# Patient Record
Sex: FEMALE | Employment: FULL TIME | URBAN - METROPOLITAN AREA
[De-identification: names, ages, dates, MRNs, and addresses within clinical notes are randomized per-mention and may not be internally consistent; named-entity substitution may affect disease eponyms.]

---

## 2019-06-12 ENCOUNTER — TELEPHONE (OUTPATIENT)
Dept: ENDOSCOPY | Facility: HOSPITAL | Age: 74
End: 2019-06-12

## 2019-06-12 DIAGNOSIS — R17 JAUNDICE: Primary | ICD-10-CM

## 2019-06-12 NOTE — TELEPHONE ENCOUNTER
Spoke with patient regarding EUS/ERCP for 6/13/19 at 1100. Medical history/medications and prep instructions reviewed. Patient verbalized an understanding.

## 2019-06-12 NOTE — TELEPHONE ENCOUNTER
Spoke with patient. EUS/ERCP scheduled for 6/13 at 11:00a. Reviewed prep instructions. Ms Hawkins verbalized understanding.

## 2019-06-12 NOTE — TELEPHONE ENCOUNTER
----- Message from Renita Carlson sent at 6/12/2019  9:51 AM CDT -----  Regarding: Ext Gastro Referral  Good morning,    Current pt is being referred to Dr Abdul from Dr Tam Mei for a pancreatic mass and jaundice. The referring clinic stated that Dr Mei has spoken to Dr Abdul about this pt already and he agreed to see pt this week. I have scanned the referral and records in to media mgr. Please contact pt to schedule and let me know if I can help any further. Also, the referring clinic's number is [phone] in case you needed to reach out.    Thank you,  Renita Carlson  [facility]   Ext [phone]

## 2019-06-13 ENCOUNTER — ANESTHESIA EVENT (OUTPATIENT)
Dept: ENDOSCOPY | Facility: HOSPITAL | Age: 74
End: 2019-06-13
Payer: COMMERCIAL

## 2019-06-13 ENCOUNTER — HOSPITAL ENCOUNTER (OUTPATIENT)
Facility: HOSPITAL | Age: 74
Discharge: HOME | End: 2019-06-13
Attending: INTERNAL MEDICINE | Admitting: INTERNAL MEDICINE
Payer: COMMERCIAL

## 2019-06-13 ENCOUNTER — ANESTHESIA (OUTPATIENT)
Dept: ENDOSCOPY | Facility: HOSPITAL | Age: 74
End: 2019-06-13
Payer: COMMERCIAL

## 2019-06-13 VITALS
OXYGEN SATURATION: 100 % | BODY MASS INDEX: 22.63 KG/M2 | DIASTOLIC BLOOD PRESSURE: 76 MMHG | WEIGHT: 123 LBS | SYSTOLIC BLOOD PRESSURE: 154 MMHG | HEART RATE: 76 BPM | TEMPERATURE: 98 F | HEIGHT: 62 IN | RESPIRATION RATE: 16 BRPM

## 2019-06-13 DIAGNOSIS — K83.1 OBSTRUCTIVE JAUNDICE: Primary | ICD-10-CM

## 2019-06-13 PROCEDURE — 27202059 HC NEEDLE, FNA (ANY): Performed by: INTERNAL MEDICINE

## 2019-06-13 PROCEDURE — 88172 CYTP DX EVAL FNA 1ST EA SITE: CPT | Mod: 26,,, | Performed by: PATHOLOGY

## 2019-06-13 PROCEDURE — C1769 GUIDE WIRE: HCPCS | Performed by: INTERNAL MEDICINE

## 2019-06-13 PROCEDURE — D9220A PRA ANESTHESIA: Mod: CRNA,,, | Performed by: NURSE ANESTHETIST, CERTIFIED REGISTERED

## 2019-06-13 PROCEDURE — D9220A PRA ANESTHESIA: ICD-10-PCS | Mod: CRNA,,, | Performed by: NURSE ANESTHETIST, CERTIFIED REGISTERED

## 2019-06-13 PROCEDURE — 63600175 PHARM REV CODE 636 W HCPCS: Performed by: NURSE ANESTHETIST, CERTIFIED REGISTERED

## 2019-06-13 PROCEDURE — 37000008 HC ANESTHESIA 1ST 15 MINUTES: Performed by: INTERNAL MEDICINE

## 2019-06-13 PROCEDURE — 25000003 PHARM REV CODE 250: Performed by: NURSE ANESTHETIST, CERTIFIED REGISTERED

## 2019-06-13 PROCEDURE — D9220A PRA ANESTHESIA: ICD-10-PCS | Mod: ANES,,, | Performed by: ANESTHESIOLOGY

## 2019-06-13 PROCEDURE — D9220A PRA ANESTHESIA: Mod: ANES,,, | Performed by: ANESTHESIOLOGY

## 2019-06-13 PROCEDURE — 88305 CYTOLOGY SPECIMEN- FNA RADIOLOGY GUIDED, BRONCH/EBUS, EUS/GI: ICD-10-PCS | Mod: 26,,, | Performed by: PATHOLOGY

## 2019-06-13 PROCEDURE — 94761 N-INVAS EAR/PLS OXIMETRY MLT: CPT

## 2019-06-13 PROCEDURE — 88305 TISSUE EXAM BY PATHOLOGIST: CPT | Mod: 26,,, | Performed by: PATHOLOGY

## 2019-06-13 PROCEDURE — 27202304 HC CANNULA, ERCP: Performed by: INTERNAL MEDICINE

## 2019-06-13 PROCEDURE — 37000009 HC ANESTHESIA EA ADD 15 MINS: Performed by: INTERNAL MEDICINE

## 2019-06-13 PROCEDURE — 88172 CYTOLOGY SPECIMEN- FNA RADIOLOGY GUIDED, BRONCH/EBUS, EUS/GI: ICD-10-PCS | Mod: 26,,, | Performed by: PATHOLOGY

## 2019-06-13 PROCEDURE — 88305 TISSUE EXAM BY PATHOLOGIST: CPT | Performed by: PATHOLOGY

## 2019-06-13 PROCEDURE — 27201674 HC SPHINCTERTOME: Performed by: INTERNAL MEDICINE

## 2019-06-13 PROCEDURE — 43242 PR UPGI ENDOSCOPY,FN NEEDLE BX,GUIDED: ICD-10-PCS | Mod: 22,,, | Performed by: INTERNAL MEDICINE

## 2019-06-13 PROCEDURE — 43242 EGD US FINE NEEDLE BX/ASPIR: CPT | Mod: 22,,, | Performed by: INTERNAL MEDICINE

## 2019-06-13 PROCEDURE — 43242 EGD US FINE NEEDLE BX/ASPIR: CPT | Performed by: INTERNAL MEDICINE

## 2019-06-13 PROCEDURE — 25000003 PHARM REV CODE 250: Performed by: INTERNAL MEDICINE

## 2019-06-13 PROCEDURE — 25500020 PHARM REV CODE 255: Performed by: INTERNAL MEDICINE

## 2019-06-13 PROCEDURE — 88173 CYTOLOGY SPECIMEN- FNA RADIOLOGY GUIDED, BRONCH/EBUS, EUS/GI: ICD-10-PCS | Mod: 26,,, | Performed by: PATHOLOGY

## 2019-06-13 PROCEDURE — 88173 CYTOPATH EVAL FNA REPORT: CPT | Mod: 26,,, | Performed by: PATHOLOGY

## 2019-06-13 RX ORDER — TIZANIDINE 4 MG/1
4 TABLET ORAL EVERY 6 HOURS PRN
Status: ON HOLD | COMMUNITY
End: 2020-03-18 | Stop reason: HOSPADM

## 2019-06-13 RX ORDER — SODIUM CHLORIDE 9 MG/ML
INJECTION, SOLUTION INTRAVENOUS CONTINUOUS
Status: DISCONTINUED | OUTPATIENT
Start: 2019-06-13 | End: 2019-06-13 | Stop reason: HOSPADM

## 2019-06-13 RX ORDER — LIDOCAINE HCL/PF 100 MG/5ML
SYRINGE (ML) INTRAVENOUS
Status: DISCONTINUED | OUTPATIENT
Start: 2019-06-13 | End: 2019-06-13

## 2019-06-13 RX ORDER — ERGOCALCIFEROL 1.25 MG/1
8000 CAPSULE ORAL
COMMUNITY

## 2019-06-13 RX ORDER — SERTRALINE HYDROCHLORIDE 25 MG/1
25 TABLET, FILM COATED ORAL DAILY
COMMUNITY

## 2019-06-13 RX ORDER — LEVOTHYROXINE SODIUM 50 UG/1
50 TABLET ORAL DAILY
COMMUNITY

## 2019-06-13 RX ORDER — CIPROFLOXACIN 500 MG/1
500 TABLET ORAL EVERY 12 HOURS
Qty: 10 TABLET | Refills: 0 | Status: SHIPPED | OUTPATIENT
Start: 2019-06-13 | End: 2019-06-18

## 2019-06-13 RX ORDER — PROPOFOL 10 MG/ML
VIAL (ML) INTRAVENOUS
Status: DISCONTINUED | OUTPATIENT
Start: 2019-06-13 | End: 2019-06-13

## 2019-06-13 RX ORDER — LORAZEPAM 2 MG/ML
0.25 INJECTION INTRAMUSCULAR ONCE AS NEEDED
Status: DISCONTINUED | OUTPATIENT
Start: 2019-06-13 | End: 2019-06-13 | Stop reason: HOSPADM

## 2019-06-13 RX ORDER — VITAMIN B COMPLEX
1 CAPSULE ORAL DAILY
COMMUNITY

## 2019-06-13 RX ORDER — MEPERIDINE HYDROCHLORIDE 25 MG/ML
12.5 INJECTION INTRAMUSCULAR; INTRAVENOUS; SUBCUTANEOUS ONCE AS NEEDED
Status: DISCONTINUED | OUTPATIENT
Start: 2019-06-13 | End: 2019-06-13 | Stop reason: HOSPADM

## 2019-06-13 RX ORDER — GABAPENTIN 300 MG/1
300 CAPSULE ORAL 3 TIMES DAILY
COMMUNITY

## 2019-06-13 RX ORDER — INDOMETHACIN 50 MG/1
SUPPOSITORY RECTAL
Status: COMPLETED | OUTPATIENT
Start: 2019-06-13 | End: 2019-06-13

## 2019-06-13 RX ORDER — TRAMADOL HYDROCHLORIDE 50 MG/1
50 TABLET ORAL EVERY 6 HOURS PRN
COMMUNITY

## 2019-06-13 RX ORDER — ONDANSETRON 2 MG/ML
INJECTION INTRAMUSCULAR; INTRAVENOUS
Status: DISCONTINUED | OUTPATIENT
Start: 2019-06-13 | End: 2019-06-13

## 2019-06-13 RX ORDER — HYDROCODONE BITARTRATE AND ACETAMINOPHEN 7.5; 325 MG/1; MG/1
1 TABLET ORAL EVERY 6 HOURS PRN
Status: ON HOLD | COMMUNITY
End: 2020-03-18 | Stop reason: HOSPADM

## 2019-06-13 RX ORDER — ASPIRIN 81 MG/1
81 TABLET ORAL DAILY
COMMUNITY

## 2019-06-13 RX ORDER — BUPROPION HYDROCHLORIDE 150 MG/1
150 TABLET ORAL DAILY
COMMUNITY

## 2019-06-13 RX ORDER — FENTANYL CITRATE 50 UG/ML
INJECTION, SOLUTION INTRAMUSCULAR; INTRAVENOUS
Status: DISCONTINUED | OUTPATIENT
Start: 2019-06-13 | End: 2019-06-13

## 2019-06-13 RX ORDER — HYDROMORPHONE HYDROCHLORIDE 1 MG/ML
0.2 INJECTION, SOLUTION INTRAMUSCULAR; INTRAVENOUS; SUBCUTANEOUS EVERY 5 MIN PRN
Status: DISCONTINUED | OUTPATIENT
Start: 2019-06-13 | End: 2019-06-13 | Stop reason: HOSPADM

## 2019-06-13 RX ORDER — PANTOPRAZOLE SODIUM 40 MG/1
40 TABLET, DELAYED RELEASE ORAL DAILY
COMMUNITY

## 2019-06-13 RX ORDER — SUCCINYLCHOLINE CHLORIDE 20 MG/ML
INJECTION INTRAMUSCULAR; INTRAVENOUS
Status: DISCONTINUED | OUTPATIENT
Start: 2019-06-13 | End: 2019-06-13

## 2019-06-13 RX ORDER — SODIUM CHLORIDE 9 MG/ML
INJECTION, SOLUTION INTRAVENOUS CONTINUOUS PRN
Status: DISCONTINUED | OUTPATIENT
Start: 2019-06-13 | End: 2019-06-13

## 2019-06-13 RX ADMIN — ONDANSETRON 4 MG: 2 INJECTION INTRAMUSCULAR; INTRAVENOUS at 01:06

## 2019-06-13 RX ADMIN — INDOMETHACIN 100 MG: 50 SUPPOSITORY RECTAL at 12:06

## 2019-06-13 RX ADMIN — LIDOCAINE HYDROCHLORIDE 100 MG: 20 INJECTION, SOLUTION INTRAVENOUS at 10:06

## 2019-06-13 RX ADMIN — PROPOFOL 30 MG: 10 INJECTION, EMULSION INTRAVENOUS at 01:06

## 2019-06-13 RX ADMIN — FENTANYL CITRATE 100 MCG: 50 INJECTION, SOLUTION INTRAMUSCULAR; INTRAVENOUS at 10:06

## 2019-06-13 RX ADMIN — SODIUM CHLORIDE: 0.9 INJECTION, SOLUTION INTRAVENOUS at 10:06

## 2019-06-13 RX ADMIN — IOHEXOL 6 ML: 300 INJECTION, SOLUTION INTRAVENOUS at 11:06

## 2019-06-13 RX ADMIN — SUCCINYLCHOLINE CHLORIDE 160 MG: 20 INJECTION, SOLUTION INTRAMUSCULAR; INTRAVENOUS at 10:06

## 2019-06-13 RX ADMIN — PROPOFOL 150 MG: 10 INJECTION, EMULSION INTRAVENOUS at 10:06

## 2019-06-13 NOTE — ANESTHESIA POSTPROCEDURE EVALUATION
Anesthesia Post Evaluation    Patient: Julee Hawkins    Procedure(s) Performed: Procedure(s) (LRB):  ULTRASOUND, UPPER GI TRACT, ENDOSCOPIC (N/A)  ERCP (ENDOSCOPIC RETROGRADE CHOLANGIOPANCREATOGRAPHY) (N/A)    Final Anesthesia Type: general  Patient location during evaluation: PACU  Patient participation: Yes- Able to Participate  Level of consciousness: awake and alert  Post-procedure vital signs: reviewed and stable  Pain management: adequate  Airway patency: patent  PONV status at discharge: No PONV  Anesthetic complications: no      Cardiovascular status: blood pressure returned to baseline  Respiratory status: spontaneous ventilation and room air  Hydration status: euvolemic  Follow-up not needed.          Vitals Value Taken Time   /68 6/13/2019  2:01 PM   Temp 36.7 °C (98.1 °F) 6/13/2019  1:42 PM   Pulse 75 6/13/2019  2:03 PM   Resp 23 6/13/2019  2:03 PM   SpO2 100 % 6/13/2019  2:03 PM   Vitals shown include unvalidated device data.      No case tracking events are documented in the log.      Pain/Ana Cristina Score: Ana Cristina Score: 9 (6/13/2019  2:00 PM)

## 2019-06-13 NOTE — ANESTHESIA PREPROCEDURE EVALUATION
06/13/2019  Julee Hawkins is a 73 y.o., female.    Anesthesia Evaluation    I have reviewed the Patient Summary Reports.    I have reviewed the Nursing Notes.      Review of Systems  Anesthesia Hx:  No problems with previous Anesthesia    Hematology/Oncology:  Hematology Normal   Oncology Normal     EENT/Dental:EENT/Dental Normal   Cardiovascular:  Cardiovascular Normal     Pulmonary:  Pulmonary Normal    Renal/:  Renal/ Normal     Hepatic/GI:  Hepatic/GI Normal    Musculoskeletal:   Arthritis     Neurological:  Neurology Normal    Endocrine:  Endocrine Normal    Dermatological:  Skin Normal    Psych:  Psychiatric Normal           Physical Exam  General:  Well nourished    Airway/Jaw/Neck:  Airway Findings: Mouth Opening: Normal Tongue: Normal  General Airway Assessment: Adult  Mallampati: II  TM Distance: Normal, at least 6 cm        Eyes/Ears/Nose:  EYES/EARS/NOSE FINDINGS: Normal   Dental:  Dental Findings: In tact   Chest/Lungs:  Chest/Lungs Clear    Heart/Vascular:  Heart Findings: Normal Heart murmur: negative Vascular Findings: Normal    Abdomen:  Abdomen Findings: Normal    Musculoskeletal:  Musculoskeletal Findings: Normal   Skin:  Skin Findings: Normal    Mental Status:  Mental Status Findings: Normal        Anesthesia Plan  Type of Anesthesia, risks & benefits discussed:  Anesthesia Type:  general  Patient's Preference:   Intra-op Monitoring Plan:   Intra-op Monitoring Plan Comments:   Post Op Pain Control Plan:   Post Op Pain Control Plan Comments:   Induction:   IV  Beta Blocker:  Patient is not currently on a Beta-Blocker (No further documentation required).       Informed Consent: Patient understands risks and agrees with Anesthesia plan.  Questions answered. Anesthesia consent signed with patient.  ASA Score: 2     Day of Surgery Review of History & Physical:    H&P update referred  to the surgeon.         Ready For Surgery From Anesthesia Perspective.

## 2019-06-13 NOTE — H&P
History & Physical - Short Stay  Gastroenterology      SUBJECTIVE:     Procedure: EUS and ERCP    Chief Complaint/Indication for Procedure: Pancreas lesion    History of Present Illness:  Patient is a 73 y.o. female presents with Obstructive jaundice and pancreas mass here for EUS and ERCP.    PTA Medications   Medication Sig    aspirin (ECOTRIN) 81 MG EC tablet Take 81 mg by mouth once daily.    b complex vitamins capsule Take 1 capsule by mouth once daily.    buPROPion (WELLBUTRIN XL) 150 MG TB24 tablet Take 150 mg by mouth once daily.    calcium carbonate 1250 MG capsule Take 1,250 mg by mouth 2 (two) times daily with meals.    ergocalciferol (VITAMIN D2) 50,000 unit Cap Take 50,000 Units by mouth every 7 days.    gabapentin (NEURONTIN) 300 MG capsule Take 300 mg by mouth 3 (three) times daily.    HYDROcodone-acetaminophen (NORCO) 7.5-325 mg per tablet Take 1 tablet by mouth every 6 (six) hours as needed for Pain.    levothyroxine (SYNTHROID) 50 MCG tablet Take 50 mcg by mouth once daily.    pantoprazole (PROTONIX) 40 MG tablet Take 40 mg by mouth once daily.    sertraline (ZOLOFT) 25 MG tablet Take 25 mg by mouth once daily.    tiZANidine (ZANAFLEX) 4 MG tablet Take 4 mg by mouth every 6 (six) hours as needed.    traMADol (ULTRAM) 50 mg tablet Take 50 mg by mouth every 6 (six) hours as needed for Pain.       Review of patient's allergies indicates:   Allergen Reactions    Codeine Hallucinations        Past Medical History:   Diagnosis Date    Arthritis     Cancer     Breast cancer    Thyroid disease      Past Surgical History:   Procedure Laterality Date    APPENDECTOMY      BACK SURGERY      BREAST SURGERY      lumpectomy    Gallbladder removed      HYSTERECTOMY       Family History   Problem Relation Age of Onset    Cancer Paternal Uncle      Social History     Tobacco Use    Smoking status: Former Smoker     Last attempt to quit: 1969     Years since quittin.0    Smokeless  tobacco: Never Used   Substance Use Topics    Alcohol use: Yes     Alcohol/week: 1.2 oz     Types: 2 Glasses of wine per week     Comment: 2 glasses of red wine/ night . Not currently     Drug use: Not on file       Review of Systems:  Constitutional: no fever or chills  Respiratory: no cough or shortness of breath  Cardiovascular: no chest pain or palpitations  Gastrointestinal: no nausea or vomiting, no abdominal pain or change in bowel habits    OBJECTIVE:     Vital Signs (Most Recent)  Temp: 97.4 °F (36.3 °C) (06/13/19 1012)  Pulse: 76 (06/13/19 1012)  Resp: 18 (06/13/19 1012)  BP: 117/71 (06/13/19 1012)  SpO2: 99 % (06/13/19 1012)    Physical Exam:  General: well developed, well nourished, icteric  Lungs:  normal respiratory effort  Heart: regular rate, S1, S2 normal  Abdomen: soft, non-tender non-distented; bowel sounds normal; no masses,  no organomegaly    Laboratory  CBC: No results for input(s): WBC, RBC, HGB, HCT, PLT, MCV, MCH, MCHC in the last 168 hours.  CMP: No results for input(s): GLU, CALCIUM, ALBUMIN, PROT, NA, K, CO2, CL, BUN, CREATININE, ALKPHOS, ALT, AST, BILITOT in the last 168 hours.  Coagulation: No results for input(s): LABPROT, INR, APTT in the last 168 hours.      Diagnostic Results:      ASSESSMENT/PLAN:     Pancreas lesion  Obstructive jaundice    Plan: EUS and ERCP    Anesthesia Plan: MAC/General    ASA Grade: ASA 3 - Patient with moderate systemic disease with functional limitations     The impression and plan was discussed in detail with the patient and family. All questions have been answered and the patient voices understanding of our plan at this point. The risk of the procedure was discussed in detail which includes but not limited to bleeding, infection, perforation in some cases requiring surgery with its spectrum of complications.

## 2019-06-13 NOTE — [employer] PATIENT INSTRUCTIONS
Discharge Summary/Instructions after an Endoscopic Procedure  Patient Name: Julee Hawkins  Patient MRN: [id number]  Patient YOB: 1945 Thursday, June 13, 2019  Jhonathan Abdul MD  RESTRICTIONS:  During your procedure today, you received medications for sedation.  These   medications may affect your judgment, balance and coordination.  Therefore,   for 24 hours, you have the following restrictions:   - DO NOT drive a car, operate machinery, make legal/financial decisions,   sign important papers or drink alcohol.    ACTIVITY:  Today: no heavy lifting, straining or running due to procedural   sedation/anesthesia.  The following day: return to full activity including work.  DIET:  Eat and drink normally unless instructed otherwise.     TREATMENT FOR COMMON SIDE EFFECTS:  - Mild abdominal pain, nausea, belching, bloating or excessive gas:  rest,   eat lightly and use a heating pad.  - Sore Throat: treat with throat lozenges and/or gargle with warm salt   water.  - Because air was used during the procedure, expelling large amounts of air   from your rectum or belching is normal.  - If a bowel prep was taken, you may not have a bowel movement for 1-3 days.    This is normal.  SYMPTOMS TO WATCH FOR AND REPORT TO YOUR PHYSICIAN:  1. Abdominal pain or bloating, other than gas cramps.  2. Chest pain.  3. Back pain.  4. Signs of infection such as: chills or fever occurring within 24 hours   after the procedure.  5. Rectal bleeding, which would show as bright red, maroon, or black stools.   (A tablespoon of blood from the rectum is not serious, especially if   hemorrhoids are present.)  6. Vomiting.  7. Weakness or dizziness.  GO DIRECTLY TO THE NEAREST EMERGENCY ROOM IF YOU HAVE ANY OF THE FOLLOWING:      Difficulty breathing              Chills and/or fever over 101 F   Persistent vomiting and/or vomiting blood   Severe abdominal pain   Severe chest pain   Black, tarry stools   Bleeding- more than one  tablespoon   Any other symptom or condition that you feel may need urgent attention  Your doctor recommends these additional instructions:  If any biopsies were taken, your doctors clinic will contact you in 1 to 2   weeks with any results.  - Avoid aspirin and nonsteroidal anti-inflammatory medicines.   - Discharge patient to home (ambulatory).   - Watch for pancreatitis, bleeding, perforation, and cholangitis.   - Cipro (ciprofloxacin) 500 mg PO BID for 5 days.   - Schedule for PTC.  - The findings and recommendations were discussed with the patient's   family.  For questions, problems or results please call your physician - Jhonathan Abdul MD at Work:  [phone].  [facility], EMERGENCY ROOM PHONE NUMBER: [phone]  IF A COMPLICATION OR EMERGENCY SITUATION ARISES AND YOU ARE UNABLE TO REACH   YOUR PHYSICIAN - GO DIRECTLY TO THE EMERGENCY ROOM.  Jhonathan Abdul MD  6/13/2019 3:16:36 PM  This report has been verified and signed electronically.  [employer]

## 2019-06-13 NOTE — [employer]ATION PATIENT INSTRUCTIONS
Discharge Summary/Instructions after an Endoscopic Procedure  Patient Name: Julee Hawkins  Patient MRN: [id number]  Patient YOB: 1945 Thursday, June 13, 2019  Jhonathan Abdul MD  RESTRICTIONS:  During your procedure today, you received medications for sedation.  These   medications may affect your judgment, balance and coordination.  Therefore,   for 24 hours, you have the following restrictions:   - DO NOT drive a car, operate machinery, make legal/financial decisions,   sign important papers or drink alcohol.    ACTIVITY:  Today: no heavy lifting, straining or running due to procedural   sedation/anesthesia.  The following day: return to full activity including work.  DIET:  Eat and drink normally unless instructed otherwise.     TREATMENT FOR COMMON SIDE EFFECTS:  - Mild abdominal pain, nausea, belching, bloating or excessive gas:  rest,   eat lightly and use a heating pad.  - Sore Throat: treat with throat lozenges and/or gargle with warm salt   water.  - Because air was used during the procedure, expelling large amounts of air   from your rectum or belching is normal.  - If a bowel prep was taken, you may not have a bowel movement for 1-3 days.    This is normal.  SYMPTOMS TO WATCH FOR AND REPORT TO YOUR PHYSICIAN:  1. Abdominal pain or bloating, other than gas cramps.  2. Chest pain.  3. Back pain.  4. Signs of infection such as: chills or fever occurring within 24 hours   after the procedure.  5. Rectal bleeding, which would show as bright red, maroon, or black stools.   (A tablespoon of blood from the rectum is not serious, especially if   hemorrhoids are present.)  6. Vomiting.  7. Weakness or dizziness.  GO DIRECTLY TO THE NEAREST EMERGENCY ROOM IF YOU HAVE ANY OF THE FOLLOWING:      Difficulty breathing              Chills and/or fever over 101 F   Persistent vomiting and/or vomiting blood   Severe abdominal pain   Severe chest pain   Black, tarry stools   Bleeding- more than one  tablespoon   Any other symptom or condition that you feel may need urgent attention  Your doctor recommends these additional instructions:  If any biopsies were taken, your doctors clinic will contact you in 1 to 2   weeks with any results.  - Discharge patient to home (ambulatory).   - Await cytology results.   - Perform an ERCP today.  For questions, problems or results please call your physician - Jhonathan Abdul MD at Work:  [phone].  [facility], EMERGENCY ROOM PHONE NUMBER: [phone]  IF A COMPLICATION OR EMERGENCY SITUATION ARISES AND YOU ARE UNABLE TO REACH   YOUR PHYSICIAN - GO DIRECTLY TO THE EMERGENCY ROOM.  Jhonathan Abdul MD  6/13/2019 3:11:06 PM  This report has been verified and signed electronically.  [employer]

## 2019-06-13 NOTE — TRANSFER OF CARE
"Anesthesia Transfer of Care Note    Patient: Julee Hawkins    Procedure(s) Performed: Procedure(s) (LRB):  ULTRASOUND, UPPER GI TRACT, ENDOSCOPIC (N/A)  ERCP (ENDOSCOPIC RETROGRADE CHOLANGIOPANCREATOGRAPHY) (N/A)    Patient location: PACU    Anesthesia Type: general    Transport from OR: Transported from OR on 6-10 L/min O2 by face mask with adequate spontaneous ventilation    Post pain: adequate analgesia    Post assessment: no apparent anesthetic complications and tolerated procedure well    Post vital signs: stable    Level of consciousness: awake    Nausea/Vomiting: no nausea/vomiting    Complications: none    Transfer of care protocol was followed      Last vitals:   Visit Vitals  /71   Pulse (P) 75   Temp 36.3 °C (97.4 °F)   Resp (P) 18   Ht 5' 2" (1.575 m)   Wt 55.8 kg (123 lb)   SpO2 (P) 100%   Breastfeeding? No   BMI 22.50 kg/m²     "

## 2019-06-13 NOTE — DISCHARGE SUMMARY
Discharge Summary/Instructions after an Endoscopic Procedure    Patient Name: Julee Hawkins  Patient MRN: [id number]  Patient YOB: 1945 Thursday, June 13, 2019  Jhonathan Abdul MD    RESTRICTIONS:  During your procedure today, you received medications for sedation.  These medications may affect your judgment, balance and coordination.  Therefore, for 24 hours, you have the following restrictions:     - DO NOT drive a car, operate machinery, make legal/financial decisions, sign important papers or drink alcohol.      ACTIVITY:  Today: no heavy lifting, straining or running due to procedural sedation/anesthesia.  The following day: return to full activity including work.    DIET:  Eat and drink normally unless instructed otherwise.     TREATMENT FOR COMMON SIDE EFFECTS:  - Mild abdominal pain, nausea, belching, bloating or excessive gas:  rest, eat lightly and use a heating pad.  - Sore Throat: treat with throat lozenges and/or gargle with warm salt water.  - Because air was used during the procedure, expelling large amounts of air from your rectum or belching is normal.  - If a bowel prep was taken, you may not have a bowel movement for 1-3 days.  This is normal.      SYMPTOMS TO WATCH FOR AND REPORT TO YOUR PHYSICIAN:  1. Abdominal pain or bloating, other than gas cramps.  2. Chest pain.  3. Back pain.  4. Signs of infection such as: chills or fever occurring within 24 hours after the procedure.  5. Rectal bleeding, which would show as bright red, maroon, or black stools. (A tablespoon of blood from the rectum is not serious, especially if hemorrhoids are present.)  6. Vomiting.  7. Weakness or dizziness.      GO DIRECTLY TO THE NEAREST EMERGENCY ROOM IF YOU HAVE ANY OF THE FOLLOWING:     Difficulty breathing              Chills and/or fever over 101 F   Persistent vomiting and/or vomiting blood   Severe abdominal pain   Severe chest pain   Black, tarry stools   Bleeding- more than one tablespoon   Any  other symptom or condition that you feel may need urgent attention    Your doctor recommends these additional instructions:  If any biopsies were taken, your doctors clinic will contact you in 1 to 2 weeks with any results.    - Avoid aspirin and nonsteroidal anti-inflammatory medicines.   - Discharge patient to home (ambulatory).   - Watch for pancreatitis, bleeding, perforation, and cholangitis.   - Cipro (ciprofloxacin) 500 mg PO BID for 5 days.   - Schedule for PTC.  - The findings and recommendations were discussed with the patient's family.    For questions, problems or results please call your physician - Jhonathan Abdul MD at Work:  [phone].    [facility], EMERGENCY ROOM PHONE NUMBER: [phone]    IF A COMPLICATION OR EMERGENCY SITUATION ARISES AND YOU ARE UNABLE TO REACH YOUR PHYSICIAN - GO DIRECTLY TO THE EMERGENCY ROOM.

## 2019-06-13 NOTE — DISCHARGE SUMMARY
Discharge Summary/Instructions after an Endoscopic Procedure    Patient Name: Julee Hawkins  Patient MRN: [id number]  Patient YOB: 1945 Thursday, June 13, 2019  Jhonathan Abdul MD    RESTRICTIONS:  During your procedure today, you received medications for sedation.  These medications may affect your judgment, balance and coordination.  Therefore, for 24 hours, you have the following restrictions:     - DO NOT drive a car, operate machinery, make legal/financial decisions, sign important papers or drink alcohol.      ACTIVITY:  Today: no heavy lifting, straining or running due to procedural sedation/anesthesia.  The following day: return to full activity including work.    DIET:  Eat and drink normally unless instructed otherwise.     TREATMENT FOR COMMON SIDE EFFECTS:  - Mild abdominal pain, nausea, belching, bloating or excessive gas:  rest, eat lightly and use a heating pad.  - Sore Throat: treat with throat lozenges and/or gargle with warm salt water.  - Because air was used during the procedure, expelling large amounts of air from your rectum or belching is normal.  - If a bowel prep was taken, you may not have a bowel movement for 1-3 days.  This is normal.      SYMPTOMS TO WATCH FOR AND REPORT TO YOUR PHYSICIAN:  1. Abdominal pain or bloating, other than gas cramps.  2. Chest pain.  3. Back pain.  4. Signs of infection such as: chills or fever occurring within 24 hours after the procedure.  5. Rectal bleeding, which would show as bright red, maroon, or black stools. (A tablespoon of blood from the rectum is not serious, especially if hemorrhoids are present.)  6. Vomiting.  7. Weakness or dizziness.      GO DIRECTLY TO THE NEAREST EMERGENCY ROOM IF YOU HAVE ANY OF THE FOLLOWING:     Difficulty breathing              Chills and/or fever over 101 F   Persistent vomiting and/or vomiting blood   Severe abdominal pain   Severe chest pain   Black, tarry stools   Bleeding- more than one tablespoon   Any  other symptom or condition that you feel may need urgent attention    Your doctor recommends these additional instructions:  If any biopsies were taken, your doctors clinic will contact you in 1 to 2 weeks with any results.    - Discharge patient to home (ambulatory).   - Await cytology results.   - Perform an ERCP today.    For questions, problems or results please call your physician - Jhonathan Abdul MD at Work:  [phone].    [facility], EMERGENCY ROOM PHONE NUMBER: [phone]    IF A COMPLICATION OR EMERGENCY SITUATION ARISES AND YOU ARE UNABLE TO REACH YOUR PHYSICIAN - GO DIRECTLY TO THE EMERGENCY ROOM.

## 2019-06-19 ENCOUNTER — TELEPHONE (OUTPATIENT)
Dept: ENDOSCOPY | Facility: HOSPITAL | Age: 74
End: 2019-06-19

## 2019-06-19 ENCOUNTER — TELEPHONE (OUTPATIENT)
Dept: GASTROENTEROLOGY | Facility: CLINIC | Age: 74
End: 2019-06-19

## 2019-06-19 DIAGNOSIS — K83.1 BILIARY STRICTURE: Primary | ICD-10-CM

## 2019-06-19 NOTE — TELEPHONE ENCOUNTER
Spoke with the patient. Patient need a PTC ASAP. I need to know by tomorrow. If no possible, then I will speak with Dr Mei to get this done in [place].

## 2019-06-20 ENCOUNTER — TELEPHONE (OUTPATIENT)
Dept: ENDOSCOPY | Facility: HOSPITAL | Age: 74
End: 2019-06-20

## 2019-06-20 NOTE — TELEPHONE ENCOUNTER
----- Message from Jhonathan Abudl MD sent at 6/20/2019  9:46 AM CDT -----  Patient informed about the FNA results. PTC to be performed in [place]. Need appointment with Surgical Oncology.

## 2019-06-21 ENCOUNTER — TELEPHONE (OUTPATIENT)
Dept: SURGERY | Facility: CLINIC | Age: 74
End: 2019-06-21

## 2019-06-21 DIAGNOSIS — C25.9 MALIGNANT NEOPLASM OF PANCREAS, UNSPECIFIED LOCATION OF MALIGNANCY: Primary | ICD-10-CM

## 2019-06-21 NOTE — TELEPHONE ENCOUNTER
Patient scheduled to have an IR drain placed in [place].  Did not want to schedule at this time.  Feeling overwhelmed at present being newly diagnosed.Wants to do everything in [place].  Provided my contact number.  She will call me after her drains are placed.

## 2019-06-21 NOTE — TELEPHONE ENCOUNTER
----- Message from Riri Cornell sent at 6/21/2019  9:44 AM CDT -----  Contact: pt  Patient Returning Call from [facility]    Who Left Message for Patient: Uzma    Communication Preference: [phone]     Additional Information:

## 2019-07-01 ENCOUNTER — TELEPHONE (OUTPATIENT)
Dept: SURGERY | Facility: CLINIC | Age: 74
End: 2019-07-01

## 2019-07-01 NOTE — TELEPHONE ENCOUNTER
----- Message from Lori Chowdhury sent at 7/1/2019 10:42 AM CDT -----  Edson Gusman calling to speak with you again.    [phone]

## 2019-07-09 ENCOUNTER — TELEPHONE (OUTPATIENT)
Dept: ENDOSCOPY | Facility: HOSPITAL | Age: 74
End: 2019-07-09

## 2019-07-09 NOTE — TELEPHONE ENCOUNTER
----- Message from Katherine Dooley sent at 7/9/2019  2:53 PM CDT -----  Contact: pt#[phone]  Needs Advice    Reason for call:She wants to speak with you about Bx        Communication Preference:call    Additional Information:

## 2019-07-12 ENCOUNTER — TELEPHONE (OUTPATIENT)
Dept: SURGERY | Facility: CLINIC | Age: 74
End: 2019-07-12

## 2019-07-17 NOTE — PROGRESS NOTES
"19  Encounter Date:  2019    Patient ID: Julee Hawkins  Age:  73 y.o. :  1945     Chief Complaint   Patient presents with    Consult     History:    Ms. Hawkins is a 73 y.o. 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  She arrives from [place], [place] accompanied by her , in a wheelchair.  She brought disc from local hospital with CT scan images.  Currently working full time  at [employer].     Referred by: Dr. Abdul    Given her hx of BRCA, s/p lumpectomy, she went for her annual checkup with Dr. Billy in 3/2019, who found low H/H. She was started on IV iron infusions.   She reports "feeling bad since 3/2019" and noticed darkening urine. She presented to local PCP with painless jaundice plus >10# weight loss who referred her to GI, Dr. Mei.   CT scan revealed biliary duct dilatation and head of pancreas cyst. Underwent local ERCP per Dr. Munoz but unable to place stent. She was then referred to [facility], but again unable to place stent. Dr. Munoz in [place] placed "internal/ external liver drain" which she still has in place, draining to external bag. She reports feeling better with drain in place, less yellow and urine lighter in color.     Data:     Radiology: Dr. Wall and I personally reviewed these images:  19: CT C/A/P:      19: ERCP       19: EUS per Franko  - Pancreas divisum was suspected.                        - There was no evidence of significant pathology in the visualized portion of the liver.                        - Two cystic lesions were seen in the pancreatic body. Tissue has not been obtained. However, the endosonographic appearance is highly suspicious for a branched intraductal papillary mucinous neoplasm.                        - There was dilation in the common bile duct which measured up to 14.6 mm.                        - A mass was identified in the pancreatic head. This " was staged T2 N0 M0 by endosonographic criteria.     6/13/19: Pathology:   Pancreas mass (needle biopsy with pathologist adequacy):  Positive for malignant cells  Adenocarcinoma    Labs:  Reviewed in media  Creatinine 0.9  INR 0.9  t bili 10.8      Alk Phos 1082    Past Medical History:   Diagnosis Date    Arthritis     Cancer     Breast cancer    Thyroid disease      Past Surgical History:   Procedure Laterality Date    APPENDECTOMY      BACK SURGERY      BREAST SURGERY      lumpectomy    ERCP (ENDOSCOPIC RETROGRADE CHOLANGIOPANCREATOGRAPHY) N/A 6/13/2019    Performed by Jhonathan Abdul MD at [facility] (2ND FLR)    Gallbladder removed      HYSTERECTOMY      ULTRASOUND, UPPER GI TRACT, ENDOSCOPIC N/A 6/13/2019    Performed by Jhonathan Abdul MD at [facility] (2ND FLR)     Current Outpatient Medications on File Prior to Visit   Medication Sig Dispense Refill    aspirin (ECOTRIN) 81 MG EC tablet Take 81 mg by mouth once daily.      b complex vitamins capsule Take 1 capsule by mouth once daily.      buPROPion (WELLBUTRIN XL) 150 MG TB24 tablet Take 150 mg by mouth once daily.      calcium carbonate 1250 MG capsule Take 1,250 mg by mouth 2 (two) times daily with meals.      ergocalciferol (VITAMIN D2) 50,000 unit Cap Take 50,000 Units by mouth every 7 days.      gabapentin (NEURONTIN) 300 MG capsule Take 300 mg by mouth 3 (three) times daily.      HYDROcodone-acetaminophen (NORCO) 7.5-325 mg per tablet Take 1 tablet by mouth every 6 (six) hours as needed for Pain.      levothyroxine (SYNTHROID) 50 MCG tablet Take 50 mcg by mouth once daily.      pantoprazole (PROTONIX) 40 MG tablet Take 40 mg by mouth once daily.      sertraline (ZOLOFT) 25 MG tablet Take 25 mg by mouth once daily.      tiZANidine (ZANAFLEX) 4 MG tablet Take 4 mg by mouth every 6 (six) hours as needed.      traMADol (ULTRAM) 50 mg tablet Take 50 mg by mouth every 6 (six) hours as needed for Pain.       No current  "facility-administered medications on file prior to visit.      Review of patient's allergies indicates:   Allergen Reactions    Codeine Hallucinations     Family History:  Her family history includes Cancer in her paternal uncle.     Social History:   reports that she quit smoking about 50 years ago. She has never used smokeless tobacco. She reports that she drinks about 1.2 oz of alcohol per week.     ROS:     Review of Systems   Constitutional: Positive for appetite change and fatigue. Negative for activity change and fever.   HENT: Negative for trouble swallowing.    Eyes: Negative for visual disturbance.   Respiratory: Negative for cough, choking and shortness of breath.    Cardiovascular: Negative for chest pain and leg swelling.   Gastrointestinal: Positive for abdominal pain. Negative for nausea and vomiting.   Genitourinary: Negative for difficulty urinating.   Musculoskeletal: Positive for arthralgias and neck pain (chronic since neck surgery). Negative for back pain and gait problem.   Skin: Positive for color change.   Neurological: Negative for headaches.   Psychiatric/Behavioral: Positive for sleep disturbance.     Pertinent positive/negatives detailed in HPI, all other systems negative.     Physical Exam:  BP 97/68   Pulse 67   Ht 5' 2.5" (1.588 m)   Wt 52.8 kg (116 lb 6.5 oz)   BMI 20.95 kg/m²     Constitutional:  Frail, thin female.  Performance status: ECOG 2  Eyes:  Sclerae icteric, gaze symmetrical  Neck:  Trachea midline, FROM  Resp:  Even and unlabored resp, CTA anterior bilaterally  CV:  Regular pulse, S1, S2, no murmurs, no rubs, no edema  Abd:  Soft, +tender, no masses, no hepatosplenomegaly, no ascites, no superficial varices; LLQ drain in place  Lymphatics:  No cervical, supraclavicular lymphadenopathy  Musculoskeletal:  + muscle wasting  Neuro:  No gross deficits  Psych:  Awake, alert, oriented.  Answers and asks questions appropriately      ICD-10-CM ICD-9-CM    1. Malignant neoplasm " "of head of pancreas C25.0 157.0      Plan:  Discussed pancreatic CA, staging, tx options including chemotherapy and surgical resection. Based on current imaging, tumor appears to be resectable. Discussed pros and cons of neoadj chemotherapy prior to surgery.   Obtain PTC drain procedure report. If it is internal / external drain, refer back to [facility] for metal stent placement.   Refer back to Dr. Billy for neoadj chemotherapy when bilirubin is acceptable.   No labs today    Pt seen in conjunction with Dr. Wall today.          Keri Hall NP  Surgical Oncology  [facility] [place], [place]  Office: [phone]  Fax: [phone]           ADDENDUM: 7/19/19  Dr. Mei office [phone] to request drain procedure note.  Received PTC placement procedure note per Dr. Munoz dated 6/24/19  "internal/ external biliary drain catheter placement without immediate complication"    "

## 2019-07-18 ENCOUNTER — INITIAL CONSULT (OUTPATIENT)
Dept: SURGERY | Facility: CLINIC | Age: 74
End: 2019-07-18
Payer: COMMERCIAL

## 2019-07-18 VITALS
WEIGHT: 116.38 LBS | BODY MASS INDEX: 20.62 KG/M2 | HEART RATE: 67 BPM | HEIGHT: 63 IN | DIASTOLIC BLOOD PRESSURE: 68 MMHG | SYSTOLIC BLOOD PRESSURE: 97 MMHG

## 2019-07-18 DIAGNOSIS — C25.0 MALIGNANT NEOPLASM OF HEAD OF PANCREAS: ICD-10-CM

## 2019-07-18 PROCEDURE — 99999 PR PBB SHADOW E&M-EST. PATIENT-LVL IV: CPT | Mod: PBBFAC,,, | Performed by: NURSE PRACTITIONER

## 2019-07-18 PROCEDURE — 99999 PR PBB SHADOW E&M-EST. PATIENT-LVL IV: ICD-10-PCS | Mod: PBBFAC,,, | Performed by: NURSE PRACTITIONER

## 2019-07-18 PROCEDURE — 99205 PR OFFICE/OUTPT VISIT, NEW, LEVL V, 60-74 MIN: ICD-10-PCS | Mod: S$GLB,,, | Performed by: NURSE PRACTITIONER

## 2019-07-18 PROCEDURE — 99205 OFFICE O/P NEW HI 60 MIN: CPT | Mod: S$GLB,,, | Performed by: NURSE PRACTITIONER

## 2019-07-18 RX ORDER — OXYCODONE AND ACETAMINOPHEN 10; 325 MG/1; MG/1
1 TABLET ORAL EVERY 6 HOURS PRN
Refills: 0 | Status: ON HOLD | COMMUNITY
Start: 2019-07-11 | End: 2020-03-18 | Stop reason: HOSPADM

## 2019-07-18 RX ORDER — CELECOXIB 200 MG/1
CAPSULE ORAL
Refills: 1 | COMMUNITY
Start: 2019-06-12

## 2019-07-18 RX ORDER — CYCLOSPORINE 0.5 MG/ML
1 EMULSION OPHTHALMIC 2 TIMES DAILY
COMMUNITY

## 2019-07-18 RX ORDER — ONDANSETRON 4 MG/1
TABLET, ORALLY DISINTEGRATING ORAL
Refills: 0 | COMMUNITY
Start: 2019-07-03

## 2019-07-18 NOTE — Clinical Note
Dr. Abdul performed EUS on 6/13/19, unable to place biliary stent. She had internal / external biliary drain placed locally in [place] on 6/24/19. Dr. Wall would like a metal stent placed now before neoadj chemotherapy for head of panc NATALY. Thank you.

## 2019-07-18 NOTE — LETTER
July 19, 2019      Jhonathan Abdul MD  [address]  [facility] [address]           [facility]  [address]  [facility] [address]  Phone: [phone]          Patient: Julee Hawkins   MR Number: [id number]   YOB: 1945   Date of Visit: 7/18/2019       Dear Dr. Jhonathan Abdul:    Thank you for referring Julee Hawkins to me for evaluation. Attached you will find relevant portions of my assessment and plan of care.    If you have questions, please do not hesitate to call me. I look forward to following Julee Hawkins along with you.    Sincerely,    OMAYRA Faye,ANP-C    Enclosure  CC:  No Recipients    If you would like to receive this communication electronically, please contact [email or web address] or [phone] to request more information on [employer] access.    For providers and/or their staff who would like to refer a patient to [facility], please contact us through our one-stop-shop provider referral line, [facility], at [phone].    If you feel you have received this communication in error or would no longer like to receive these types of communications, please e-mail [email or web address]

## 2019-07-22 ENCOUNTER — TUMOR BOARD CONFERENCE (OUTPATIENT)
Dept: SURGERY | Facility: CLINIC | Age: 74
End: 2019-07-22

## 2019-07-22 DIAGNOSIS — C25.0 MALIGNANT NEOPLASM OF HEAD OF PANCREAS: Primary | ICD-10-CM

## 2019-07-24 ENCOUNTER — PATIENT MESSAGE (OUTPATIENT)
Dept: SURGERY | Facility: CLINIC | Age: 74
End: 2019-07-24

## 2019-07-24 ENCOUNTER — TELEPHONE (OUTPATIENT)
Dept: ENDOSCOPY | Facility: HOSPITAL | Age: 74
End: 2019-07-24

## 2019-07-24 NOTE — TELEPHONE ENCOUNTER
----- Message from Uzma Watson RN sent at 7/24/2019  2:05 PM CDT -----  Regarding: FW: needs [facility], metal stent placement      ----- Message -----  From: OMAYRA Faye,ANP-C  Sent: 7/24/2019   8:53 AM  To: Uzma Watson RN  Subject: needs [facility], metal stent placement                 Please f/u with Paula for her appt for metal stent placement.   Saw Franko gutierrez    ----- Message -----  From: OMAYRA Faye,ANP-C  Sent: 7/23/2019  To: OMAYRA Faye,ANP-C  Subject: needs [facility], metal stent placement                 panc head NATALY  ERCP failed attempt at stent, PTC drain 6/24/19 in place    Refer back to Dr. Billy - needs appt for neoadj

## 2019-07-24 NOTE — TELEPHONE ENCOUNTER
Called patient to let her know that her chart will be reviewed by possibly Dr. Abdul and after that someone will be in touch with her.  Verbalized understanding  Encouraged to call with any concerns or questions.

## 2019-07-25 ENCOUNTER — PATIENT MESSAGE (OUTPATIENT)
Dept: GASTROENTEROLOGY | Facility: CLINIC | Age: 74
End: 2019-07-25

## 2019-07-26 NOTE — PROGRESS NOTES
[facility] UGI MULTIDISCIPLINARY TUMOR BOARD  PATIENT REVIEW FORM   ____________________________________________________________    CLINIC #: [id number]  DATE: 7/22/2019    DIAGNOSIS: head of pancreas NATALY    PRESENTER: [facility]    PATIENT SUMMARY:   73 y.o. female has hx of BRCA, s/p lumpectomy. She presented with painless jaundice, wt loss in March 2019.  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 In June, + pathology for adenocarcinoma. Unable to place stent via ERCP, so internal external drain placed locally in [place].     BOARD RECOMMENDATIONS:   Need in internalize stent then recommend proceed with neoadj chemotherapy    CONSULT NEEDED:     [] Surgery    [x] Hem/Onc    [] Rad/Onc    [] Dietary                 [] Social Service    [] Psychology       [] [facility]  [] Radiology     Clinical Stage: Tumor 2 Node(s) 0   Metastasis 0    Stage IB     Metastatic site(s): none         [x] Kate'l Treatment Guidelines reviewed and care planned is consistent with guidelines.         (i.e., NCCN, NCI, PD, ACO, AUA, etc.)    PRESENTATION AT CANCER CONFERENCE:         [x] Prospective    [] Retrospective     [] Follow-Up            [x] Eligible for clinical trial

## 2019-07-30 ENCOUNTER — TELEPHONE (OUTPATIENT)
Dept: SURGERY | Facility: CLINIC | Age: 74
End: 2019-07-30

## 2019-07-30 ENCOUNTER — PATIENT MESSAGE (OUTPATIENT)
Dept: SURGERY | Facility: CLINIC | Age: 74
End: 2019-07-30

## 2019-07-30 NOTE — TELEPHONE ENCOUNTER
Left message to clarify if she needs to be seen at [facility] or locally.   She may have internal/ external drain replaced with internal covered metal stent locally in [place], [place] on 8/6/19. We will need the procedure note to verify before she can begin chemotherapy.     Discussed case with Dr. Wall who agrees with above plan.

## 2019-07-31 ENCOUNTER — TELEPHONE (OUTPATIENT)
Dept: ENDOSCOPY | Facility: HOSPITAL | Age: 74
End: 2019-07-31

## 2019-07-31 NOTE — TELEPHONE ENCOUNTER
----- Message from Michelle Siddiqui MA sent at 7/31/2019  1:03 PM CDT -----  Contact: [phone]  Patient Returning Call from [facility]    Who Left Message for Patient: Marsha    Communication Preference: [phone]    Additional Information: pt does not know if this was an older message or if you tried calling her yesterday or today

## 2019-08-29 ENCOUNTER — PATIENT MESSAGE (OUTPATIENT)
Dept: SURGERY | Facility: CLINIC | Age: 74
End: 2019-08-29

## 2020-01-02 DIAGNOSIS — C25.0 MALIGNANT NEOPLASM OF HEAD OF PANCREAS: Primary | ICD-10-CM

## 2020-01-03 DIAGNOSIS — N13.5 URETERAL STRICTURE: ICD-10-CM

## 2020-01-03 DIAGNOSIS — C25.0 MALIGNANT NEOPLASM OF HEAD OF PANCREAS: Primary | ICD-10-CM

## 2020-01-09 ENCOUNTER — TELEPHONE (OUTPATIENT)
Dept: SURGERY | Facility: CLINIC | Age: 75
End: 2020-01-09

## 2020-01-23 ENCOUNTER — TELEPHONE (OUTPATIENT)
Dept: SURGERY | Facility: CLINIC | Age: 75
End: 2020-01-23

## 2020-01-23 NOTE — TELEPHONE ENCOUNTER
----- Message from Angel Wynne sent at 1/23/2020  1:27 PM CST -----  Contact: Pt      The Pt states that she would like to speak to Uzma.  The Pt states that she is trying to clarify how early she is supposed to arrive for her scan.  She has a letter that says two hours but she said that the other appt was one hour before.    Phone # [phone]

## 2020-01-27 ENCOUNTER — HOSPITAL ENCOUNTER (OUTPATIENT)
Dept: RADIOLOGY | Facility: HOSPITAL | Age: 75
Discharge: HOME | End: 2020-01-27
Attending: SURGERY
Payer: COMMERCIAL

## 2020-01-27 DIAGNOSIS — N13.5 URETERAL STRICTURE: ICD-10-CM

## 2020-01-27 DIAGNOSIS — C25.0 MALIGNANT NEOPLASM OF HEAD OF PANCREAS: ICD-10-CM

## 2020-01-27 PROCEDURE — 74177 CT ABD & PELVIS W/CONTRAST: CPT | Mod: TC

## 2020-01-27 PROCEDURE — 74177 CT ABDOMEN PELVIS WITH CONTRAST: ICD-10-PCS | Mod: 26,,, | Performed by: RADIOLOGY

## 2020-01-27 PROCEDURE — 74177 CT ABD & PELVIS W/CONTRAST: CPT | Mod: 26,,, | Performed by: RADIOLOGY

## 2020-01-27 PROCEDURE — 25500020 PHARM REV CODE 255: Performed by: SURGERY

## 2020-01-27 RX ADMIN — IOHEXOL 75 ML: 350 INJECTION, SOLUTION INTRAVENOUS at 04:01

## 2020-01-28 ENCOUNTER — OFFICE VISIT (OUTPATIENT)
Dept: SURGERY | Facility: CLINIC | Age: 75
End: 2020-01-28
Payer: COMMERCIAL

## 2020-01-28 ENCOUNTER — LAB VISIT (OUTPATIENT)
Dept: LAB | Facility: HOSPITAL | Age: 75
End: 2020-01-28
Payer: COMMERCIAL

## 2020-01-28 ENCOUNTER — OFFICE VISIT (OUTPATIENT)
Dept: UROLOGY | Facility: CLINIC | Age: 75
End: 2020-01-28
Payer: COMMERCIAL

## 2020-01-28 VITALS
BODY MASS INDEX: 19.32 KG/M2 | HEART RATE: 96 BPM | DIASTOLIC BLOOD PRESSURE: 74 MMHG | WEIGHT: 106.94 LBS | BODY MASS INDEX: 19.68 KG/M2 | DIASTOLIC BLOOD PRESSURE: 74 MMHG | HEIGHT: 62 IN | TEMPERATURE: 98 F | WEIGHT: 105 LBS | SYSTOLIC BLOOD PRESSURE: 106 MMHG | SYSTOLIC BLOOD PRESSURE: 106 MMHG | HEART RATE: 96 BPM | HEIGHT: 62 IN

## 2020-01-28 DIAGNOSIS — E55.9 VITAMIN D DEFICIENCY: ICD-10-CM

## 2020-01-28 DIAGNOSIS — N13.30 HYDRONEPHROSIS, UNSPECIFIED HYDRONEPHROSIS TYPE: Primary | ICD-10-CM

## 2020-01-28 DIAGNOSIS — R63.4 UNINTENTIONAL WEIGHT LOSS: ICD-10-CM

## 2020-01-28 DIAGNOSIS — C25.0 MALIGNANT NEOPLASM OF HEAD OF PANCREAS: ICD-10-CM

## 2020-01-28 DIAGNOSIS — E43 SEVERE PROTEIN-CALORIE MALNUTRITION: ICD-10-CM

## 2020-01-28 DIAGNOSIS — C25.0 MALIGNANT NEOPLASM OF HEAD OF PANCREAS: Primary | ICD-10-CM

## 2020-01-28 LAB
25(OH)D3+25(OH)D2 SERPL-MCNC: 101 NG/ML (ref 30–96)
ALBUMIN SERPL BCP-MCNC: 3.3 G/DL (ref 3.5–5.2)
ALP SERPL-CCNC: 1112 U/L (ref 55–135)
ALT SERPL W/O P-5'-P-CCNC: 47 U/L (ref 10–44)
ANION GAP SERPL CALC-SCNC: 13 MMOL/L (ref 8–16)
AST SERPL-CCNC: 52 U/L (ref 10–40)
BASOPHILS # BLD AUTO: 0.05 K/UL (ref 0–0.2)
BASOPHILS NFR BLD: 0.7 % (ref 0–1.9)
BILIRUB SERPL-MCNC: 0.6 MG/DL (ref 0.1–1)
BILIRUB UR QL STRIP: NEGATIVE
BUN SERPL-MCNC: 22 MG/DL (ref 8–23)
CALCIUM SERPL-MCNC: 10.2 MG/DL (ref 8.7–10.5)
CANCER AG19-9 SERPL-ACNC: 819 U/ML (ref 2–40)
CHLORIDE SERPL-SCNC: 100 MMOL/L (ref 95–110)
CLARITY UR REFRACT.AUTO: CLEAR
CO2 SERPL-SCNC: 22 MMOL/L (ref 23–29)
COLOR UR AUTO: YELLOW
CREAT SERPL-MCNC: 1.2 MG/DL (ref 0.5–1.4)
DIFFERENTIAL METHOD: ABNORMAL
EOSINOPHIL # BLD AUTO: 0.1 K/UL (ref 0–0.5)
EOSINOPHIL NFR BLD: 1.3 % (ref 0–8)
ERYTHROCYTE [DISTWIDTH] IN BLOOD BY AUTOMATED COUNT: 17.3 % (ref 11.5–14.5)
EST. GFR  (AFRICAN AMERICAN): 51.1 ML/MIN/1.73 M^2
EST. GFR  (NON AFRICAN AMERICAN): 44.3 ML/MIN/1.73 M^2
ESTIMATED AVG GLUCOSE: 94 MG/DL (ref 68–131)
GLUCOSE SERPL-MCNC: 112 MG/DL (ref 70–110)
GLUCOSE UR QL STRIP: NEGATIVE
HBA1C MFR BLD HPLC: 4.9 % (ref 4–5.6)
HCT VFR BLD AUTO: 32.1 % (ref 37–48.5)
HGB BLD-MCNC: 9.6 G/DL (ref 12–16)
HGB UR QL STRIP: NEGATIVE
IMM GRANULOCYTES # BLD AUTO: 0.05 K/UL (ref 0–0.04)
IMM GRANULOCYTES NFR BLD AUTO: 0.7 % (ref 0–0.5)
KETONES UR QL STRIP: NEGATIVE
LEUKOCYTE ESTERASE UR QL STRIP: NEGATIVE
LYMPHOCYTES # BLD AUTO: 0.8 K/UL (ref 1–4.8)
LYMPHOCYTES NFR BLD: 10.3 % (ref 18–48)
MCH RBC QN AUTO: 31.2 PG (ref 27–31)
MCHC RBC AUTO-ENTMCNC: 29.9 G/DL (ref 32–36)
MCV RBC AUTO: 104 FL (ref 82–98)
MONOCYTES # BLD AUTO: 0.5 K/UL (ref 0.3–1)
MONOCYTES NFR BLD: 7.1 % (ref 4–15)
NEUTROPHILS # BLD AUTO: 6.1 K/UL (ref 1.8–7.7)
NEUTROPHILS NFR BLD: 79.9 % (ref 38–73)
NITRITE UR QL STRIP: NEGATIVE
NRBC BLD-RTO: 0 /100 WBC
PH UR STRIP: 5 [PH] (ref 5–8)
PLATELET # BLD AUTO: 429 K/UL (ref 150–350)
PMV BLD AUTO: 9.1 FL (ref 9.2–12.9)
POTASSIUM SERPL-SCNC: 4 MMOL/L (ref 3.5–5.1)
PREALB SERPL-MCNC: 22 MG/DL (ref 20–43)
PROT SERPL-MCNC: 8.2 G/DL (ref 6–8.4)
PROT UR QL STRIP: NEGATIVE
RBC # BLD AUTO: 3.08 M/UL (ref 4–5.4)
SODIUM SERPL-SCNC: 135 MMOL/L (ref 136–145)
SP GR UR STRIP: 1.02 (ref 1–1.03)
URN SPEC COLLECT METH UR: NORMAL
WBC # BLD AUTO: 7.6 K/UL (ref 3.9–12.7)

## 2020-01-28 PROCEDURE — 85025 COMPLETE CBC W/AUTO DIFF WBC: CPT

## 2020-01-28 PROCEDURE — 3288F FALL RISK ASSESSMENT DOCD: CPT | Mod: CPTII,S$GLB,, | Performed by: UROLOGY

## 2020-01-28 PROCEDURE — 99999 PR PBB SHADOW E&M-EST. PATIENT-LVL IV: ICD-10-PCS | Mod: PBBFAC,,, | Performed by: NURSE PRACTITIONER

## 2020-01-28 PROCEDURE — 82306 VITAMIN D 25 HYDROXY: CPT

## 2020-01-28 PROCEDURE — 1100F PR PT FALLS ASSESS DOC 2+ FALLS/FALL W/INJURY/YR: ICD-10-PCS | Mod: CPTII,S$GLB,, | Performed by: UROLOGY

## 2020-01-28 PROCEDURE — 81003 URINALYSIS AUTO W/O SCOPE: CPT

## 2020-01-28 PROCEDURE — 87086 URINE CULTURE/COLONY COUNT: CPT

## 2020-01-28 PROCEDURE — 1159F PR MEDICATION LIST DOCUMENTED IN MEDICAL RECORD: ICD-10-PCS | Mod: S$GLB,,, | Performed by: UROLOGY

## 2020-01-28 PROCEDURE — 86301 IMMUNOASSAY TUMOR CA 19-9: CPT

## 2020-01-28 PROCEDURE — 83036 HEMOGLOBIN GLYCOSYLATED A1C: CPT

## 2020-01-28 PROCEDURE — 99999 PR PBB SHADOW E&M-EST. PATIENT-LVL IV: CPT | Mod: PBBFAC,,, | Performed by: NURSE PRACTITIONER

## 2020-01-28 PROCEDURE — 84134 ASSAY OF PREALBUMIN: CPT

## 2020-01-28 PROCEDURE — 3288F PR FALLS RISK ASSESSMENT DOCUMENTED: ICD-10-PCS | Mod: CPTII,S$GLB,, | Performed by: UROLOGY

## 2020-01-28 PROCEDURE — 1126F PR PAIN SEVERITY QUANTIFIED, NO PAIN PRESENT: ICD-10-PCS | Mod: S$GLB,,, | Performed by: UROLOGY

## 2020-01-28 PROCEDURE — 99214 OFFICE O/P EST MOD 30 MIN: CPT | Mod: S$GLB,,, | Performed by: NURSE PRACTITIONER

## 2020-01-28 PROCEDURE — 99999 PR PBB SHADOW E&M-EST. PATIENT-LVL III: CPT | Mod: PBBFAC,,, | Performed by: UROLOGY

## 2020-01-28 PROCEDURE — 99203 PR OFFICE/OUTPT VISIT, NEW, LEVL III, 30-44 MIN: ICD-10-PCS | Mod: S$GLB,,, | Performed by: UROLOGY

## 2020-01-28 PROCEDURE — 1159F MED LIST DOCD IN RCRD: CPT | Mod: S$GLB,,, | Performed by: UROLOGY

## 2020-01-28 PROCEDURE — 99203 OFFICE O/P NEW LOW 30 MIN: CPT | Mod: S$GLB,,, | Performed by: UROLOGY

## 2020-01-28 PROCEDURE — 1126F AMNT PAIN NOTED NONE PRSNT: CPT | Mod: S$GLB,,, | Performed by: UROLOGY

## 2020-01-28 PROCEDURE — 99999 PR PBB SHADOW E&M-EST. PATIENT-LVL III: ICD-10-PCS | Mod: PBBFAC,,, | Performed by: UROLOGY

## 2020-01-28 PROCEDURE — 80053 COMPREHEN METABOLIC PANEL: CPT

## 2020-01-28 PROCEDURE — 36415 COLL VENOUS BLD VENIPUNCTURE: CPT

## 2020-01-28 PROCEDURE — 1100F PTFALLS ASSESS-DOCD GE2>/YR: CPT | Mod: CPTII,S$GLB,, | Performed by: UROLOGY

## 2020-01-28 PROCEDURE — 99214 PR OFFICE/OUTPT VISIT, EST, LEVL IV, 30-39 MIN: ICD-10-PCS | Mod: S$GLB,,, | Performed by: NURSE PRACTITIONER

## 2020-01-28 RX ORDER — ONDANSETRON 8 MG/1
8 TABLET, ORALLY DISINTEGRATING ORAL ONCE
COMMUNITY

## 2020-01-28 RX ORDER — CHOLECALCIFEROL (VITAMIN D3) 10(400)/ML
DROPS ORAL
COMMUNITY

## 2020-01-28 RX ORDER — DEXTROMETHORPHAN HYDROBROMIDE, GUAIFENESIN 5; 100 MG/5ML; MG/5ML
1000 LIQUID ORAL 3 TIMES DAILY
COMMUNITY

## 2020-01-28 RX ORDER — PANTOPRAZOLE SODIUM 40 MG/1
TABLET, DELAYED RELEASE ORAL
Status: ON HOLD | COMMUNITY
End: 2020-03-18 | Stop reason: HOSPADM

## 2020-01-28 RX ORDER — PROCHLORPERAZINE MALEATE 10 MG
10 TABLET ORAL EVERY 6 HOURS PRN
COMMUNITY

## 2020-01-28 RX ORDER — OLANZAPINE 5 MG/1
5 TABLET ORAL NIGHTLY
Status: ON HOLD | COMMUNITY
End: 2020-03-18 | Stop reason: HOSPADM

## 2020-01-28 NOTE — Clinical Note
Obtain OR note from 10/9/2019 for segmental resection of ileum and open repair of R femoral hernia done at [facility] in [place].

## 2020-01-28 NOTE — PATIENT INSTRUCTIONS
Add Creon - take 1 pill WITH MEAL, WHILE EATING FOOD    Pancrelipase capsules  What is this medicine?  PANCRELIPASE (pan cre LI pase) helps to improve digestion of food by replacing digestive enzymes. This medicine is used to treat health conditions that cause your body to produce less of these enzymes.  How should I use this medicine?  Take this medicine by mouth with a glass of water. Follow the directions on the prescription label. Take with food. Do not crush or chew the contents of the capsule. If you or your child have trouble swallowing, you may open the capsule and sprinkle the contents on soft foods that do not require chewing such as applesauce, pureed bananas, or pears. Swallow the mixture right away followed with water or juice. Do not store the mixture. If you are giving this medicine to an infant, you may sprinkle the contents directly into your child's mouth. Give the medicine right before each feeding of formula or breast milk. Do not mix capsule contents directly into formula or breast milk. Make sure the medicine is swallowed completely and that no medicine is left in the mouth. Take your doses at regular intervals. Do not take your medicine more often than directed.  A special MedGuide will be given to you by the pharmacist with each prescription and refill of delayed-release capsules (Creon, Zenpep, or Pancreaze). Be sure to read this information carefully each time.  Talk to your pediatrician regarding the use of this medicine in children. While this medicine may be prescribed for children for selected conditions precautions do apply.  What side effects may I notice from receiving this medicine?  Side effects that you should report to your doctor or health care professional as soon as possible:  · allergic reactions like skin rash, itching or hives, swelling of the face, lips, or tongue  · fever or chills, sore throat  · severe stomach pain or bloating  · shortness of breath  · skin  rash  · trouble passing stool  · vomiting  Side effects that usually do not require medical attention (report to your doctor or health care professional if they continue or are bothersome):  · constipation or diarrhea  · cough  · dizziness  · headache  · nausea  · stomach gas  · stomach pain  · weight loss  What may interact with this medicine?  · acarbose  · antacids containing calcium or magnesium  · iron  · miglitol  What if I miss a dose?  If you miss a dose, take it as soon as you can. If it is almost time for your next dose, take only that dose. Do not take double or extra doses.  Where should I keep my medicine?  Keep out of the reach of children.  Store at room temperature between 15 and 25 degrees C (59 and 77 degrees F). Do not refrigerate. Protect from moisture. Throw away any unused medicine after the expiration date.  What should I tell my health care provider before I take this medicine?  They need to know if you have any of these conditions:  · a history of intestinal blockage or a condition called 'fibrosing colonopathy'  · abnormally high uric acid in the blood  · diabetes  · gout  · kidney disease  · trouble swallowing capsules  · an unusual or allergic reaction to pancrelipase, pancreatin, pork, pork protein, other medicines, foods, dyes, or preservatives  · pregnant or trying to get pregnant  · breast-feeding  What should I watch for while using this medicine?  Visit your doctor for regular check ups. Talk to your doctor before you change brands of this medicine. Each brand has different amounts of enzymes.  You may need to be on a special diet while taking this medicine. Also, ask your doctor how much water you need to drink.  This medicine may increase your chance of having a rare bowel disorder. The risk of having this condition may be reduced by following the dosing directions that your healthcare professional gives you. Call your healthcare professional right away if you have any unusual or  severe stomach pain.  This medicine may increase blood uric acid levels, for example, worsening of gout, or painful, swollen joints. Call your healthcare professional right away if you have any of these symptoms.  Be careful if you open the capsule. This medicine can irritate the lungs if you breathe it in. Also, do not hold the medicine in your mouth or chew it. This may cause mouth sores.  This medicine may affect blood sugar levels. If you have diabetes, check with your doctor or health care professional before you change your diet or the dose of your diabetic medicine.  Women should inform their doctor if they wish to become pregnant or think they might be pregnant.  NOTE:This sheet is a summary. It may not cover all possible information. If you have questions about this medicine, talk to your doctor, pharmacist, or health care provider. Copyright© 2017 [employer]

## 2020-01-28 NOTE — PROGRESS NOTES
Encounter Date:  2020    Patient ID: Julee Hawkins  Age:  74 y.o. :  1945    Chief Complaint:  followup of head of panc NATALY     Interval History:  Ms. Hawkins returns to clinic from [place] accompanied by her . She brought 1 disc with images from recent PET scan.     She presented with painless jaundice and wt loss in 3/2019, dx head of pancreas NATALY 2019. Last seen as consult with Dr. Wall in 2019 with stage 1B resectable panc NATALY. UGI tumor board recommended neoadj chemotherapy.     Reviewed outside medical records.   She received 2 cycles of FOLFOXIRI, last one given on 2019. She was hospitalized after first tx with urosepsis, required R nephrostomy tube. Hospitalized again after 2nd tx with SBO requiring segmental resection of ileum with open repair of R femoral hernia repair on 10/9/2019.   Therefore her chemo regimen was converted to [place]/ Abraxane. She received 2 cycles of this and has had biochemical and radiographic response. In November CA 19-9 =1858, her level last month down to 461.     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 Wall and I personally reviewed the following images:   2020: CT A/P:  1. Ill-defined mass (pathology confirmed adenocarcinoma 2019) at the head of the pancreas with associated intra and extrahepatic biliary ductal dilatation and pancreatic ductal dilatation with a stent in the common bile duct.  This mass does not abut or encase the mesenteric arteries, specifically the SMA or GDA and does not involve the SMV or portal vein.  No evidence of distant metastasis.  2. Right-sided hydronephrosis with percutaneous nephrostomy tube in place.  3. Staple line in the right lower quadrant small bowel in the rectum, correlate with surgical history.  Moderate stool burden.  4. Two opacities in the  right lower lobe on the order of 1 cm which may be inflammatory versus infective, correlation with clinical symptoms in surveillance to resolution advised.  5. Cholecystectomy.    1/13/2020 PET:        Labs:  Reviewed in media    Past Medical History:   Diagnosis Date    Arthritis     Cancer     Breast cancer    Thyroid disease      Past Surgical History:   Procedure Laterality Date    APPENDECTOMY      BACK SURGERY      BREAST SURGERY      lumpectomy    CHOLECYSTECTOMY      ENDOSCOPIC ULTRASOUND OF UPPER GASTROINTESTINAL TRACT N/A 6/13/2019    Procedure: ULTRASOUND, UPPER GI TRACT, ENDOSCOPIC;  Surgeon: Jhonathan Abdul MD;  [facility]  Service: Endoscopy;  Laterality: N/A;    ERCP N/A 6/13/2019    Procedure: ERCP (ENDOSCOPIC RETROGRADE CHOLANGIOPANCREATOGRAPHY);  Surgeon: Jhonathan Abdul MD;  [facility]  Service: Endoscopy;  Laterality: N/A;    HYSTERECTOMY      rectovaginal fistula repair      before 2010, from BRCA chemotherapy    SBR Right 10/09/2019    with open repair of R femoral hernia     SPINE SURGERY       Current Outpatient Medications on File Prior to Visit   Medication Sig Dispense Refill    aspirin (ECOTRIN) 81 MG EC tablet Take 81 mg by mouth once daily.      b complex vitamins capsule Take 1 capsule by mouth once daily.      buPROPion (WELLBUTRIN XL) 150 MG TB24 tablet Take 150 mg by mouth once daily.      calcium carbonate 1250 MG capsule Take 1,250 mg by mouth 2 (two) times daily with meals.      celecoxib (CELEBREX) 200 MG capsule TK 1 C PO QAM  1    cycloSPORINE (RESTASIS) 0.05 % ophthalmic emulsion 1 drop 2 (two) times daily.      denosumab (PROLIA) 60 mg/mL Syrg Inject 60 mg into the skin.      ergocalciferol (VITAMIN D2) 50,000 unit Cap Take 50,000 Units by mouth every 7 days.      gabapentin (NEURONTIN) 300 MG capsule Take 300 mg by mouth 3 (three) times daily.      HYDROcodone-acetaminophen (NORCO) 7.5-325 mg per tablet  Take 1 tablet by mouth every 6 (six) hours as needed for Pain.      levothyroxine (SYNTHROID) 50 MCG tablet Take 50 mcg by mouth once daily.      multivitamin/iron/folic acid (CENTRUM COMPLETE ORAL) Take by mouth.      ondansetron (ZOFRAN-ODT) 4 MG TbDL DISSOLVE 1 TABLET UNDER TONGUE EVERY 6 HOURS AS NEEDED FOR NAUSEA AND VOMITING  0    oxyCODONE-acetaminophen (PERCOCET)  mg per tablet Take 1 tablet by mouth every 6 (six) hours as needed.  0    pantoprazole (PROTONIX) 40 MG tablet Take 40 mg by mouth once daily.      sertraline (ZOLOFT) 25 MG tablet Take 25 mg by mouth once daily.      tiZANidine (ZANAFLEX) 4 MG tablet Take 4 mg by mouth every 6 (six) hours as needed.      traMADol (ULTRAM) 50 mg tablet Take 50 mg by mouth every 6 (six) hours as needed for Pain.      TURMERIC ORAL Take by mouth.       Current Facility-Administered Medications on File Prior to Visit   Medication Dose Route Frequency Provider Last Rate Last Dose    [COMPLETED] iohexol (OMNIPAQUE 350) injection 75 mL  75 mL Intravenous ONCE PRN Quang Wall MD   75 mL at 01/27/20 1630     Review of patient's allergies indicates:   Allergen Reactions    Azithromycin Other (See Comments)     Stomach pain     Codeine Hallucinations       Family History:  Her family history includes Cancer in her brother and paternal uncle; Diabetes (age of onset: 87) in her father.     Social History:   reports that she quit smoking about 50 years ago. She has never used smokeless tobacco. She reports that she drinks about 2.0 standard drinks of alcohol per week. She reports that she does not use drugs.     ROS:     Review of Systems   Constitutional: Positive for activity change, fatigue and unexpected weight change (down 11#). Negative for appetite change and fever.   HENT: Negative for trouble swallowing.    Eyes: Negative for visual disturbance.   Respiratory: Negative for cough and shortness of breath.    Cardiovascular: Negative for chest  "pain and leg swelling.   Gastrointestinal: Negative for abdominal pain, diarrhea, nausea and vomiting.   Genitourinary: Negative for difficulty urinating.   Musculoskeletal: Positive for back pain (at R nephrostomy tube site). Negative for gait problem.   Skin: Negative for color change.   Neurological: Positive for weakness. Negative for light-headedness and numbness.   Psychiatric/Behavioral: Positive for sleep disturbance.     Pertinent positive/negatives detailed in HPI, all other systems negative.     Physical Exam:  /74 (BP Location: Left arm, Patient Position: Sitting, BP Method: Medium (Automatic))   Pulse 96   Temp 97.7 °F (36.5 °C) (Oral)   Ht 5' 2" (1.575 m)   Wt 48.5 kg (106 lb 14.8 oz)   BMI 19.56 kg/m²     Constitutional:  Non-toxic, no acute distress.  Performance status:  ECO  Eyes:  Sclerae anicteric, gaze symmetrical  Neck:  Trachea midline,  FROM  Resp:  Easy work of breathing, no wheezes  CV:  Regular pulse, no JVD  Abd:  Soft, non-tender, no masses, no hepatosplenomegaly, no ascites  Lymphatics:  No cervical, supraclavicular lymphadenopathy  Musculoskeletal:  Ambulatory, normal gait, + muscle wasting.  Extremities are symmetrical without lymphedema.  Neuro:  No gross deficits  Psych:  Awake, alert, oriented.  Answers and asks questions appropriately      ICD-10-CM ICD-9-CM    1. Malignant neoplasm of head of pancreas C25.0 157.0 Comprehensive metabolic panel      Cancer antigen 19-9      CBC auto differential      Prealbumin      Hemoglobin A1c      lipase-protease-amylase 24,000-76,000-120,000 units (CREON) 24,000-76,000 -120,000 unit capsule      Ambulatory referral to Physical Therapy   2. Severe protein-calorie malnutrition E43 262 Prealbumin      Ambulatory referral to Physical Therapy   3. Unintentional weight loss R63.4 783.21 Prealbumin   4. Vitamin D deficiency E55.9 268.9 Vitamin D   Plan   75 y/o female with head of panc CA, s/p neoadj chemotherapy 2 cycles FOLFOXIRI, 2 " cycles of [place]/ Abraxane, last cycle 1/2/2020. She did not tolerate FOLFOXIRI but has done significantly better with [place]/Abrax. She has had good biochemical and radiographic response. Her tumor appears to be resectable and without evidence of metastatic disease. Will tentatively set OR date for Whipple for Wed March 4, 2020.     She is deconditioned with weight loss/ loss of muscle mass. She would benefit from Prehab. Labs today. Reviewed nutrition and importance in protein - may add another protein shake to BID.  Referral to outpatient physical therapy locally - needs progressive/ graded exercise program to rebuild strength and stamina.   Add PERT, 90 day eRx for Creon sent to pharmacy. Written Rx information provided today.     Timed Up & Go (TUG) in seconds  Trial 1: 10.12  Trial 2: 8.21     Strength in kilograms  R: 16.6  L: 13.4    Obtain OR note from 10/9/2019 for segmental resection of ileum and open repair of R femoral hernia done at [facility] in [place], [place].     Follow up in 4 weeks (on 2/25/2020).      Questions were asked and answered to patient and 's satisfaction.      Pt seen in conjunction with Dr. Wall today.         Keri Hall NP  Surgical Oncology  [facility] [place], [place]  Office: [phone]  Fax: [phone]       CC: Dr. Billy      ADDENDUM: received OR note from 10/9/19, scan into media    ADDENDUM:   CA 19-9 = 819    Lab Results   Component Value Date    WBC 7.60 01/28/2020    HGB 9.6 (L) 01/28/2020    HCT 32.1 (L) 01/28/2020     (H) 01/28/2020     (H) 01/28/2020         Chemistry        Component Value Date/Time     (L) 01/28/2020 1136    K 4.0 01/28/2020 1136     01/28/2020 1136    CO2 22 (L) 01/28/2020 1136    BUN 22 01/28/2020 1136    CREATININE 1.2 01/28/2020 1136     (H) 01/28/2020 1136        Component Value Date/Time    CALCIUM 10.2 01/28/2020 1136    ALKPHOS 1,112 (H) 01/28/2020 1136    AST 52  (H) 01/28/2020 1136    ALT 47 (H) 01/28/2020 1136    BILITOT 0.6 01/28/2020 1136    ESTGFRAFRICA 51.1 (A) 01/28/2020 1136    EGFRNONAA 44.3 (A) 01/28/2020 1136        Lab Results   Component Value Date    PREALBUMIN 22 01/28/2020

## 2020-01-28 NOTE — PROGRESS NOTES
Subjective:       Patient ID: Julee Hawkins is a 74 y.o. female.    Chief Complaint:  Ureteral stricture      History of Present Illness  HPI  Patient is a 74 y.o. female who is new to our clinic and referred by their surgeon, Dr. Wall for evaluation of right ureteral stricture.    This patient has a history of a pancreatic mass.  She has undergone some cycles of chemotherapy and is planning a consolidative Whipple surgery in the relatively near future.  She developed right-sided hydronephrosis and was evaluated by urologist outside of [facility] in [place].  She underwent an attempted right retrograde ureteral stent placement.  This was unsuccessful.  She subsequently has had a nephrostomy tube in since that time.  She states that her nephrostomy tube was last changed on December 2nd 2019.    She would much prefer to have her nephrostomy tube removed.  She is open to internalization if that is possible.        Review of Systems  Review of Systems  All other systems reviewed and negative except pertinent positives noted in HPI.       Objective:     Physical Exam   Constitutional: She is oriented to person, place, and time. She appears well-developed and well-nourished. No distress.   HENT:   Head: Normocephalic and atraumatic.   Eyes: No scleral icterus.   Neck: No tracheal deviation present.   Pulmonary/Chest: Effort normal. No respiratory distress.   Neurological: She is alert and oriented to person, place, and time.   Psychiatric: She has a normal mood and affect. Her behavior is normal. Judgment and thought content normal.       Lab Review  Lab Results   Component Value Date    COLORU Yellow 01/28/2020    SPECGRAV 1.025 01/28/2020    PHUR 5.0 01/28/2020    NITRITE Negative 01/28/2020    KETONESU Negative 01/28/2020         Assessment:        1. Hydronephrosis, unspecified hydronephrosis type            Plan:     Hydronephrosis, unspecified hydronephrosis type  -     Urinalysis  -     Urine  culture      -urinalysis and culture  -I have explained the indication, risks, benefits, and alternatives of the procedure in detail.  The patient voices understanding and all questions have been answered.  The patient agrees to proceed as planned with cystoscopy, right retrograde pyelogram, right antegrade nephrostogram, possible right ureteral stent, possible right ureteroscopy.

## 2020-01-29 LAB — BACTERIA UR CULT: NO GROWTH

## 2020-02-04 ENCOUNTER — PATIENT MESSAGE (OUTPATIENT)
Dept: SURGERY | Facility: CLINIC | Age: 75
End: 2020-02-04

## 2020-02-05 ENCOUNTER — TELEPHONE (OUTPATIENT)
Dept: SURGERY | Facility: CLINIC | Age: 75
End: 2020-02-05

## 2020-02-05 ENCOUNTER — PATIENT MESSAGE (OUTPATIENT)
Dept: SURGERY | Facility: CLINIC | Age: 75
End: 2020-02-05

## 2020-02-06 ENCOUNTER — TELEPHONE (OUTPATIENT)
Dept: UROLOGY | Facility: CLINIC | Age: 75
End: 2020-02-06

## 2020-02-06 DIAGNOSIS — N13.5 URETERAL STRICTURE: Primary | ICD-10-CM

## 2020-02-07 ENCOUNTER — TELEPHONE (OUTPATIENT)
Dept: SURGERY | Facility: CLINIC | Age: 75
End: 2020-02-07

## 2020-02-07 NOTE — TELEPHONE ENCOUNTER
----- Message from Marsha Duenas MA sent at 2/7/2020 11:03 AM CST -----  Praveena,   Please see dr Josh Merritt's question below.  Paula  ----- Message -----  From: Coleman Merritt MD  Sent: 2/7/2020  10:25 AM CST  To: Marsha Duenas MA    Where was this stent placed, IR or GI ? I see that the patient had a PTC stenting so maybe IR did it.   Abdul failed before because of obstruction. If the stent was placed by outside GI then I'll try, if not then maybe IR can help.    ----- Message -----  From: Marsha Duenas MA  Sent: 2/5/2020  10:35 PM CST  To: Coleman Merritt MD    Can I add next week  ----- Message -----  From: Praveena Fuentes RN  Sent: 2/5/2020   1:41 PM CST  To: Marsha Duenas MA    This patient has been diagnosed with Cholangitis and Dr. Wall is wondering if this patient can be seen this week?

## 2020-02-07 NOTE — PROGRESS NOTES
[facility] UGI MULTIDISCIPLINARY TUMOR BOARD  PATIENT REVIEW FORM   ____________________________________________________________    CLINIC #: [id number]  DATE: 2/10/2020    DIAGNOSIS: pancreas NATALY    PRESENTER: [facility]    PATIENT SUMMARY: 73 y.o. female from [place] has hx of BRCA, s/p lumpectomy. She presented with painless jaundice, wt loss in March 2019.  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 In June 2019, + pathology for adenocarcinoma. She was presented to this [facility] tumor board conf in 7/2019 with recommendations to internal stent and proceed with neoadj chemotherapy.   Since then she completed 2 cycles of FOLFOXIRI on 9/25/2019 with great difficulty, requiring hospitalization with urosepsis and then SBO necessitating SBR and open repair of femoral hernia.   She was converted to gem/ abrax, received 2 cycles. CA 19-9 was >1800, now down to 461.   Reviewed PET and CT scans from last month. Ill defined mass in head of pancreas without fat plane.  Urology is evaluating nephrostomy and ureteral stricture. R sided U-U    BOARD RECOMMENDATIONS:   Proceed with Whipple with possible R sided U-U if urology unable to stent    CONSULT NEEDED:     [x] Surgery    [] Hem/Onc    [] Rad/Onc    [] Dietary                 [] Social Service    [] Psychology       [] AES  [] Radiology     Clinical Stage: Tumor  2  Node(s)  0   Metastasis 0     GROUP STAGE:  [] O    [] 1A    [x] IB    [] IIA    [] IIB     [] IIIA     [] IIIB     [] IIIC    []IV                               [] Local recurrence     [] Regional recurrence     [] Distant recurrence Metastatic site(s): none         [x] Kate'l Treatment Guidelines reviewed and care planned is consistent with guidelines.         (i.e., NCCN, NCI, PD, ACO, AUA, etc.)    PRESENTATION AT CANCER CONFERENCE:         [x] Prospective    [] Retrospective     [] Follow-Up

## 2020-02-10 ENCOUNTER — TELEPHONE (OUTPATIENT)
Dept: ENDOSCOPY | Facility: HOSPITAL | Age: 75
End: 2020-02-10

## 2020-02-10 ENCOUNTER — PATIENT MESSAGE (OUTPATIENT)
Dept: SURGERY | Facility: CLINIC | Age: 75
End: 2020-02-10

## 2020-02-10 ENCOUNTER — TUMOR BOARD CONFERENCE (OUTPATIENT)
Dept: SURGERY | Facility: CLINIC | Age: 75
End: 2020-02-10

## 2020-02-10 DIAGNOSIS — K83.1 BILIARY STRICTURE: Primary | ICD-10-CM

## 2020-02-11 ENCOUNTER — TELEPHONE (OUTPATIENT)
Dept: SURGERY | Facility: CLINIC | Age: 75
End: 2020-02-11

## 2020-02-11 ENCOUNTER — PATIENT MESSAGE (OUTPATIENT)
Dept: SURGERY | Facility: CLINIC | Age: 75
End: 2020-02-11

## 2020-02-11 ENCOUNTER — PATIENT MESSAGE (OUTPATIENT)
Dept: SURGERY | Facility: HOSPITAL | Age: 75
End: 2020-02-11

## 2020-02-12 ENCOUNTER — TELEPHONE (OUTPATIENT)
Dept: ENDOSCOPY | Facility: HOSPITAL | Age: 75
End: 2020-02-12

## 2020-02-12 PROBLEM — N13.5 URETERAL STRICTURE, RIGHT: Status: ACTIVE | Noted: 2020-02-12

## 2020-02-12 NOTE — TELEPHONE ENCOUNTER
Spoke with patient. ERCP scheduled for 2/14 at 11:30a. Reviewed prep instructions. Ms Hawkins verbalized understanding.

## 2020-02-12 NOTE — TELEPHONE ENCOUNTER
----- Message from Coleman Merritt MD sent at 2/11/2020  7:55 PM CST -----  Regular ERCP     ----- Message -----  From: Praveena Fuentes RN  Sent: 2/11/2020  12:29 PM CST  To: Marsha Duenas MA, Coleman Merritt MD    This patient just called into our office looking for some instructions for the proposed Friday procedure.  ----- Message -----  From: Coleman Merritt MD  Sent: 2/10/2020   2:15 PM CST  To: Marsha Duenas MA, Quang Wall MD, #    Haha, Thank you !  ----- Message -----  From: Quang Wall MD  Sent: 2/7/2020   6:16 PM CST  To: Marsha Duenas MA, Coleman Merritt MD, #    I have michelle in you.      ----- Message -----  From: Coleman Merritt MD  Sent: 2/7/2020   2:49 PM CST  To: Marsha Duenas MA, Quang Wall MD, #    Jared,   There is duodenal deformity and stricture from Jhonathan's note. So I'm not sure if we can reach there now but can always try.   Also, Monday is booked already. Do you think she can wait until next Thursday or Friday ? Is she developing cholangitis      ----- Message -----  From: Quang Wall MD  Sent: 2/7/2020  12:23 PM CST  To: Marsha Duenas MA, Coleman Merritt MD, #    Abdul arranged for an outside PTC/stent because of travel (I think) and that has been patent for a while.  I think it's patent now but has some sludge on scan, her alk phos is on the rise and she is having pain.  I think we should start from below, going from above will delay things and require another tube for weeks.  I don't trust outside rads for this one.    Coleman/Paula--can we get her in Monday for ERCP and if that doesn't fly we will admit and get IR to work on her?    Praveena--if she is feeling bad have her admitted and we will sort out in the hospital    ----- Message -----  From: Praveena Fuentes RN  Sent: 2/7/2020  12:09 PM CST  To: Quang Wall MD    Should I place a stat IR consult here or reach out Dr. Ritter for a stat  reevaluation?  ----- Message -----  From: Marsha Duenas MA  Sent: 2/7/2020  11:49 AM CST  To: Coleman Merritt MD, Praveena Fuentes, PAUL    This will have to go through IR  ----- Message -----  From: Praveena Fuentes RN  Sent: 2/7/2020  11:31 AM CST  To: Marsha Duenas MA, Coleman Merritt MD    Radiologist Dr. Kuldeep Munoz in [place], [place].  ----- Message -----  From: Marsha Duenas MA  Sent: 2/7/2020  11:03 AM CST  To: Coleman Merritt MD, Praveena Fuentes, PAUL Grissom,   Please see dr Josh Merritt's question below.  Paula  ----- Message -----  From: Coleman Merritt MD  Sent: 2/7/2020  10:25 AM CST  To: Marsha Duenas MA    Where was this stent placed, IR or GI ? I see that the patient had a PTC stenting so maybe IR did it.   Abdul failed before because of obstruction. If the stent was placed by outside GI then I'll try, if not then maybe IR can help.    ----- Message -----  From: Marsha Duenas MA  Sent: 2/5/2020  10:35 PM CST  To: Coleman Merritt MD    Can I add next week  ----- Message -----  From: Praveena Fuentes RN  Sent: 2/5/2020   1:41 PM CST  To: Marsha Duenas MA    This patient has been diagnosed with Cholangitis and Dr. Wall is wondering if this patient can be seen this week?

## 2020-02-13 ENCOUNTER — ANESTHESIA (OUTPATIENT)
Dept: SURGERY | Facility: HOSPITAL | Age: 75
End: 2020-02-13
Payer: COMMERCIAL

## 2020-02-13 ENCOUNTER — PATIENT MESSAGE (OUTPATIENT)
Dept: ADMINISTRATIVE | Facility: OTHER | Age: 75
End: 2020-02-13

## 2020-02-13 ENCOUNTER — ANESTHESIA EVENT (OUTPATIENT)
Dept: SURGERY | Facility: HOSPITAL | Age: 75
End: 2020-02-13
Payer: COMMERCIAL

## 2020-02-13 ENCOUNTER — ANESTHESIA EVENT (OUTPATIENT)
Dept: ENDOSCOPY | Facility: HOSPITAL | Age: 75
End: 2020-02-13
Payer: COMMERCIAL

## 2020-02-13 ENCOUNTER — HOSPITAL ENCOUNTER (OUTPATIENT)
Facility: HOSPITAL | Age: 75
Discharge: HOME | End: 2020-02-13
Attending: UROLOGY | Admitting: UROLOGY
Payer: COMMERCIAL

## 2020-02-13 ENCOUNTER — TELEPHONE (OUTPATIENT)
Dept: ENDOSCOPY | Facility: HOSPITAL | Age: 75
End: 2020-02-13

## 2020-02-13 VITALS
HEART RATE: 74 BPM | DIASTOLIC BLOOD PRESSURE: 58 MMHG | TEMPERATURE: 97 F | BODY MASS INDEX: 18.77 KG/M2 | WEIGHT: 102 LBS | OXYGEN SATURATION: 100 % | HEIGHT: 62 IN | SYSTOLIC BLOOD PRESSURE: 110 MMHG | RESPIRATION RATE: 16 BRPM

## 2020-02-13 DIAGNOSIS — N13.5 OBSTRUCTION OF URETER, UNSPECIFIED LATERALITY: ICD-10-CM

## 2020-02-13 DIAGNOSIS — N13.5 URETERAL STRICTURE, RIGHT: Primary | ICD-10-CM

## 2020-02-13 PROCEDURE — 71000044 HC DOSC ROUTINE RECOVERY FIRST HOUR: Performed by: UROLOGY

## 2020-02-13 PROCEDURE — 37000009 HC ANESTHESIA EA ADD 15 MINS: Performed by: UROLOGY

## 2020-02-13 PROCEDURE — 50431 PR INJECTION PX NEPHROSTOGRAM &/ URETEROGRAM, EXISTING ACCESS, INCL GUID, S&I: ICD-10-PCS | Mod: 51,RT,, | Performed by: UROLOGY

## 2020-02-13 PROCEDURE — 25000003 PHARM REV CODE 250: Performed by: NURSE ANESTHETIST, CERTIFIED REGISTERED

## 2020-02-13 PROCEDURE — 63600175 PHARM REV CODE 636 W HCPCS: Performed by: NURSE ANESTHETIST, CERTIFIED REGISTERED

## 2020-02-13 PROCEDURE — 52351 CYSTOURETERO & OR PYELOSCOPE: CPT | Mod: RT,,, | Performed by: UROLOGY

## 2020-02-13 PROCEDURE — 36000706: Performed by: UROLOGY

## 2020-02-13 PROCEDURE — D9220A PRA ANESTHESIA: Mod: ANES,,, | Performed by: ANESTHESIOLOGY

## 2020-02-13 PROCEDURE — 25500020 PHARM REV CODE 255: Performed by: UROLOGY

## 2020-02-13 PROCEDURE — 63600175 PHARM REV CODE 636 W HCPCS: Performed by: ANESTHESIOLOGY

## 2020-02-13 PROCEDURE — 63600175 PHARM REV CODE 636 W HCPCS: Performed by: UROLOGY

## 2020-02-13 PROCEDURE — D9220A PRA ANESTHESIA: ICD-10-PCS | Mod: ANES,,, | Performed by: ANESTHESIOLOGY

## 2020-02-13 PROCEDURE — 36000707: Performed by: UROLOGY

## 2020-02-13 PROCEDURE — D9220A PRA ANESTHESIA: Mod: CRNA,,, | Performed by: NURSE ANESTHETIST, CERTIFIED REGISTERED

## 2020-02-13 PROCEDURE — C1769 GUIDE WIRE: HCPCS | Performed by: UROLOGY

## 2020-02-13 PROCEDURE — 37000008 HC ANESTHESIA 1ST 15 MINUTES: Performed by: UROLOGY

## 2020-02-13 PROCEDURE — C1758 CATHETER, URETERAL: HCPCS | Performed by: UROLOGY

## 2020-02-13 PROCEDURE — 50431 NJX PX NFROSGRM &/URTRGRM: CPT | Mod: 51,RT,, | Performed by: UROLOGY

## 2020-02-13 PROCEDURE — 63600175 PHARM REV CODE 636 W HCPCS: Performed by: STUDENT IN AN ORGANIZED HEALTH CARE EDUCATION/TRAINING PROGRAM

## 2020-02-13 PROCEDURE — D9220A PRA ANESTHESIA: ICD-10-PCS | Mod: CRNA,,, | Performed by: NURSE ANESTHETIST, CERTIFIED REGISTERED

## 2020-02-13 PROCEDURE — 71000015 HC POSTOP RECOV 1ST HR: Performed by: UROLOGY

## 2020-02-13 PROCEDURE — 52351 PR CYSTO/URETERO/PYELOSCOPY, DX: ICD-10-PCS | Mod: RT,,, | Performed by: UROLOGY

## 2020-02-13 PROCEDURE — 71000016 HC POSTOP RECOV ADDL HR: Performed by: UROLOGY

## 2020-02-13 RX ORDER — FENTANYL CITRATE 50 UG/ML
INJECTION, SOLUTION INTRAMUSCULAR; INTRAVENOUS
Status: DISCONTINUED | OUTPATIENT
Start: 2020-02-13 | End: 2020-02-13

## 2020-02-13 RX ORDER — SODIUM CHLORIDE 9 MG/ML
INJECTION, SOLUTION INTRAVENOUS CONTINUOUS
Status: DISCONTINUED | OUTPATIENT
Start: 2020-02-13 | End: 2020-02-13 | Stop reason: HOSPADM

## 2020-02-13 RX ORDER — ONDANSETRON 2 MG/ML
INJECTION INTRAMUSCULAR; INTRAVENOUS
Status: DISCONTINUED
Start: 2020-02-13 | End: 2020-02-13 | Stop reason: HOSPADM

## 2020-02-13 RX ORDER — PROPOFOL 10 MG/ML
VIAL (ML) INTRAVENOUS
Status: DISCONTINUED | OUTPATIENT
Start: 2020-02-13 | End: 2020-02-13

## 2020-02-13 RX ORDER — SUCCINYLCHOLINE CHLORIDE 20 MG/ML
INJECTION INTRAMUSCULAR; INTRAVENOUS
Status: DISCONTINUED | OUTPATIENT
Start: 2020-02-13 | End: 2020-02-13

## 2020-02-13 RX ORDER — ONDANSETRON 2 MG/ML
INJECTION INTRAMUSCULAR; INTRAVENOUS
Status: DISCONTINUED | OUTPATIENT
Start: 2020-02-13 | End: 2020-02-13

## 2020-02-13 RX ORDER — MIDAZOLAM HYDROCHLORIDE 1 MG/ML
INJECTION, SOLUTION INTRAMUSCULAR; INTRAVENOUS
Status: DISCONTINUED | OUTPATIENT
Start: 2020-02-13 | End: 2020-02-13

## 2020-02-13 RX ORDER — ONDANSETRON 2 MG/ML
4 INJECTION INTRAMUSCULAR; INTRAVENOUS EVERY 6 HOURS PRN
Status: DISCONTINUED | OUTPATIENT
Start: 2020-02-13 | End: 2020-02-13 | Stop reason: HOSPADM

## 2020-02-13 RX ORDER — EPHEDRINE SULFATE 50 MG/ML
INJECTION, SOLUTION INTRAVENOUS
Status: DISCONTINUED | OUTPATIENT
Start: 2020-02-13 | End: 2020-02-13

## 2020-02-13 RX ORDER — LIDOCAINE HYDROCHLORIDE 10 MG/ML
1 INJECTION, SOLUTION EPIDURAL; INFILTRATION; INTRACAUDAL; PERINEURAL ONCE
Status: DISCONTINUED | OUTPATIENT
Start: 2020-02-13 | End: 2020-02-13 | Stop reason: HOSPADM

## 2020-02-13 RX ORDER — CEFAZOLIN SODIUM 1 G/3ML
2 INJECTION, POWDER, FOR SOLUTION INTRAMUSCULAR; INTRAVENOUS
Status: COMPLETED | OUTPATIENT
Start: 2020-02-13 | End: 2020-02-13

## 2020-02-13 RX ORDER — LIDOCAINE HYDROCHLORIDE 20 MG/ML
INJECTION INTRAVENOUS
Status: DISCONTINUED | OUTPATIENT
Start: 2020-02-13 | End: 2020-02-13

## 2020-02-13 RX ORDER — SODIUM CHLORIDE 9 MG/ML
INJECTION, SOLUTION INTRAVENOUS CONTINUOUS PRN
Status: DISCONTINUED | OUTPATIENT
Start: 2020-02-13 | End: 2020-02-13

## 2020-02-13 RX ADMIN — SODIUM CHLORIDE: 0.9 INJECTION, SOLUTION INTRAVENOUS at 10:02

## 2020-02-13 RX ADMIN — SUCCINYLCHOLINE CHLORIDE 40 MG: 20 INJECTION, SOLUTION INTRAMUSCULAR; INTRAVENOUS at 11:02

## 2020-02-13 RX ADMIN — EPHEDRINE SULFATE 10 MG: 50 INJECTION, SOLUTION INTRAMUSCULAR; INTRAVENOUS; SUBCUTANEOUS at 11:02

## 2020-02-13 RX ADMIN — LIDOCAINE HYDROCHLORIDE 20 MG: 20 INJECTION, SOLUTION INTRAVENOUS at 11:02

## 2020-02-13 RX ADMIN — PROPOFOL 150 MG: 10 INJECTION, EMULSION INTRAVENOUS at 11:02

## 2020-02-13 RX ADMIN — MIDAZOLAM HYDROCHLORIDE 1 MG: 1 INJECTION, SOLUTION INTRAMUSCULAR; INTRAVENOUS at 11:02

## 2020-02-13 RX ADMIN — ONDANSETRON 4 MG: 2 INJECTION INTRAMUSCULAR; INTRAVENOUS at 02:02

## 2020-02-13 RX ADMIN — CEFAZOLIN 1 G: 330 INJECTION, POWDER, FOR SOLUTION INTRAMUSCULAR; INTRAVENOUS at 11:02

## 2020-02-13 RX ADMIN — FENTANYL CITRATE 50 MCG: 50 INJECTION, SOLUTION INTRAMUSCULAR; INTRAVENOUS at 11:02

## 2020-02-13 RX ADMIN — FENTANYL CITRATE 25 MCG: 50 INJECTION, SOLUTION INTRAMUSCULAR; INTRAVENOUS at 11:02

## 2020-02-13 RX ADMIN — PROPOFOL 30 MG: 10 INJECTION, EMULSION INTRAVENOUS at 11:02

## 2020-02-13 RX ADMIN — ONDANSETRON 4 MG: 2 INJECTION INTRAMUSCULAR; INTRAVENOUS at 11:02

## 2020-02-13 NOTE — PLAN OF CARE
Pt and spouse received dc instructions. Pt's nausea controlled. No c/o pain. Spouse took pt via wheelchair to [facility]. Pt to have another procedure tomorrow.

## 2020-02-13 NOTE — OP NOTE
[facility]  Operative Note    Date: 02/13/2020    Pre-Op Diagnosis:   1. Right ureteral stricture  2. Pancreatic mass    Patient Active Problem List    Diagnosis Date Noted    Ureteral stricture, right 02/12/2020    Severe protein-calorie malnutrition 01/28/2020    Unintentional weight loss 01/28/2020    Vitamin D deficiency 01/28/2020    Malignant neoplasm of head of pancreas 06/07/2019     Post-Op Diagnosis: same    Procedure(s) Performed:   1.  Cystoscopy  2.  Right rigid ureteroscopy (diagnostic)  3.  Right antegrade nephrostogram  4.  Fluoroscopy < 1 hour    Specimen(s): none    Staff Surgeon: Moncho Rowell MD    Assistant Surgeon: Fady Montez MD; Deon Mares MD    Anesthesia: General endotracheal anesthesia    Indications: Julee Hawkins is a 74 y.o. female with a right ureteral stricture currently managed by right nephrostomy tube presenting for diagnostic work-up for potential future reconstructive efforts.    Findings:   - Moderately-sized cystocele which was reduced by inserting two 4x4 gauze sponges per vagina. Both were removed at the end of the case.  - Right antegrade nephrostogram demonstrated blunted calyces and hydronephrosis. There was a narrowing at the right UPJ and no contrast was able to be advanced past the proximal ureter.  - Bilateral ureteral orifices stenotic and difficult to identify.  - Unable to perform right retrograde pyelogram. Right intravesical ureter lumen appeared to be obliterated on rigid ureteroscopy.     Estimated Blood Loss: min    Drains: Right nephrostomy tube    Procedure in Detail:  After risks, benefits and possible complications of the procedure were explained, the patient elected to undergo the procedure and informed consent was obtained. All questions were answered in the mimi-operative area. The patient was transferred to the cystoscopy suite and placed in the supine position.  SCDs were applied and working. Anesthesia was  administered.  Once adequately sedated, the patient was placed in the dorsal lithotomy position and prepped and draped in the usual sterile fashion.  Time out was performed, mimi-procedural antibiotics were confirmed.     A rigid cystoscope in a 22 Fr sheath was introduced into the bladder per urethra. This passed easily. The entire urethra was visualized which showed no masses or strictures. There was a moderately-sized cystocele which we reduced by placing two 4x4 gauze sponges in the vagina. The right and left ureteral orifices were identified in the normal anatomic position however they were stenotic which made them difficult to identify. A right antegrade nephrostogram was performed which demonstrated blunted calyces and hydronephrosis. There was a narrowing at the right UPJ and no contrast was able to be advanced past the proximal ureter. A 5 Fr open-ended cone tipped ureteral catheter was inserted per our cystoscope and we attempted to perform a right retrograde pyelogram however contrast could not be advanced past the intravesical ureter. Our 5 Fr open-ended cone tipped catheter was removed and we attempted to unsuccessfully advance a motion wire through the right UO. A 5 Fr open-ended ureteral catheter was advanced over the motion wire to act as a buttress and the wire would still not advance past the intravesical ureter. Our motion wire was removed and an angled glidewire was advanced through the 5 Fr and again we were unsuccessful in advancing our wire. The glidewire and 5 Fr were removed. The bladder was drained and the cystoscope was removed.    We then inserted an 8 Fr rigid ureteroscope via the urethra and into the bladder. The right UO was identified and we were only able to advance the ureteroscope a small distance through the right UO until we identified an obliterated ureteral lumen. We unsuccessfully tried to advance a motion wire through the ureteroscope and up the ureter. At this point, we  decided to abort the case. We reinserted the cystoscope and drained the bladder. The two 4x4 gauze sponges were removed from the vagina. The patient was removed from lithotomy.    The patient tolerated the procedure well and was transferred to recovery in stable condition.    Disposition:  The patient will follow up with Dr. Rowell as needed.  We will set her up for a mag-3 renal scan to assess differential renal function for counseling purposes in preparation for her whipple procedure.  She will either need nephrostomy tube indefinitely, ileal ureter at a much later date after surgical recovery, or nephrectomy at the time of her whipple.  Findings discussed with Dr. Wall.     Fady Montez MD

## 2020-02-13 NOTE — ANESTHESIA POSTPROCEDURE EVALUATION
Anesthesia Post Evaluation    Patient: Julee Hawkins    Procedure(s) Performed: Procedure(s) (LRB):  CYSTOSCOPY, WITH RETROGRADE PYELOGRAM (Right)  Nephrostogram - antegrade (Right)  URETEROSCOPY (Right)    Final Anesthesia Type: general    Patient location during evaluation: PACU  Patient participation: Yes- Able to Participate  Level of consciousness: awake and alert  Post-procedure vital signs: reviewed and stable  Pain management: adequate  Airway patency: patent  CHRISTOPHER mitigation strategies: Extubation and recovery carried out in lateral, semiupright, or other nonsupine position  PONV status at discharge: No PONV  Anesthetic complications: no      Cardiovascular status: hemodynamically stable  Respiratory status: unassisted, spontaneous ventilation and room air  Hydration status: euvolemic  Follow-up not needed.          Vitals Value Taken Time   /64 2/13/2020  2:16 PM   Temp 36.2 °C (97.2 °F) 2/13/2020 12:00 PM   Pulse 75 2/13/2020  2:26 PM   Resp 18 2/13/2020  2:00 PM   SpO2 100 % 2/13/2020  2:26 PM   Vitals shown include unvalidated device data.      No case tracking events are documented in the log.      Pain/Ana Cristina Score: No data recorded

## 2020-02-13 NOTE — TRANSFER OF CARE
"Anesthesia Transfer of Care Note    Patient: Julee Hawkins    Procedure(s) Performed: Procedure(s) (LRB):  CYSTOSCOPY, WITH RETROGRADE PYELOGRAM (Right)  Nephrostogram - antegrade (Right)  URETEROSCOPY (Right)    Patient location: PACU    Anesthesia Type: general    Transport from OR: Transported from OR on 6-10 L/min O2 by face mask with adequate spontaneous ventilation    Post pain: adequate analgesia    Post assessment: no apparent anesthetic complications and tolerated procedure well    Post vital signs: stable    Level of consciousness: awake, alert and oriented    Nausea/Vomiting: no nausea/vomiting    Complications: none    Transfer of care protocol was followed      Last vitals:   Visit Vitals  /79 (BP Location: Left arm, Patient Position: Lying)   Pulse 85   Temp 36.7 °C (98 °F) (Oral)   Resp 18   Ht 5' 2" (1.575 m)   Wt 46.3 kg (102 lb)   SpO2 100%   Breastfeeding? No   BMI 18.66 kg/m²     "

## 2020-02-13 NOTE — INTERVAL H&P NOTE
The patient has been examined and the H&P has been reviewed:    I concur with the findings and no changes have occurred since H&P was written.     Urine dipstick - negative for all components.    Anesthesia/Surgery risks, benefits and alternative options discussed and understood by patient/family.          Active Hospital Problems    Diagnosis  POA    *Ureteral stricture, right [N13.5]  Yes      Resolved Hospital Problems   No resolved problems to display.

## 2020-02-13 NOTE — DISCHARGE SUMMARY
[facility]  Discharge Note  Short Stay    Admit Date: 2/13/2020    Discharge Date and Time: 02/13/2020 11:57 AM      Attending Physician: Moncho Rowell MD     Discharge Provider: Fady Montez MD    Diagnoses:  Active Hospital Problems    Diagnosis  POA    *Ureteral stricture, right [N13.5]  Yes      Resolved Hospital Problems   No resolved problems to display.       Discharged Condition: good    Hospital Course: Patient was admitted for cystoscopy, right antegrade nephrostogram, right retrograde pyelogram, and right diagnostic ureteroscopy and tolerated the procedure well with no complications. The patient was discharged home in good condition on the same day.       Final Diagnoses: Same as principal problem.    Disposition: Home or Self Care    Follow up/Patient Instructions:    Medications:  Reconciled Home Medications:   Current Discharge Medication List      CONTINUE these medications which have NOT CHANGED    Details   acetaminophen (TYLENOL) 650 MG TbSR Take by mouth.      aspirin (ECOTRIN) 81 MG EC tablet Take 81 mg by mouth once daily.      b complex vitamins capsule Take 1 capsule by mouth once daily.      buPROPion (WELLBUTRIN XL) 150 MG TB24 tablet Take 150 mg by mouth once daily.      calcium carbonate 1250 MG capsule Take 1,250 mg by mouth 2 (two) times daily with meals.      celecoxib (CELEBREX) 200 MG capsule TK 1 C PO QAM  Refills: 1      cholecalciferol, vitamin D3, (VITAMIN D3) 10 mcg/mL (400 unit/mL) Drop Take by mouth.      cycloSPORINE (RESTASIS) 0.05 % ophthalmic emulsion 1 drop 2 (two) times daily.      !! denosumab (PROLIA) 60 mg/mL Syrg Inject 60 mg into the skin.      !! denosumab (PROLIA) 60 mg/mL Syrg Inject into the skin.      ergocalciferol (VITAMIN D2) 50,000 unit Cap Take 50,000 Units by mouth every 7 days.      gabapentin (NEURONTIN) 300 MG capsule Take 300 mg by mouth 3 (three) times daily.      HYDROcodone-acetaminophen (NORCO) 7.5-325 mg per tablet Take 1  tablet by mouth every 6 (six) hours as needed for Pain.      levothyroxine (SYNTHROID) 50 MCG tablet Take 50 mcg by mouth once daily.      lipase-protease-amylase 24,000-76,000-120,000 units (CREON) 24,000-76,000 -120,000 unit capsule Take 1 capsule by mouth 3 (three) times daily with meals.  Qty: 270 capsule, Refills: 3    Associated Diagnoses: Malignant neoplasm of head of pancreas      multivitamin/iron/folic acid (CENTRUM COMPLETE ORAL) Take by mouth.      OLANZapine (ZYPREXA) 5 MG tablet Take 5 mg by mouth every evening.      !! ondansetron (ZOFRAN-ODT) 4 MG TbDL DISSOLVE 1 TABLET UNDER TONGUE EVERY 6 HOURS AS NEEDED FOR NAUSEA AND VOMITING  Refills: 0      !! ondansetron (ZOFRAN-ODT) 8 MG TbDL Take 8 mg by mouth once.      oxyCODONE-acetaminophen (PERCOCET)  mg per tablet Take 1 tablet by mouth every 6 (six) hours as needed.  Refills: 0      !! pantoprazole (PROTONIX) 40 MG tablet Take 40 mg by mouth once daily.      !! pantoprazole (PROTONIX) 40 MG tablet Take by mouth.      prochlorperazine (COMPAZINE) 10 MG tablet Take 10 mg by mouth every 6 (six) hours as needed.      sertraline (ZOLOFT) 25 MG tablet Take 25 mg by mouth once daily.      tiZANidine (ZANAFLEX) 4 MG tablet Take 4 mg by mouth every 6 (six) hours as needed.      traMADol (ULTRAM) 50 mg tablet Take 50 mg by mouth every 6 (six) hours as needed for Pain.      TURMERIC ORAL Take by mouth.       !! - Potential duplicate medications found. Please discuss with provider.        Discharge Procedure Orders   Diet Adult Regular     Notify your health care provider if you experience any of the following:  temperature >100.4     Notify your health care provider if you experience any of the following:  persistent nausea and vomiting or diarrhea     Notify your health care provider if you experience any of the following:  severe uncontrolled pain     Notify your health care provider if you experience any of the following:  difficulty breathing or  increased cough     Notify your health care provider if you experience any of the following:  severe persistent headache     Notify your health care provider if you experience any of the following:  worsening rash     Notify your health care provider if you experience any of the following:  persistent dizziness, light-headedness, or visual disturbances     Notify your health care provider if you experience any of the following:  increased confusion or weakness     Activity as tolerated     Follow-up Information     Moncho Rowell MD.    Specialty:  Urology  Why:  As needed  Contact information:  [address]  [facility] [id number] [phone]

## 2020-02-13 NOTE — ANESTHESIA PREPROCEDURE EVALUATION
02/13/2020  Julee Hawkins is a 74 y.o., female with pancreatic cancer and ureteral stricture. Scheduled for retrograde pyelogram, cysto, and stent placement.     Anesthesia Evaluation    I have reviewed the Patient Summary Reports.      I have reviewed the Medications.     Review of Systems  Anesthesia Hx:  No problems with previous Anesthesia  History of prior surgery of interest to airway management or planning: Previous anesthesia: General Denies Family Hx of Anesthesia complications.   Denies Personal Hx of Anesthesia complications.   Social:  Non-Smoker, No Alcohol Use    Hematology/Oncology:        Current/Recent Cancer.   Cardiovascular:  Cardiovascular Normal     Hepatic/GI:   Pancreatic mass   Neurological:  Neurology Normal    Endocrine:  Endocrine Normal        Physical Exam  General:  Obesity    Airway/Jaw/Neck:  Airway Findings: Mouth Opening: Normal Tongue: Normal  General Airway Assessment: Adult  Mallampati: I  TM Distance: Normal, at least 6 cm         Dental:  DENTAL FINDINGS: Normal   Chest/Lungs:  Chest/Lungs Findings: Clear to auscultation, Normal Respiratory Rate     Heart/Vascular:  Heart Findings: Rate: Normal  Rhythm: Regular Rhythm        Mental Status:  Mental Status Findings:  Cooperative, Normally Active child         Anesthesia Plan  Type of Anesthesia, risks & benefits discussed:  Anesthesia Type:  general  Patient's Preference:   Intra-op Monitoring Plan: standard ASA monitors  Intra-op Monitoring Plan Comments:   Post Op Pain Control Plan:   Post Op Pain Control Plan Comments:   Induction:   IV  Beta Blocker:  Patient is not currently on a Beta-Blocker (No further documentation required).       Informed Consent: Patient understands risks and agrees with Anesthesia plan.  Questions answered. Anesthesia consent signed with patient.  ASA Score: 2     Day of Surgery Review  of History & Physical:    H&P update referred to the surgeon.         Ready For Surgery From Anesthesia Perspective.

## 2020-02-13 NOTE — DISCHARGE INSTRUCTIONS
Post Cystoscopy Instructions  Do not strain to have a bowel movement  No strenuous exercise x 7 days  No driving while you are on narcotic pain medications or if your rebollar  catheter is in place    You can expect:  To pass stone fragments if you had a stone procedure  Have pain when you void from your stent if you have a stent in place  See blood in your urine if you have a stent in place    If you have a catheter, please return to the ER if your catheter stops draining or you are having abdominal pain.    Call the doctor if:   Temperature is greater than 101F   Persistent vomiting and inability to keep food down   Inability to void if you do not have a catheter

## 2020-02-14 ENCOUNTER — ANESTHESIA (OUTPATIENT)
Dept: ENDOSCOPY | Facility: HOSPITAL | Age: 75
End: 2020-02-14
Payer: COMMERCIAL

## 2020-02-14 PROBLEM — K83.1 BILIARY OBSTRUCTION: Status: ACTIVE | Noted: 2020-02-14

## 2020-02-14 PROCEDURE — D9220A PRA ANESTHESIA: Mod: ANES,,, | Performed by: ANESTHESIOLOGY

## 2020-02-14 PROCEDURE — D9220A PRA ANESTHESIA: ICD-10-PCS | Mod: ANES,,, | Performed by: ANESTHESIOLOGY

## 2020-02-14 PROCEDURE — 63600175 PHARM REV CODE 636 W HCPCS: Performed by: NURSE ANESTHETIST, CERTIFIED REGISTERED

## 2020-02-14 PROCEDURE — 25000003 PHARM REV CODE 250: Performed by: NURSE ANESTHETIST, CERTIFIED REGISTERED

## 2020-02-14 PROCEDURE — D9220A PRA ANESTHESIA: Mod: CRNA,,, | Performed by: NURSE ANESTHETIST, CERTIFIED REGISTERED

## 2020-02-14 PROCEDURE — 63600175 PHARM REV CODE 636 W HCPCS: Performed by: INTERNAL MEDICINE

## 2020-02-14 PROCEDURE — D9220A PRA ANESTHESIA: ICD-10-PCS | Mod: CRNA,,, | Performed by: NURSE ANESTHETIST, CERTIFIED REGISTERED

## 2020-02-14 RX ORDER — ROCURONIUM BROMIDE 10 MG/ML
INJECTION, SOLUTION INTRAVENOUS
Status: DISCONTINUED | OUTPATIENT
Start: 2020-02-14 | End: 2020-02-14

## 2020-02-14 RX ORDER — PROPOFOL 10 MG/ML
VIAL (ML) INTRAVENOUS CONTINUOUS PRN
Status: DISCONTINUED | OUTPATIENT
Start: 2020-02-14 | End: 2020-02-14

## 2020-02-14 RX ORDER — SUCCINYLCHOLINE CHLORIDE 20 MG/ML
INJECTION INTRAMUSCULAR; INTRAVENOUS
Status: DISCONTINUED | OUTPATIENT
Start: 2020-02-14 | End: 2020-02-14

## 2020-02-14 RX ORDER — PROPOFOL 10 MG/ML
VIAL (ML) INTRAVENOUS
Status: DISCONTINUED | OUTPATIENT
Start: 2020-02-14 | End: 2020-02-14

## 2020-02-14 RX ORDER — KETAMINE HCL IN 0.9 % NACL 50 MG/5 ML
SYRINGE (ML) INTRAVENOUS
Status: DISCONTINUED | OUTPATIENT
Start: 2020-02-14 | End: 2020-02-14

## 2020-02-14 RX ORDER — LIDOCAINE HYDROCHLORIDE 20 MG/ML
INJECTION INTRAVENOUS
Status: DISCONTINUED | OUTPATIENT
Start: 2020-02-14 | End: 2020-02-14

## 2020-02-14 RX ORDER — FENTANYL CITRATE 50 UG/ML
INJECTION, SOLUTION INTRAMUSCULAR; INTRAVENOUS
Status: DISCONTINUED | OUTPATIENT
Start: 2020-02-14 | End: 2020-02-14

## 2020-02-14 RX ORDER — GLYCOPYRROLATE 0.2 MG/ML
INJECTION INTRAMUSCULAR; INTRAVENOUS
Status: DISCONTINUED | OUTPATIENT
Start: 2020-02-14 | End: 2020-02-14

## 2020-02-14 RX ADMIN — ROCURONIUM BROMIDE 5 MG: 10 INJECTION, SOLUTION INTRAVENOUS at 12:02

## 2020-02-14 RX ADMIN — FENTANYL CITRATE 50 MCG: 50 INJECTION INTRAMUSCULAR; INTRAVENOUS at 12:02

## 2020-02-14 RX ADMIN — LIDOCAINE HYDROCHLORIDE 50 MG: 20 INJECTION, SOLUTION INTRAVENOUS at 12:02

## 2020-02-14 RX ADMIN — PROPOFOL 20 MG: 10 INJECTION, EMULSION INTRAVENOUS at 12:02

## 2020-02-14 RX ADMIN — FENTANYL CITRATE 50 MCG: 50 INJECTION INTRAMUSCULAR; INTRAVENOUS at 01:02

## 2020-02-14 RX ADMIN — SUCCINYLCHOLINE CHLORIDE 140 MG: 20 INJECTION, SOLUTION INTRAMUSCULAR; INTRAVENOUS at 12:02

## 2020-02-14 RX ADMIN — Medication 15 MG: at 12:02

## 2020-02-14 RX ADMIN — GLYCOPYRROLATE 0.2 MG: 0.2 INJECTION, SOLUTION INTRAMUSCULAR; INTRAVENOUS at 12:02

## 2020-02-14 RX ADMIN — PROPOFOL 100 MCG/KG/MIN: 10 INJECTION, EMULSION INTRAVENOUS at 12:02

## 2020-02-14 RX ADMIN — SODIUM CHLORIDE: 0.9 INJECTION, SOLUTION INTRAVENOUS at 11:02

## 2020-02-14 RX ADMIN — Medication 10 MG: at 01:02

## 2020-02-14 NOTE — ANESTHESIA PREPROCEDURE EVALUATION
02/14/2020  Julee Hawkins is a 74 y.o., female with pancreatic cancer and ureteral stricture.    Pre-op Assessment    I have reviewed the Patient Summary Reports.      I have reviewed the Medications.     Review of Systems  Anesthesia Hx:  No problems with previous Anesthesia  History of prior surgery of interest to airway management or planning: Previous anesthesia: General Denies Family Hx of Anesthesia complications.   Denies Personal Hx of Anesthesia complications.   Social:  Non-Smoker, No Alcohol Use    Hematology/Oncology:        Current/Recent Cancer.   Cardiovascular:  Cardiovascular Normal     Hepatic/GI:   Pancreatic mass   Neurological:  Neurology Normal    Endocrine:  Endocrine Normal        Physical Exam  General:  Obesity    Airway/Jaw/Neck:  Airway Findings: Mouth Opening: Normal Tongue: Normal  General Airway Assessment: Adult  Mallampati: I  TM Distance: Normal, at least 6 cm         Dental:  DENTAL FINDINGS: Normal   Chest/Lungs:  Chest/Lungs Findings: Clear to auscultation, Normal Respiratory Rate     Heart/Vascular:  Heart Findings: Rate: Normal  Rhythm: Regular Rhythm        Mental Status:  Mental Status Findings:  Cooperative, Normally Active child         Anesthesia Plan  Type of Anesthesia, risks & benefits discussed:  Anesthesia Type:  general  Patient's Preference:   Intra-op Monitoring Plan: standard ASA monitors  Intra-op Monitoring Plan Comments:   Post Op Pain Control Plan:   Post Op Pain Control Plan Comments:   Induction:   IV  Beta Blocker:  Patient is not currently on a Beta-Blocker (No further documentation required).       Informed Consent: Patient understands risks and agrees with Anesthesia plan.  Questions answered. Anesthesia consent signed with patient.  ASA Score: 2     Day of Surgery Review of History & Physical:    H&P update referred to the provider.          Ready For Surgery From Anesthesia Perspective.

## 2020-02-14 NOTE — TRANSFER OF CARE
"Anesthesia Transfer of Care Note    Patient: Julee Hawkins    Procedure(s) Performed: Procedure(s) (LRB):  ERCP (ENDOSCOPIC RETROGRADE CHOLANGIOPANCREATOGRAPHY) (N/A)    Patient location: PACU    Anesthesia Type: general    Transport from OR: Transported from OR on room air with adequate spontaneous ventilation    Post pain: adequate analgesia    Post assessment: no apparent anesthetic complications and tolerated procedure well    Post vital signs: stable    Level of consciousness: awake    Nausea/Vomiting: no nausea/vomiting    Complications: none    Transfer of care protocol was followed      Last vitals:   Visit Vitals  /75 (BP Location: Left arm, Patient Position: Lying)   Pulse 83   Temp 36.5 °C (97.7 °F) (Temporal)   Resp 14   Ht 5' 2" (1.575 m)   Wt 46.3 kg (102 lb)   SpO2 100%   Breastfeeding? No   BMI 18.66 kg/m²     "

## 2020-02-14 NOTE — ANESTHESIA POSTPROCEDURE EVALUATION
Anesthesia Post Evaluation    Patient: Julee Hawkins    Procedure(s) Performed: Procedure(s) (LRB):  ERCP (ENDOSCOPIC RETROGRADE CHOLANGIOPANCREATOGRAPHY) (N/A)    Final Anesthesia Type: general    Patient location during evaluation: PACU  Patient participation: Yes- Able to Participate  Level of consciousness: awake and alert  Post-procedure vital signs: reviewed and stable  Pain management: adequate  Airway patency: patent    PONV status at discharge: No PONV  Anesthetic complications: no      Cardiovascular status: blood pressure returned to baseline  Respiratory status: unassisted  Hydration status: euvolemic  Follow-up not needed.          Vitals Value Taken Time   /76 2/14/2020  2:47 PM   Temp 36.2 °C (97.2 °F) 2/14/2020  2:30 PM   Pulse 80 2/14/2020  3:00 PM   Resp 12 2/14/2020  2:51 PM   SpO2 100 % 2/14/2020  3:00 PM   Vitals shown include unvalidated device data.      Event Time     Out of Recovery 14:55:00          Pain/Ana Cristina Score: Pain Rating Prior to Med Admin: 6 (2/14/2020  2:30 PM)  Ana Cristina Score: 10 (2/14/2020  2:50 PM)

## 2020-02-17 DIAGNOSIS — N13.5 URETERAL STRICTURE: Primary | ICD-10-CM

## 2020-02-27 ENCOUNTER — HOSPITAL ENCOUNTER (OUTPATIENT)
Dept: RADIOLOGY | Facility: HOSPITAL | Age: 75
Discharge: HOME | End: 2020-02-27
Attending: UROLOGY
Payer: COMMERCIAL

## 2020-02-27 ENCOUNTER — OFFICE VISIT (OUTPATIENT)
Dept: SURGERY | Facility: CLINIC | Age: 75
End: 2020-02-27
Payer: COMMERCIAL

## 2020-02-27 ENCOUNTER — RESEARCH ENCOUNTER (OUTPATIENT)
Dept: RESEARCH | Facility: HOSPITAL | Age: 75
End: 2020-02-27

## 2020-02-27 VITALS
HEIGHT: 62 IN | TEMPERATURE: 97 F | SYSTOLIC BLOOD PRESSURE: 111 MMHG | WEIGHT: 101.63 LBS | HEART RATE: 74 BPM | DIASTOLIC BLOOD PRESSURE: 76 MMHG | BODY MASS INDEX: 18.7 KG/M2

## 2020-02-27 DIAGNOSIS — E43 SEVERE PROTEIN-CALORIE MALNUTRITION: ICD-10-CM

## 2020-02-27 DIAGNOSIS — N13.5 URETERAL STRICTURE: ICD-10-CM

## 2020-02-27 DIAGNOSIS — C25.0 MALIGNANT NEOPLASM OF HEAD OF PANCREAS: Primary | ICD-10-CM

## 2020-02-27 DIAGNOSIS — R63.4 UNINTENTIONAL WEIGHT LOSS: ICD-10-CM

## 2020-02-27 DIAGNOSIS — N13.5 URETERAL STRICTURE, RIGHT: ICD-10-CM

## 2020-02-27 PROCEDURE — 99214 PR OFFICE/OUTPT VISIT, EST, LEVL IV, 30-39 MIN: ICD-10-PCS | Mod: S$GLB,,, | Performed by: NURSE PRACTITIONER

## 2020-02-27 PROCEDURE — 78708 K FLOW/FUNCT IMAGE W/DRUG: CPT | Mod: 26,,, | Performed by: RADIOLOGY

## 2020-02-27 PROCEDURE — 63600175 PHARM REV CODE 636 W HCPCS: Performed by: UROLOGY

## 2020-02-27 PROCEDURE — 99999 PR PBB SHADOW E&M-EST. PATIENT-LVL V: CPT | Mod: PBBFAC,,, | Performed by: NURSE PRACTITIONER

## 2020-02-27 PROCEDURE — 99999 PR PBB SHADOW E&M-EST. PATIENT-LVL V: ICD-10-PCS | Mod: PBBFAC,,, | Performed by: NURSE PRACTITIONER

## 2020-02-27 PROCEDURE — 99214 OFFICE O/P EST MOD 30 MIN: CPT | Mod: S$GLB,,, | Performed by: NURSE PRACTITIONER

## 2020-02-27 PROCEDURE — A9562 TC99M MERTIATIDE: HCPCS

## 2020-02-27 PROCEDURE — 78708 NM RENOGRAM WITH LASIX: ICD-10-PCS | Mod: 26,,, | Performed by: RADIOLOGY

## 2020-02-27 RX ORDER — FUROSEMIDE 10 MG/ML
40 INJECTION INTRAMUSCULAR; INTRAVENOUS ONCE
Status: COMPLETED | OUTPATIENT
Start: 2020-02-27 | End: 2020-02-27

## 2020-02-27 RX ADMIN — FUROSEMIDE 40 MG: 10 INJECTION, SOLUTION INTRAMUSCULAR; INTRAVENOUS at 01:02

## 2020-02-27 NOTE — PROGRESS NOTES
Encounter Date:  2020    Patient ID: Julee Hawkins  Age:  74 y.o. 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 Billy    2020:  s/p neoadj chemotherapy 2 cycles FOLFOXIRI ending 2019, she suffered urosepsis, required R nephrostomy tube. Hospitalized again after 2nd tx with SBO requiring segmental resection of ileum with open repair of R femoral hernia repair on 10/9/2019.   2 cycles of [place]/ Abraxane, last cycle 2020. She did not tolerate FOLFOXIRI but has done significantly better with [place]/Abrax. Had good biochemical and radiographic response. Her tumor appears to be resectable and without evidence of metastatic disease. Started on PERT.    Interval History:  Ms. Hawkins returns to clinic from [place] accompanied by her . She was seen last month, noted to be physically deconditioned and started PREHAB. She has started ambulatory PT closer to home, makes her appointments about half the time. She has missed PT appts r/t other doctor's visits. Overall she feels stronger.  She reports she did not tolerate PERT, made her feel worse so she stopped taking Creon. She says she has a good appetite, denies N/V/D, tolerating at least 2 meals daily.     She was seen by nephrology, Dr. Rowell; she underwent cystoscopy, right antegrade nephrostogram, right retrograde pyelogram, and right diagnostic ureteroscopy  2020. R nephrostomy remains in place,  flushes QD. She report some urine output but believes she voids more urine than tube puts out. She is scheduled for Mag scan in nuclear medicine study later today.     She underwent ERCP per Dr. Josh Merritt 2020 with duodenal stent placement. She states she is feeling significantly better since then, less abd  discomfort.     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 Ill-defined mass (pathology confirmed adenocarcinoma June 2019) at the head of the pancreas with associated intra and extrahepatic biliary ductal dilatation and pancreatic ductal dilatation with a stent in the common bile duct.  This mass does not abut or encase the mesenteric arteries, specifically the SMA or GDA and does not involve the SMV or portal vein.  No evidence of distant metastasis.  2. Right-sided hydronephrosis with percutaneous nephrostomy tube in place.  3. Staple line in the right lower quadrant small bowel in the rectum, correlate with surgical history.  Moderate stool burden.  4. Two opacities in the right lower lobe on the order of 1 cm which may be inflammatory versus infective, correlation with clinical symptoms in surveillance to resolution advised.  5. Cholecystectomy.    Labs: CA 19-9 = 819  Lab Results   Component Value Date    WBC 7.60 01/28/2020    HGB 9.6 (L) 01/28/2020    HCT 32.1 (L) 01/28/2020     (H) 01/28/2020     (H) 01/28/2020       Chemistry        Component Value Date/Time     (L) 01/28/2020 1136    K 4.0 01/28/2020 1136     01/28/2020 1136    CO2 22 (L) 01/28/2020 1136    BUN 22 01/28/2020 1136    CREATININE 1.2 01/28/2020 1136     (H) 01/28/2020 1136        Component Value Date/Time    CALCIUM 10.2 01/28/2020 1136    ALKPHOS 1,112 (H) 01/28/2020 1136    AST 52 (H) 01/28/2020 1136    ALT 47 (H) 01/28/2020 1136    BILITOT 0.6 01/28/2020 1136    ESTGFRAFRICA 51.1 (A) 01/28/2020 1136    EGFRNONAA 44.3 (A) 01/28/2020 1136        Lab Results   Component Value Date    PREALBUMIN 22 01/28/2020     Lab Results   Component Value Date    HGBA1C 4.9 01/28/2020       Past Medical History:   Diagnosis Date    Arthritis     Biliary stricture      Cancer     Breast cancer    Cholangitis     Dilated bile duct     Duodenal stenosis     Jaundice     Pancreas cyst     Pancreatic mass     Protein calorie malnutrition     Thyroid disease     Ureteral stricture, right     Vitamin D deficiency     Weight loss, unintentional      Past Surgical History:   Procedure Laterality Date    ANTEGRADE NEPHROSTOGRAPHY Right 2/13/2020    Procedure: Nephrostogram - antegrade;  Surgeon: Moncho Rowell MD;  [facility];  Service: Urology;  Laterality: Right;    APPENDECTOMY      BACK SURGERY      BREAST SURGERY      lumpectomy    CHOLECYSTECTOMY      CYSTOSCOPY W/ RETROGRADES Right 2/13/2020    Procedure: CYSTOSCOPY, WITH RETROGRADE PYELOGRAM;  Surgeon: Moncho Rowell MD;  Location: [facility] OR 1ST FLR;  Service: Urology;  Laterality: Right;  1hr    ENDOSCOPIC ULTRASOUND OF UPPER GASTROINTESTINAL TRACT N/A 6/13/2019    Procedure: ULTRASOUND, UPPER GI TRACT, ENDOSCOPIC;  Surgeon: Jhonathan Abdul MD;  Location: [facility] ENDO (2ND FLR);  Service: Endoscopy;  Laterality: N/A;    ERCP N/A 6/13/2019    Procedure: ERCP (ENDOSCOPIC RETROGRADE CHOLANGIOPANCREATOGRAPHY);  Surgeon: Jhonathan Abdul MD;  Location: [facility] ENDO (2ND FLR);  Service: Endoscopy;  Laterality: N/A;    ERCP N/A 2/14/2020    Procedure: ERCP (ENDOSCOPIC RETROGRADE CHOLANGIOPANCREATOGRAPHY);  Surgeon: Coleman Merritt MD;  Location: [facility] ENDO (2ND FLR);  Service: Endoscopy;  Laterality: N/A;    HYSTERECTOMY      rectovaginal fistula repair      before 2010, from BRCA chemotherapy    SBR Right 10/09/2019    with open repair of R femoral hernia     SPINE SURGERY      URETEROSCOPY Right 2/13/2020    Procedure: URETEROSCOPY;  Surgeon: Moncho Rowell MD;  Location: [facility] OR 1ST FLR;  Service: Urology;  Laterality: Right;     Current Outpatient Medications on File Prior to Visit   Medication Sig Dispense Refill    acetaminophen (TYLENOL) 650 MG TbSR Take by mouth.      aspirin  (ECOTRIN) 81 MG EC tablet Take 81 mg by mouth once daily.      b complex vitamins capsule Take 1 capsule by mouth once daily.      buPROPion (WELLBUTRIN XL) 150 MG TB24 tablet Take 150 mg by mouth once daily.      calcium carbonate 1250 MG capsule Take 1,250 mg by mouth 2 (two) times daily with meals.      celecoxib (CELEBREX) 200 MG capsule TK 1 C PO QAM  1    cholecalciferol, vitamin D3, (VITAMIN D3) 10 mcg/mL (400 unit/mL) Drop Take by mouth.      cycloSPORINE (RESTASIS) 0.05 % ophthalmic emulsion 1 drop 2 (two) times daily.      denosumab (PROLIA) 60 mg/mL Syrg Inject 60 mg into the skin.      denosumab (PROLIA) 60 mg/mL Syrg Inject into the skin.      ergocalciferol (VITAMIN D2) 50,000 unit Cap Take 50,000 Units by mouth every 7 days.      gabapentin (NEURONTIN) 300 MG capsule Take 300 mg by mouth 3 (three) times daily.      HYDROcodone-acetaminophen (NORCO) 7.5-325 mg per tablet Take 1 tablet by mouth every 6 (six) hours as needed for Pain.      levothyroxine (SYNTHROID) 50 MCG tablet Take 50 mcg by mouth once daily.      lipase-protease-amylase 24,000-76,000-120,000 units (CREON) 24,000-76,000 -120,000 unit capsule Take 1 capsule by mouth 3 (three) times daily with meals. 270 capsule 3    multivitamin/iron/folic acid (CENTRUM COMPLETE ORAL) Take by mouth.      OLANZapine (ZYPREXA) 5 MG tablet Take 5 mg by mouth every evening.      ondansetron (ZOFRAN-ODT) 4 MG TbDL DISSOLVE 1 TABLET UNDER TONGUE EVERY 6 HOURS AS NEEDED FOR NAUSEA AND VOMITING  0    ondansetron (ZOFRAN-ODT) 8 MG TbDL Take 8 mg by mouth once.      oxyCODONE-acetaminophen (PERCOCET)  mg per tablet Take 1 tablet by mouth every 6 (six) hours as needed.  0    pantoprazole (PROTONIX) 40 MG tablet Take 40 mg by mouth once daily.      pantoprazole (PROTONIX) 40 MG tablet Take by mouth.      prochlorperazine (COMPAZINE) 10 MG tablet Take 10 mg by mouth every 6 (six) hours as needed.      sertraline (ZOLOFT) 25 MG tablet Take  "25 mg by mouth once daily.      tiZANidine (ZANAFLEX) 4 MG tablet Take 4 mg by mouth every 6 (six) hours as needed.      traMADol (ULTRAM) 50 mg tablet Take 50 mg by mouth every 6 (six) hours as needed for Pain.      TURMERIC ORAL Take by mouth.       No current facility-administered medications on file prior to visit.      Review of patient's allergies indicates:   Allergen Reactions    Azithromycin Other (See Comments)     Stomach pain     Codeine Hallucinations       Family History:  Her family history includes Cancer in her brother and paternal uncle; Diabetes (age of onset: 87) in her father.     Social History:   reports that she quit smoking about 50 years ago. She has never used smokeless tobacco. She reports that she drinks about 2.0 standard drinks of alcohol per week. She reports that she does not use drugs.     ROS:     Review of Systems   Constitutional: Positive for unexpected weight change (down 2#). Negative for activity change, appetite change and fever.   HENT: Negative for trouble swallowing.    Eyes: Negative for visual disturbance.   Respiratory: Negative for cough and shortness of breath.    Cardiovascular: Negative for chest pain and leg swelling.   Gastrointestinal: Negative for abdominal pain, diarrhea, nausea and vomiting.   Genitourinary: Negative for difficulty urinating.   Musculoskeletal: Positive for back pain (at R nephrostomy tube site). Negative for gait problem.   Skin: Negative for color change.   Neurological: Positive for weakness. Negative for light-headedness and numbness.   Psychiatric/Behavioral: Positive for sleep disturbance.     Pertinent positive/negatives detailed in HPI, all other systems negative.     Physical Exam:  /76 (BP Location: Right arm, Patient Position: Sitting, BP Method: Medium (Automatic))   Pulse 74   Temp 97.1 °F (36.2 °C) (Oral)   Ht 5' 2" (1.575 m)   Wt 46.1 kg (101 lb 10.1 oz)   BMI 18.59 kg/m²     Constitutional:  Non-toxic, no acute " distress.  Performance status:  ECO  Eyes:  Sclerae anicteric, gaze symmetrical  Neck:  Trachea midline,  FROM  Resp:  Easy work of breathing, no wheezes  CV:  Regular pulse, no JVD  Abd:  Soft, non-tender, no masses, no hepatosplenomegaly, no ascites  Lymphatics:  No cervical, supraclavicular lymphadenopathy  Musculoskeletal:  Ambulatory, normal gait, + muscle wasting.  Extremities are symmetrical without lymphedema.  Neuro:  No gross deficits  Psych:  Awake, alert, oriented.  Answers and asks questions appropriately      ICD-10-CM ICD-9-CM    1. Malignant neoplasm of head of pancreas C25.0 157.0 CT Abdomen Pelvis With Contrast      CT Abdomen Pelvis With Contrast      CANCELED: CT Abdomen Pelvis With Contrast   2. Severe protein-calorie malnutrition E43 262    3. Unintentional weight loss R63.4 783.21    4. Ureteral stricture, right N13.5 593.3    Plan   75 y/o female with head of panc CA, s/p neoadj chemotherapy 2 cycles FOLFOXIRI, 2 cycles of [place]/ Abraxane, last cycle 2020. CA 19-9 overall trended down but elevated again last month. Her tumor appears to be resectable but concern this R urinary stricture may represent metastatic disease. Discussed possible Whipple with vena cava reconstruction plus probable R nephrectomy. Reviewed alternatives to surgery, possible complications including dialysis, DGE, anastomatic leak.   Continue PREHAB - protein supplements, exercise with outpt PT  Retry PERT now that stent in place. She has Rx at home already. Reminded to take WITH meals.   Update PPCT scan A/P prior to surgery. May have scan locally.  Postpone OR date for Whipple from . Will coordinate OR for a date that Dr. Rowell is available.     Timed Up & Go (TUG) in seconds  2020    10.12 9.67 Trial 1   8.21 8.44 Trial 2    Strength in kilograms   2020    16.6 14.1 R   13.4 10.1 L     Questions were asked and answered to patient and 's satisfaction.      Pt seen  in conjunction with Dr. Wall today.         Keri Hall NP  Surgical Oncology  [facility] [place], [place]  Office: [phone]  Fax: [phone]       CC: Dr. Billy    ADDENDUM:   NM Renogram with Lasix 2/27/2020  1.  The right kidney is obstructed and shows severely decreased function relative to the left.  2.  The differential renal function is 87 % on the left and 13 % on the right.

## 2020-02-27 NOTE — PROGRESS NOTES
Pt and her  were approached in surg onc clinic regarding participation in IRB protocol #2015.101.C. Pt was agreeable.     The Informed Consent Form (ICF) was reviewed with pt. The discussion included:   - participation is voluntary  - pt can change her mind about participating  - pt was informed that participation in this study would not preclude her from participating in any other research if offered  - specimens may be used by [facility] researchers, community researchers or research companies  - specimens collected include only those discussed with the patient at the time of consent and are indicated on the ICF   - specimens may be used for DNA, RNA or protein studies investigating biomarkers for diagnostic, prognostic or treatment purposes  - blood specimen will be collected after routine collections have been conducted  - excess tumor tissue will be collected from Pathology after their approval  - participation in Biobank study will not change amount of tissue removed  - all specimens released to researchers will be stripped of identifiers  - no personal medical information will be released to any parties outside of this research study  - there will be no other physical risks outside of those involved in standard of care procedure     Dr. Wall approved of patient's participation in the Biobank study.  Pt did not have any questions. Pt willingly and independently signed ICF.    A copy of signed ICF was given to pt with instructions to call with any questions that may arise or if she should change her mind regarding participation in Biobank study.

## 2020-03-03 ENCOUNTER — PATIENT MESSAGE (OUTPATIENT)
Dept: SURGERY | Facility: CLINIC | Age: 75
End: 2020-03-03

## 2020-03-03 DIAGNOSIS — C25.0 MALIGNANT NEOPLASM OF HEAD OF PANCREAS: Primary | ICD-10-CM

## 2020-03-03 PROBLEM — K83.1 BILIARY OBSTRUCTION: Status: RESOLVED | Noted: 2020-02-14 | Resolved: 2020-03-03

## 2020-03-05 ENCOUNTER — TELEPHONE (OUTPATIENT)
Dept: SURGERY | Facility: CLINIC | Age: 75
End: 2020-03-05

## 2020-03-05 DIAGNOSIS — C25.0 MALIGNANT NEOPLASM OF HEAD OF PANCREAS: Primary | ICD-10-CM

## 2020-03-09 ENCOUNTER — TELEPHONE (OUTPATIENT)
Dept: SURGERY | Facility: CLINIC | Age: 75
End: 2020-03-09

## 2020-03-09 NOTE — TELEPHONE ENCOUNTER
"----- Message from Hannah Wilcox RN sent at 3/9/2020  1:37 PM CDT -----  Can you call her?  She left me a message that she needs to reschedule tomorrow's preop appt.    Uzma    I spoke with Julee who reports her Abdomen to be "very distended," "since Saturday." States the she is "unsteady and very very weak," "queasy," Julee mildly lethargic, rate pain at an "8" out of ten.  Denies vomiting, difficulties defecating, fevers/chill, falling, being jaundice(spouse verified). Recommended that she come in to be admitted but Mr. Hawkins reports the she wouldn't be able to travel that far. Urged them to proceed to their emergency room.  "

## 2020-03-10 ENCOUNTER — ANESTHESIA (OUTPATIENT)
Dept: SURGERY | Facility: HOSPITAL | Age: 75
DRG: 853 | End: 2020-03-10
Payer: COMMERCIAL

## 2020-03-10 ENCOUNTER — HOSPITAL ENCOUNTER (INPATIENT)
Facility: HOSPITAL | Age: 75
LOS: 16 days | Discharge: HOME HEALTH | DRG: 853 | End: 2020-03-26
Attending: SURGERY | Admitting: SURGERY
Payer: COMMERCIAL

## 2020-03-10 ENCOUNTER — ANESTHESIA EVENT (OUTPATIENT)
Dept: SURGERY | Facility: HOSPITAL | Age: 75
DRG: 853 | End: 2020-03-10
Payer: COMMERCIAL

## 2020-03-10 DIAGNOSIS — K66.8 PNEUMOPERITONEUM: Primary | ICD-10-CM

## 2020-03-10 DIAGNOSIS — K65.9 PERITONITIS: ICD-10-CM

## 2020-03-10 DIAGNOSIS — K83.8 PNEUMOBILIA: ICD-10-CM

## 2020-03-10 LAB
ABO + RH BLD: NORMAL
ALBUMIN SERPL BCP-MCNC: 1.5 G/DL (ref 3.5–5.2)
ALBUMIN SERPL BCP-MCNC: 1.9 G/DL (ref 3.5–5.2)
ALP SERPL-CCNC: 730 U/L (ref 55–135)
ALP SERPL-CCNC: 959 U/L (ref 55–135)
ALT SERPL W/O P-5'-P-CCNC: 44 U/L (ref 10–44)
ALT SERPL W/O P-5'-P-CCNC: 52 U/L (ref 10–44)
ANION GAP SERPL CALC-SCNC: 11 MMOL/L (ref 8–16)
ANION GAP SERPL CALC-SCNC: 13 MMOL/L (ref 8–16)
ANION GAP SERPL CALC-SCNC: 9 MMOL/L (ref 8–16)
ANISOCYTOSIS BLD QL SMEAR: SLIGHT
APTT BLDCRRT: 25 SEC (ref 21–32)
APTT BLDCRRT: 28.5 SEC (ref 21–32)
AST SERPL-CCNC: 47 U/L (ref 10–40)
AST SERPL-CCNC: 63 U/L (ref 10–40)
BASOPHILS # BLD AUTO: 0.03 K/UL (ref 0–0.2)
BASOPHILS # BLD AUTO: 0.05 K/UL (ref 0–0.2)
BASOPHILS NFR BLD: 0.2 % (ref 0–1.9)
BASOPHILS NFR BLD: 0.3 % (ref 0–1.9)
BILIRUB SERPL-MCNC: 2.7 MG/DL (ref 0.1–1)
BILIRUB SERPL-MCNC: 3.5 MG/DL (ref 0.1–1)
BILIRUB UR QL STRIP: NEGATIVE
BLD GP AB SCN CELLS X3 SERPL QL: NORMAL
BUN SERPL-MCNC: 23 MG/DL (ref 8–23)
BUN SERPL-MCNC: 24 MG/DL (ref 8–23)
BUN SERPL-MCNC: 37 MG/DL (ref 8–23)
CALCIUM SERPL-MCNC: 7.5 MG/DL (ref 8.7–10.5)
CALCIUM SERPL-MCNC: 8 MG/DL (ref 8.7–10.5)
CALCIUM SERPL-MCNC: 8.2 MG/DL (ref 8.7–10.5)
CHLORIDE SERPL-SCNC: 93 MMOL/L (ref 95–110)
CHLORIDE SERPL-SCNC: 97 MMOL/L (ref 95–110)
CHLORIDE SERPL-SCNC: 99 MMOL/L (ref 95–110)
CLARITY UR REFRACT.AUTO: CLEAR
CO2 SERPL-SCNC: 14 MMOL/L (ref 23–29)
CO2 SERPL-SCNC: 19 MMOL/L (ref 23–29)
CO2 SERPL-SCNC: 20 MMOL/L (ref 23–29)
COLOR UR AUTO: ABNORMAL
CREAT SERPL-MCNC: 0.7 MG/DL (ref 0.5–1.4)
CREAT SERPL-MCNC: 0.8 MG/DL (ref 0.5–1.4)
CREAT SERPL-MCNC: 0.9 MG/DL (ref 0.5–1.4)
DIFFERENTIAL METHOD: ABNORMAL
DIFFERENTIAL METHOD: ABNORMAL
DOHLE BOD BLD QL SMEAR: PRESENT
EOSINOPHIL # BLD AUTO: 0 K/UL (ref 0–0.5)
EOSINOPHIL # BLD AUTO: 0 K/UL (ref 0–0.5)
EOSINOPHIL NFR BLD: 0 % (ref 0–8)
EOSINOPHIL NFR BLD: 0.2 % (ref 0–8)
ERYTHROCYTE [DISTWIDTH] IN BLOOD BY AUTOMATED COUNT: 15.6 % (ref 11.5–14.5)
ERYTHROCYTE [DISTWIDTH] IN BLOOD BY AUTOMATED COUNT: 16.3 % (ref 11.5–14.5)
EST. GFR  (AFRICAN AMERICAN): >60 ML/MIN/1.73 M^2
EST. GFR  (NON AFRICAN AMERICAN): >60 ML/MIN/1.73 M^2
GLUCOSE SERPL-MCNC: 147 MG/DL (ref 70–110)
GLUCOSE SERPL-MCNC: 92 MG/DL (ref 70–110)
GLUCOSE SERPL-MCNC: 93 MG/DL (ref 70–110)
GLUCOSE UR QL STRIP: NEGATIVE
HCT VFR BLD AUTO: 25.1 % (ref 37–48.5)
HCT VFR BLD AUTO: 28.1 % (ref 37–48.5)
HGB BLD-MCNC: 8.4 G/DL (ref 12–16)
HGB BLD-MCNC: 9.1 G/DL (ref 12–16)
HGB UR QL STRIP: ABNORMAL
HYPOCHROMIA BLD QL SMEAR: ABNORMAL
IMM GRANULOCYTES # BLD AUTO: 0.19 K/UL (ref 0–0.04)
IMM GRANULOCYTES # BLD AUTO: 0.22 K/UL (ref 0–0.04)
IMM GRANULOCYTES NFR BLD AUTO: 1.1 % (ref 0–0.5)
IMM GRANULOCYTES NFR BLD AUTO: 1.4 % (ref 0–0.5)
INR PPP: 1.2 (ref 0.8–1.2)
INR PPP: 1.2 (ref 0.8–1.2)
KETONES UR QL STRIP: NEGATIVE
LACTATE SERPL-SCNC: 0.6 MMOL/L (ref 0.5–2.2)
LEUKOCYTE ESTERASE UR QL STRIP: NEGATIVE
LIPASE SERPL-CCNC: 35 U/L (ref 4–60)
LYMPHOCYTES # BLD AUTO: 0.4 K/UL (ref 1–4.8)
LYMPHOCYTES # BLD AUTO: 0.5 K/UL (ref 1–4.8)
LYMPHOCYTES NFR BLD: 2.6 % (ref 18–48)
LYMPHOCYTES NFR BLD: 2.7 % (ref 18–48)
MAGNESIUM SERPL-MCNC: 1.4 MG/DL (ref 1.6–2.6)
MAGNESIUM SERPL-MCNC: 1.6 MG/DL (ref 1.6–2.6)
MAGNESIUM SERPL-MCNC: 3.1 MG/DL (ref 1.6–2.6)
MCH RBC QN AUTO: 31 PG (ref 27–31)
MCH RBC QN AUTO: 31.1 PG (ref 27–31)
MCHC RBC AUTO-ENTMCNC: 32.4 G/DL (ref 32–36)
MCHC RBC AUTO-ENTMCNC: 33.5 G/DL (ref 32–36)
MCV RBC AUTO: 93 FL (ref 82–98)
MCV RBC AUTO: 96 FL (ref 82–98)
MICROSCOPIC COMMENT: NORMAL
MONOCYTES # BLD AUTO: 0.5 K/UL (ref 0.3–1)
MONOCYTES # BLD AUTO: 0.8 K/UL (ref 0.3–1)
MONOCYTES NFR BLD: 3.3 % (ref 4–15)
MONOCYTES NFR BLD: 4.9 % (ref 4–15)
NEUTROPHILS # BLD AUTO: 14.9 K/UL (ref 1.8–7.7)
NEUTROPHILS # BLD AUTO: 15.7 K/UL (ref 1.8–7.7)
NEUTROPHILS NFR BLD: 91.2 % (ref 38–73)
NEUTROPHILS NFR BLD: 92.1 % (ref 38–73)
NITRITE UR QL STRIP: NEGATIVE
NRBC BLD-RTO: 0 /100 WBC
NRBC BLD-RTO: 0 /100 WBC
PH UR STRIP: 5 [PH] (ref 5–8)
PHOSPHATE SERPL-MCNC: 2.1 MG/DL (ref 2.7–4.5)
PHOSPHATE SERPL-MCNC: 2.1 MG/DL (ref 2.7–4.5)
PHOSPHATE SERPL-MCNC: 5 MG/DL (ref 2.7–4.5)
PLATELET # BLD AUTO: 183 K/UL (ref 150–350)
PLATELET # BLD AUTO: 195 K/UL (ref 150–350)
PLATELET BLD QL SMEAR: ABNORMAL
PMV BLD AUTO: 10.2 FL (ref 9.2–12.9)
PMV BLD AUTO: 10.9 FL (ref 9.2–12.9)
POCT GLUCOSE: 116 MG/DL (ref 70–110)
POCT GLUCOSE: 139 MG/DL (ref 70–110)
POCT GLUCOSE: 75 MG/DL (ref 70–110)
POCT GLUCOSE: 87 MG/DL (ref 70–110)
POTASSIUM SERPL-SCNC: 3.3 MMOL/L (ref 3.5–5.1)
POTASSIUM SERPL-SCNC: 3.9 MMOL/L (ref 3.5–5.1)
POTASSIUM SERPL-SCNC: 4.1 MMOL/L (ref 3.5–5.1)
PROT SERPL-MCNC: 4.8 G/DL (ref 6–8.4)
PROT SERPL-MCNC: 5.8 G/DL (ref 6–8.4)
PROT UR QL STRIP: NEGATIVE
PROTHROMBIN TIME: 12.2 SEC (ref 9–12.5)
PROTHROMBIN TIME: 12.3 SEC (ref 9–12.5)
RBC # BLD AUTO: 2.7 M/UL (ref 4–5.4)
RBC # BLD AUTO: 2.94 M/UL (ref 4–5.4)
RBC #/AREA URNS AUTO: 1 /HPF (ref 0–4)
SODIUM SERPL-SCNC: 124 MMOL/L (ref 136–145)
SODIUM SERPL-SCNC: 124 MMOL/L (ref 136–145)
SODIUM SERPL-SCNC: 127 MMOL/L (ref 136–145)
SP GR UR STRIP: 1.03 (ref 1–1.03)
SQUAMOUS #/AREA URNS AUTO: 0 /HPF
URN SPEC COLLECT METH UR: ABNORMAL
WBC # BLD AUTO: 16.14 K/UL (ref 3.9–12.7)
WBC # BLD AUTO: 17.25 K/UL (ref 3.9–12.7)
WBC #/AREA URNS AUTO: 1 /HPF (ref 0–5)

## 2020-03-10 PROCEDURE — 99291 PR CRITICAL CARE, E/M 30-74 MINUTES: ICD-10-PCS | Mod: ,,, | Performed by: EMERGENCY MEDICINE

## 2020-03-10 PROCEDURE — 63600175 PHARM REV CODE 636 W HCPCS: Performed by: STUDENT IN AN ORGANIZED HEALTH CARE EDUCATION/TRAINING PROGRAM

## 2020-03-10 PROCEDURE — 43235 EGD DIAGNOSTIC BRUSH WASH: CPT | Mod: 51,,, | Performed by: SURGERY

## 2020-03-10 PROCEDURE — 99291 CRITICAL CARE FIRST HOUR: CPT | Mod: ,,, | Performed by: EMERGENCY MEDICINE

## 2020-03-10 PROCEDURE — 99233 SBSQ HOSP IP/OBS HIGH 50: CPT | Mod: 24,,, | Performed by: SURGERY

## 2020-03-10 PROCEDURE — 85025 COMPLETE CBC W/AUTO DIFF WBC: CPT

## 2020-03-10 PROCEDURE — 88305 TISSUE EXAM BY PATHOLOGIST: CPT | Mod: 59 | Performed by: PATHOLOGY

## 2020-03-10 PROCEDURE — 86920 COMPATIBILITY TEST SPIN: CPT

## 2020-03-10 PROCEDURE — 63600175 PHARM REV CODE 636 W HCPCS: Performed by: SURGERY

## 2020-03-10 PROCEDURE — D9220A PRA ANESTHESIA: Mod: ,,, | Performed by: ANESTHESIOLOGY

## 2020-03-10 PROCEDURE — 47001 PR NEEDLE BIOPSY LIVER,W OTHR PROC: ICD-10-PCS | Mod: ,,, | Performed by: SURGERY

## 2020-03-10 PROCEDURE — 49999 PR BIOPSY, PERITONEUM, OPEN: ICD-10-PCS | Mod: ,,, | Performed by: SURGERY

## 2020-03-10 PROCEDURE — 88342 CHG IMMUNOCYTOCHEMISTRY: ICD-10-PCS | Mod: 26,,, | Performed by: PATHOLOGY

## 2020-03-10 PROCEDURE — 84100 ASSAY OF PHOSPHORUS: CPT

## 2020-03-10 PROCEDURE — 88305 TISSUE EXAM BY PATHOLOGIST: CPT | Mod: 26,,, | Performed by: PATHOLOGY

## 2020-03-10 PROCEDURE — 88342 IMHCHEM/IMCYTCHM 1ST ANTB: CPT | Mod: 26,,, | Performed by: PATHOLOGY

## 2020-03-10 PROCEDURE — 36000707: Performed by: SURGERY

## 2020-03-10 PROCEDURE — 86901 BLOOD TYPING SEROLOGIC RH(D): CPT

## 2020-03-10 PROCEDURE — 83735 ASSAY OF MAGNESIUM: CPT | Mod: 91

## 2020-03-10 PROCEDURE — 83735 ASSAY OF MAGNESIUM: CPT

## 2020-03-10 PROCEDURE — 49999 UNLISTED PX ABD PERTM&OMN: CPT | Mod: ,,, | Performed by: SURGERY

## 2020-03-10 PROCEDURE — 99900035 HC TECH TIME PER 15 MIN (STAT)

## 2020-03-10 PROCEDURE — 99291 CRITICAL CARE FIRST HOUR: CPT | Mod: 25

## 2020-03-10 PROCEDURE — 87040 BLOOD CULTURE FOR BACTERIA: CPT | Mod: 59

## 2020-03-10 PROCEDURE — 83605 ASSAY OF LACTIC ACID: CPT

## 2020-03-10 PROCEDURE — 44602 PR SUTURE SM INTEST,SINGLE PERF: ICD-10-PCS | Mod: 59,,, | Performed by: SURGERY

## 2020-03-10 PROCEDURE — 27800903 OPTIME MED/SURG SUP & DEVICES OTHER IMPLANTS: Performed by: SURGERY

## 2020-03-10 PROCEDURE — 37000008 HC ANESTHESIA 1ST 15 MINUTES: Performed by: SURGERY

## 2020-03-10 PROCEDURE — 27000221 HC OXYGEN, UP TO 24 HOURS

## 2020-03-10 PROCEDURE — C9113 INJ PANTOPRAZOLE SODIUM, VIA: HCPCS | Performed by: STUDENT IN AN ORGANIZED HEALTH CARE EDUCATION/TRAINING PROGRAM

## 2020-03-10 PROCEDURE — 94761 N-INVAS EAR/PLS OXIMETRY MLT: CPT

## 2020-03-10 PROCEDURE — 85730 THROMBOPLASTIN TIME PARTIAL: CPT | Mod: 91

## 2020-03-10 PROCEDURE — 37000009 HC ANESTHESIA EA ADD 15 MINS: Performed by: SURGERY

## 2020-03-10 PROCEDURE — 94770 HC EXHALED C02 TEST: CPT

## 2020-03-10 PROCEDURE — 99222 1ST HOSP IP/OBS MODERATE 55: CPT | Mod: ,,, | Performed by: SURGERY

## 2020-03-10 PROCEDURE — 84100 ASSAY OF PHOSPHORUS: CPT | Mod: 91

## 2020-03-10 PROCEDURE — 88341 PR IHC OR ICC EACH ADD'L SINGLE ANTIBODY  STAINPR: ICD-10-PCS | Mod: 26,,, | Performed by: PATHOLOGY

## 2020-03-10 PROCEDURE — 97605 PR NEG PRESS WOUND THERAPY (NPWT) W/NON-DISPOSABLE WOUND VAC DEVICE (DME), <=50 CM: ICD-10-PCS | Mod: ,,, | Performed by: SURGERY

## 2020-03-10 PROCEDURE — 88341 IMHCHEM/IMCYTCHM EA ADD ANTB: CPT | Mod: 59 | Performed by: PATHOLOGY

## 2020-03-10 PROCEDURE — 25000003 PHARM REV CODE 250: Performed by: ANESTHESIOLOGY

## 2020-03-10 PROCEDURE — 80053 COMPREHEN METABOLIC PANEL: CPT

## 2020-03-10 PROCEDURE — 49905 PR OMENTAL FLAP,INTRA-ABDOMINAL: ICD-10-PCS | Mod: ,,, | Performed by: SURGERY

## 2020-03-10 PROCEDURE — 25000003 PHARM REV CODE 250: Performed by: STUDENT IN AN ORGANIZED HEALTH CARE EDUCATION/TRAINING PROGRAM

## 2020-03-10 PROCEDURE — 99233 PR SUBSEQUENT HOSPITAL CARE,LEVL III: ICD-10-PCS | Mod: 24,,, | Performed by: SURGERY

## 2020-03-10 PROCEDURE — 51702 INSERT TEMP BLADDER CATH: CPT

## 2020-03-10 PROCEDURE — 85730 THROMBOPLASTIN TIME PARTIAL: CPT

## 2020-03-10 PROCEDURE — 85610 PROTHROMBIN TIME: CPT | Mod: 91

## 2020-03-10 PROCEDURE — 80053 COMPREHEN METABOLIC PANEL: CPT | Mod: 91

## 2020-03-10 PROCEDURE — 83690 ASSAY OF LIPASE: CPT

## 2020-03-10 PROCEDURE — 49905 OMENTAL FLAP INTRA-ABDOM: CPT | Mod: ,,, | Performed by: SURGERY

## 2020-03-10 PROCEDURE — 43235 PR EGD, FLEX, DIAGNOSTIC: ICD-10-PCS | Mod: 51,,, | Performed by: SURGERY

## 2020-03-10 PROCEDURE — 47001 NDL BIOPSY LVR TM OTH MAJ PX: CPT | Mod: ,,, | Performed by: SURGERY

## 2020-03-10 PROCEDURE — 99222 PR INITIAL HOSPITAL CARE,LEVL II: ICD-10-PCS | Mod: ,,, | Performed by: SURGERY

## 2020-03-10 PROCEDURE — D9220A PRA ANESTHESIA: ICD-10-PCS | Mod: ,,, | Performed by: ANESTHESIOLOGY

## 2020-03-10 PROCEDURE — 36000706: Performed by: SURGERY

## 2020-03-10 PROCEDURE — 20000000 HC ICU ROOM

## 2020-03-10 PROCEDURE — 85610 PROTHROMBIN TIME: CPT

## 2020-03-10 PROCEDURE — 88360 TUMOR IMMUNOHISTOCHEM/MANUAL: CPT | Performed by: PATHOLOGY

## 2020-03-10 PROCEDURE — 88342 IMHCHEM/IMCYTCHM 1ST ANTB: CPT | Performed by: PATHOLOGY

## 2020-03-10 PROCEDURE — 44602 SUTURE SMALL INTESTINE: CPT | Mod: 59,,, | Performed by: SURGERY

## 2020-03-10 PROCEDURE — 80048 BASIC METABOLIC PNL TOTAL CA: CPT

## 2020-03-10 PROCEDURE — 81001 URINALYSIS AUTO W/SCOPE: CPT

## 2020-03-10 PROCEDURE — 88341 IMHCHEM/IMCYTCHM EA ADD ANTB: CPT | Mod: 26,,, | Performed by: PATHOLOGY

## 2020-03-10 PROCEDURE — 97605 NEG PRS WND THER DME<=50SQCM: CPT | Mod: ,,, | Performed by: SURGERY

## 2020-03-10 PROCEDURE — 88305 TISSUE EXAM BY PATHOLOGIST: ICD-10-PCS | Mod: 26,,, | Performed by: PATHOLOGY

## 2020-03-10 PROCEDURE — 63600175 PHARM REV CODE 636 W HCPCS: Performed by: ANESTHESIOLOGY

## 2020-03-10 PROCEDURE — 63600175 PHARM REV CODE 636 W HCPCS: Performed by: GENERAL PRACTICE

## 2020-03-10 RX ORDER — GLUCAGON 1 MG
1 KIT INJECTION
Status: DISCONTINUED | OUTPATIENT
Start: 2020-03-10 | End: 2020-03-19

## 2020-03-10 RX ORDER — SODIUM CHLORIDE 9 MG/ML
INJECTION, SOLUTION INTRAVENOUS CONTINUOUS
Status: DISCONTINUED | OUTPATIENT
Start: 2020-03-10 | End: 2020-03-12

## 2020-03-10 RX ORDER — METHOCARBAMOL 500 MG/1
500 TABLET, FILM COATED ORAL 4 TIMES DAILY
Status: ON HOLD | COMMUNITY
End: 2020-03-18 | Stop reason: HOSPADM

## 2020-03-10 RX ORDER — LIDOCAINE HYDROCHLORIDE 20 MG/ML
INJECTION, SOLUTION EPIDURAL; INFILTRATION; INTRACAUDAL; PERINEURAL
Status: DISCONTINUED | OUTPATIENT
Start: 2020-03-10 | End: 2020-03-10

## 2020-03-10 RX ORDER — SODIUM CHLORIDE 0.9 % (FLUSH) 0.9 %
10 SYRINGE (ML) INJECTION
Status: DISCONTINUED | OUTPATIENT
Start: 2020-03-10 | End: 2020-03-26 | Stop reason: HOSPADM

## 2020-03-10 RX ORDER — HYDROMORPHONE HYDROCHLORIDE 1 MG/ML
0.5 INJECTION, SOLUTION INTRAMUSCULAR; INTRAVENOUS; SUBCUTANEOUS ONCE
Status: COMPLETED | OUTPATIENT
Start: 2020-03-10 | End: 2020-03-10

## 2020-03-10 RX ORDER — PHENYLEPHRINE HCL IN 0.9% NACL 1 MG/10 ML
SYRINGE (ML) INTRAVENOUS
Status: DISCONTINUED | OUTPATIENT
Start: 2020-03-10 | End: 2020-03-10

## 2020-03-10 RX ORDER — MAGNESIUM SULFATE HEPTAHYDRATE 40 MG/ML
2 INJECTION, SOLUTION INTRAVENOUS
Status: DISCONTINUED | OUTPATIENT
Start: 2020-03-10 | End: 2020-03-12

## 2020-03-10 RX ORDER — PANTOPRAZOLE SODIUM 40 MG/10ML
40 INJECTION, POWDER, LYOPHILIZED, FOR SOLUTION INTRAVENOUS 2 TIMES DAILY
Status: DISCONTINUED | OUTPATIENT
Start: 2020-03-10 | End: 2020-03-17

## 2020-03-10 RX ORDER — HYDROMORPHONE HYDROCHLORIDE 1 MG/ML
0.5 INJECTION, SOLUTION INTRAMUSCULAR; INTRAVENOUS; SUBCUTANEOUS EVERY 4 HOURS PRN
Status: DISCONTINUED | OUTPATIENT
Start: 2020-03-10 | End: 2020-03-10

## 2020-03-10 RX ORDER — ROCURONIUM BROMIDE 10 MG/ML
INJECTION, SOLUTION INTRAVENOUS
Status: DISCONTINUED | OUTPATIENT
Start: 2020-03-10 | End: 2020-03-10

## 2020-03-10 RX ORDER — PROPOFOL 10 MG/ML
VIAL (ML) INTRAVENOUS
Status: DISCONTINUED | OUTPATIENT
Start: 2020-03-10 | End: 2020-03-10

## 2020-03-10 RX ORDER — INSULIN ASPART 100 [IU]/ML
0-5 INJECTION, SOLUTION INTRAVENOUS; SUBCUTANEOUS EVERY 6 HOURS PRN
Status: DISCONTINUED | OUTPATIENT
Start: 2020-03-10 | End: 2020-03-19

## 2020-03-10 RX ORDER — GLYCOPYRROLATE 0.2 MG/ML
INJECTION INTRAMUSCULAR; INTRAVENOUS
Status: DISCONTINUED | OUTPATIENT
Start: 2020-03-10 | End: 2020-03-10

## 2020-03-10 RX ORDER — HYDROMORPHONE HCL IN 0.9% NACL 6 MG/30 ML
PATIENT CONTROLLED ANALGESIA SYRINGE INTRAVENOUS CONTINUOUS
Status: DISCONTINUED | OUTPATIENT
Start: 2020-03-10 | End: 2020-03-16

## 2020-03-10 RX ORDER — FLUCONAZOLE 2 MG/ML
200 INJECTION, SOLUTION INTRAVENOUS
Status: DISCONTINUED | OUTPATIENT
Start: 2020-03-10 | End: 2020-03-14

## 2020-03-10 RX ORDER — NEOSTIGMINE METHYLSULFATE 0.5 MG/ML
INJECTION, SOLUTION INTRAVENOUS
Status: DISCONTINUED | OUTPATIENT
Start: 2020-03-10 | End: 2020-03-10

## 2020-03-10 RX ORDER — POTASSIUM CHLORIDE 7.45 MG/ML
60 INJECTION INTRAVENOUS
Status: DISCONTINUED | OUTPATIENT
Start: 2020-03-10 | End: 2020-03-12

## 2020-03-10 RX ORDER — VANCOMYCIN HCL IN 5 % DEXTROSE 1G/250ML
1000 PLASTIC BAG, INJECTION (ML) INTRAVENOUS ONCE
Status: COMPLETED | OUTPATIENT
Start: 2020-03-10 | End: 2020-03-10

## 2020-03-10 RX ORDER — ENOXAPARIN SODIUM 100 MG/ML
40 INJECTION SUBCUTANEOUS EVERY 24 HOURS
Status: DISCONTINUED | OUTPATIENT
Start: 2020-03-10 | End: 2020-03-26 | Stop reason: HOSPADM

## 2020-03-10 RX ORDER — FENTANYL CITRATE 50 UG/ML
INJECTION, SOLUTION INTRAMUSCULAR; INTRAVENOUS
Status: DISCONTINUED | OUTPATIENT
Start: 2020-03-10 | End: 2020-03-10

## 2020-03-10 RX ORDER — MAGNESIUM SULFATE HEPTAHYDRATE 40 MG/ML
4 INJECTION, SOLUTION INTRAVENOUS
Status: DISCONTINUED | OUTPATIENT
Start: 2020-03-10 | End: 2020-03-12

## 2020-03-10 RX ORDER — DEXAMETHASONE SODIUM PHOSPHATE 4 MG/ML
INJECTION, SOLUTION INTRA-ARTICULAR; INTRALESIONAL; INTRAMUSCULAR; INTRAVENOUS; SOFT TISSUE
Status: DISCONTINUED | OUTPATIENT
Start: 2020-03-10 | End: 2020-03-10

## 2020-03-10 RX ORDER — ONDANSETRON 2 MG/ML
4 INJECTION INTRAMUSCULAR; INTRAVENOUS EVERY 12 HOURS PRN
Status: DISCONTINUED | OUTPATIENT
Start: 2020-03-10 | End: 2020-03-19

## 2020-03-10 RX ORDER — HYDROMORPHONE HYDROCHLORIDE 1 MG/ML
1 INJECTION, SOLUTION INTRAMUSCULAR; INTRAVENOUS; SUBCUTANEOUS EVERY 4 HOURS PRN
Status: DISCONTINUED | OUTPATIENT
Start: 2020-03-10 | End: 2020-03-10

## 2020-03-10 RX ORDER — POTASSIUM CHLORIDE 7.45 MG/ML
80 INJECTION INTRAVENOUS
Status: DISCONTINUED | OUTPATIENT
Start: 2020-03-10 | End: 2020-03-12

## 2020-03-10 RX ORDER — SUCCINYLCHOLINE CHLORIDE 20 MG/ML
INJECTION INTRAMUSCULAR; INTRAVENOUS
Status: DISCONTINUED | OUTPATIENT
Start: 2020-03-10 | End: 2020-03-10

## 2020-03-10 RX ORDER — NALOXONE HCL 0.4 MG/ML
0.02 VIAL (ML) INJECTION
Status: DISCONTINUED | OUTPATIENT
Start: 2020-03-10 | End: 2020-03-16

## 2020-03-10 RX ORDER — DEXMEDETOMIDINE HYDROCHLORIDE 100 UG/ML
INJECTION, SOLUTION INTRAVENOUS
Status: DISCONTINUED | OUTPATIENT
Start: 2020-03-10 | End: 2020-03-10

## 2020-03-10 RX ORDER — SODIUM CHLORIDE, SODIUM LACTATE, POTASSIUM CHLORIDE, CALCIUM CHLORIDE 600; 310; 30; 20 MG/100ML; MG/100ML; MG/100ML; MG/100ML
INJECTION, SOLUTION INTRAVENOUS CONTINUOUS
Status: DISCONTINUED | OUTPATIENT
Start: 2020-03-10 | End: 2020-03-10

## 2020-03-10 RX ORDER — POTASSIUM CHLORIDE 7.45 MG/ML
40 INJECTION INTRAVENOUS
Status: DISCONTINUED | OUTPATIENT
Start: 2020-03-10 | End: 2020-03-12

## 2020-03-10 RX ORDER — MIDAZOLAM HYDROCHLORIDE 1 MG/ML
INJECTION, SOLUTION INTRAMUSCULAR; INTRAVENOUS
Status: DISCONTINUED | OUTPATIENT
Start: 2020-03-10 | End: 2020-03-10

## 2020-03-10 RX ADMIN — DEXAMETHASONE SODIUM PHOSPHATE 8 MG: 4 INJECTION, SOLUTION INTRA-ARTICULAR; INTRALESIONAL; INTRAMUSCULAR; INTRAVENOUS; SOFT TISSUE at 09:03

## 2020-03-10 RX ADMIN — HYDROMORPHONE HYDROCHLORIDE 1 MG: 1 INJECTION, SOLUTION INTRAMUSCULAR; INTRAVENOUS; SUBCUTANEOUS at 08:03

## 2020-03-10 RX ADMIN — Medication 200 MCG: at 09:03

## 2020-03-10 RX ADMIN — PIPERACILLIN SODIUM AND TAZOBACTAM SODIUM 4.5 G: 4; .5 INJECTION, POWDER, LYOPHILIZED, FOR SOLUTION INTRAVENOUS at 07:03

## 2020-03-10 RX ADMIN — SUCCINYLCHOLINE CHLORIDE 100 MG: 20 INJECTION, SOLUTION INTRAMUSCULAR; INTRAVENOUS at 09:03

## 2020-03-10 RX ADMIN — PANTOPRAZOLE SODIUM 40 MG: 40 INJECTION, POWDER, FOR SOLUTION INTRAVENOUS at 05:03

## 2020-03-10 RX ADMIN — SODIUM CHLORIDE 1500 ML: 0.9 INJECTION, SOLUTION INTRAVENOUS at 06:03

## 2020-03-10 RX ADMIN — PANTOPRAZOLE SODIUM 40 MG: 40 INJECTION, POWDER, FOR SOLUTION INTRAVENOUS at 09:03

## 2020-03-10 RX ADMIN — ENOXAPARIN SODIUM 40 MG: 100 INJECTION SUBCUTANEOUS at 04:03

## 2020-03-10 RX ADMIN — PIPERACILLIN SODIUM AND TAZOBACTAM SODIUM 4.5 G: 4; .5 INJECTION, POWDER, LYOPHILIZED, FOR SOLUTION INTRAVENOUS at 03:03

## 2020-03-10 RX ADMIN — LIDOCAINE HYDROCHLORIDE 100 MG: 20 INJECTION, SOLUTION EPIDURAL; INFILTRATION; INTRACAUDAL; PERINEURAL at 09:03

## 2020-03-10 RX ADMIN — HYDROMORPHONE HYDROCHLORIDE 0.5 MG: 1 INJECTION, SOLUTION INTRAMUSCULAR; INTRAVENOUS; SUBCUTANEOUS at 03:03

## 2020-03-10 RX ADMIN — VANCOMYCIN HYDROCHLORIDE 1000 MG: 1 INJECTION, POWDER, LYOPHILIZED, FOR SOLUTION INTRAVENOUS at 08:03

## 2020-03-10 RX ADMIN — SODIUM CHLORIDE, SODIUM LACTATE, POTASSIUM CHLORIDE, AND CALCIUM CHLORIDE: .6; .31; .03; .02 INJECTION, SOLUTION INTRAVENOUS at 07:03

## 2020-03-10 RX ADMIN — SODIUM CHLORIDE, SODIUM GLUCONATE, SODIUM ACETATE, POTASSIUM CHLORIDE, MAGNESIUM CHLORIDE, SODIUM PHOSPHATE, DIBASIC, AND POTASSIUM PHOSPHATE: .53; .5; .37; .037; .03; .012; .00082 INJECTION, SOLUTION INTRAVENOUS at 09:03

## 2020-03-10 RX ADMIN — DEXMEDETOMIDINE HYDROCHLORIDE 4 MCG: 100 INJECTION, SOLUTION INTRAVENOUS at 10:03

## 2020-03-10 RX ADMIN — NEOSTIGMINE METHYLSULFATE 3 MG: 0.5 INJECTION INTRAVENOUS at 11:03

## 2020-03-10 RX ADMIN — Medication 100 MCG: at 10:03

## 2020-03-10 RX ADMIN — SODIUM PHOSPHATE, MONOBASIC, MONOHYDRATE 20.01 MMOL: 276; 142 INJECTION, SOLUTION INTRAVENOUS at 04:03

## 2020-03-10 RX ADMIN — MAGNESIUM SULFATE HEPTAHYDRATE 4 G: 40 INJECTION, SOLUTION INTRAVENOUS at 03:03

## 2020-03-10 RX ADMIN — HYDROMORPHONE HYDROCHLORIDE 1 MG: 1 INJECTION, SOLUTION INTRAMUSCULAR; INTRAVENOUS; SUBCUTANEOUS at 12:03

## 2020-03-10 RX ADMIN — Medication 100 MCG: at 09:03

## 2020-03-10 RX ADMIN — ROCURONIUM BROMIDE 5 MG: 10 INJECTION, SOLUTION INTRAVENOUS at 09:03

## 2020-03-10 RX ADMIN — POTASSIUM CHLORIDE 40 MEQ: 7.46 INJECTION, SOLUTION INTRAVENOUS at 02:03

## 2020-03-10 RX ADMIN — Medication: at 03:03

## 2020-03-10 RX ADMIN — MIDAZOLAM HYDROCHLORIDE 2 MG: 1 INJECTION, SOLUTION INTRAMUSCULAR; INTRAVENOUS at 09:03

## 2020-03-10 RX ADMIN — Medication 200 MCG: at 10:03

## 2020-03-10 RX ADMIN — SODIUM CHLORIDE: 0.9 INJECTION, SOLUTION INTRAVENOUS at 07:03

## 2020-03-10 RX ADMIN — FENTANYL CITRATE 25 MCG: 50 INJECTION INTRAMUSCULAR; INTRAVENOUS at 11:03

## 2020-03-10 RX ADMIN — PROPOFOL 120 MG: 10 INJECTION, EMULSION INTRAVENOUS at 09:03

## 2020-03-10 RX ADMIN — FLUCONAZOLE 200 MG: 2 INJECTION, SOLUTION INTRAVENOUS at 07:03

## 2020-03-10 RX ADMIN — GLYCOPYRROLATE 0.4 MG: 0.2 INJECTION, SOLUTION INTRAMUSCULAR; INTRAVENOUS at 11:03

## 2020-03-10 RX ADMIN — ONDANSETRON 4 MG: 2 INJECTION INTRAMUSCULAR; INTRAVENOUS at 10:03

## 2020-03-10 RX ADMIN — ROCURONIUM BROMIDE 15 MG: 10 INJECTION, SOLUTION INTRAVENOUS at 09:03

## 2020-03-10 NOTE — BRIEF OP NOTE
[facility]  Brief Operative Note    SUMMARY     Surgery Date: 3/10/2020     Surgeon(s) and Role:     * Quang Wall MD - Primary     * Quincy Barry MD - Resident - Assisting     * Dejuan Foy MD - Resident - Assisting     * Stefano Robledo MD - Resident - Assisting    Pre-op Diagnosis:  Pneumoperitoneum [K66.8]    Post-op Diagnosis:  Post-Op Diagnosis Codes:     * Pneumoperitoneum [K66.8]    Procedure(s) (LRB):  LAPAROTOMY, EXPLORATORY (N/A)  CLOSURE, ULCER, PERFORATED, DUODENUM, USING OMENTAL PATCH  BIOPSY, LIVER  INSERTION, GASTROSTOMY TUBE    Anesthesia: General    Description of the findings of the procedure: Approx 2 mm perforation the lateral aspect of the proximal duodenum overlying the indwelling duodenal stent. EGD. Closed primarily with omental patch. Liver nodule in segment III. Excisional biopsy. Peritoneal nodule x 2 sent as well. Open Jaime gastrostomy tube. Incisional wound VAC.    Estimated Blood Loss: 35 mL    Total IV Fluids: Per Anesthesia         Specimens:   Liver nodule  Peritoneal nodule x 2

## 2020-03-10 NOTE — ED TRIAGE NOTES
Pt came from [facility] abdominal pain on the right side and fatigue. Pt denies any fever, nausea or vomiting. Pt has pancreatic cancer. Pt has not urinated or had a BM in 3 days.

## 2020-03-10 NOTE — H&P
[facility]  Critical Care - Surgery  History & Physical    Patient Name: Julee Hawkins  MRN: [id number]  Admission Date: 3/10/2020  Code Status: Full Code  Attending Physician: Quang Wall MD   Primary Care Provider: Shalom Cerna MD   Principal Problem: Pneumoperitoneum    Subjective:     HPI:  Julee Hawkins is a 74 y.o. female presents as transfer to [facility] with right sided abdominal pain and fatigue. Pt denies any fever, nausea or vomiting. 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  Pt has not urinated or had a BM in 3 days at time of presentation.  Pt has been having abdominal pain since Saturday.  Transferred after CT findings. CT scan was remarkable for pneumoperitoneum and pneumobilia Reportedly had been responding to neoadjuvant chemo based on last PET.  She is scheduled to undergo a Whipple procedure 3/16/20. Labs remarkable for a leukocytosis and elevated bilirubin. The patient was given antibiotics and transferred here. Pt seen by general surgery in in ED.   Pt arrives to SICU no supplemental O2, HDS, no pressors. Pain well controlled at this time.     Hospital/ICU Course:  No notes on file         Past Medical History:   Diagnosis Date    Arthritis     Biliary stricture     Cancer     Breast cancer    Cholangitis     Dilated bile duct     Duodenal stenosis     Jaundice     Pancreas cyst     Pancreatic mass     Protein calorie malnutrition     Thyroid disease     Ureteral stricture, right     Vitamin D deficiency     Weight loss, unintentional        Past Surgical History:   Procedure Laterality Date    ANTEGRADE NEPHROSTOGRAPHY Right 2/13/2020    Procedure: Nephrostogram - antegrade;  Surgeon: Moncho Rowell MD;  Location: [facility] OR [address] [place] [address];  Service: Urology;  Laterality: Right;    APPENDECTOMY      BACK  SURGERY      BREAST SURGERY      lumpectomy    CHOLECYSTECTOMY      CYSTOSCOPY W/ RETROGRADES Right 2020    Procedure: CYSTOSCOPY, WITH RETROGRADE PYELOGRAM;  Surgeon: Moncho Rowell MD;  Location: [facility] OR 1ST FLR;  Service: Urology;  Laterality: Right;  1hr    ENDOSCOPIC ULTRASOUND OF UPPER GASTROINTESTINAL TRACT N/A 2019    Procedure: ULTRASOUND, UPPER GI TRACT, ENDOSCOPIC;  Surgeon: Jhonathan Abdul MD;  Location: [facility] ENDO (2ND FLR);  Service: Endoscopy;  Laterality: N/A;    ERCP N/A 2019    Procedure: ERCP (ENDOSCOPIC RETROGRADE CHOLANGIOPANCREATOGRAPHY);  Surgeon: Jhonathan Abdul MD;  Location: [facility] ENDO (2ND FLR);  Service: Endoscopy;  Laterality: N/A;    ERCP N/A 2020    Procedure: ERCP (ENDOSCOPIC RETROGRADE CHOLANGIOPANCREATOGRAPHY);  Surgeon: Coleman Merritt MD;  Location: [facility] ENDO (2ND FLR);  Service: Endoscopy;  Laterality: N/A;    HYSTERECTOMY      rectovaginal fistula repair      before , from BRCA chemotherapy    SBR Right 10/09/2019    with open repair of R femoral hernia     SPINE SURGERY      URETEROSCOPY Right 2020    Procedure: URETEROSCOPY;  Surgeon: Moncho Rowell MD;  Location: [facility] OR 1ST FLR;  Service: Urology;  Laterality: Right;       Review of patient's allergies indicates:   Allergen Reactions    Azithromycin Other (See Comments)     Stomach pain     Codeine Hallucinations       Family History     Problem Relation (Age of Onset)    Cancer Paternal Uncle, Brother    Diabetes Father (87)        Tobacco Use    Smoking status: Former Smoker     Last attempt to quit: 1969     Years since quittin.7    Smokeless tobacco: Never Used   Substance and Sexual Activity    Alcohol use: Yes     Alcohol/week: 2.0 standard drinks     Types: 2 Glasses of wine per week     Comment: 2 glasses of red wine/ night . Not currently     Drug use: Never    Sexual activity: Not on file      Review of Systems   Constitutional: Positive for  appetite change, chills and fatigue. Negative for fever.   HENT: Positive for sore throat. Negative for ear discharge, ear pain, sinus pressure, sinus pain and sneezing.    Eyes: Negative for pain, discharge, redness and itching.   Respiratory: Negative for cough, chest tightness, shortness of breath, wheezing and stridor.    Cardiovascular: Negative for chest pain, palpitations and leg swelling.   Gastrointestinal: Positive for abdominal distention and abdominal pain. Negative for blood in stool, constipation, diarrhea, nausea and vomiting.   Genitourinary: Negative for difficulty urinating, dysuria and hematuria.   Musculoskeletal: Negative for gait problem, joint swelling and myalgias.   Skin: Positive for color change and pallor. Negative for rash and wound.   Neurological: Negative for dizziness, seizures, numbness and headaches.   Psychiatric/Behavioral: Negative for agitation, confusion, hallucinations and suicidal ideas.     Objective:     Vital Signs (Most Recent):  Temp: 98.6 °F (37 °C) (03/10/20 0715)  Pulse: 87 (03/10/20 0715)  Resp: 20 (03/10/20 0715)  BP: 107/64 (03/10/20 0715)  SpO2: 100 % (03/10/20 0715) Vital Signs (24h Range):  Temp:  [98.2 °F (36.8 °C)-98.6 °F (37 °C)] 98.6 °F (37 °C)  Pulse:  [80-98] 87  Resp:  [16-25] 20  SpO2:  [96 %-100 %] 100 %  BP: (101-112)/(56-68) 107/64     Weight: 46.3 kg (102 lb)  Body mass index is 18.66 kg/m².      Intake/Output Summary (Last 24 hours) at 3/10/2020 0736  Last data filed at 3/10/2020 0715  Gross per 24 hour   Intake --   Output 910 ml   Net -910 ml       Physical Exam   Constitutional: She is oriented to person, place, and time. She appears well-developed. No distress.   HENT:   Head: Normocephalic and atraumatic.   Nose: Nose normal.   Eyes: Pupils are equal, round, and reactive to light. EOM are normal.   Icteric    Neck: Normal range of motion. Neck supple.   Cardiovascular: Normal rate, regular rhythm, normal heart sounds and intact distal pulses.    Pulmonary/Chest: Effort normal and breath sounds normal.   Abdominal: She exhibits distension. There is tenderness. There is guarding.   RLQ tenderness  R nephrostomy tube   Genitourinary:   Genitourinary Comments: Harmon in place   Neurological: She is alert and oriented to person, place, and time.   Skin: Skin is warm and dry.   Psychiatric: She has a normal mood and affect. Her behavior is normal. Judgment and thought content normal.       Vents: No Supp O2       Lines/Drains/Airways     Drain                 Nephrostomy Right -- days         Urethral Catheter 03/10/20 0529 Straight-tip less than 1 day          Peripheral Intravenous Line                 Peripheral IV - Single Lumen 20 G Left Wrist -- days         Peripheral IV - Single Lumen 03/10/20 0647 20 G Right Antecubital less than 1 day                Significant Labs:    CBC/Anemia Profile:  Recent Labs   Lab 03/10/20  0340   WBC 17.25*   HGB 8.4*   HCT 25.1*      MCV 93   RDW 15.6*        Chemistries:  Recent Labs   Lab 03/10/20  0340   *   K 3.3*   CL 93*   CO2 20*   BUN 37*   CREATININE 0.9   CALCIUM 8.0*   ALBUMIN 1.9*   PROT 5.8*   BILITOT 3.5*   ALKPHOS 959*   ALT 52*   AST 47*   MG 1.6   PHOS 2.1*       Bilirubin:   Recent Labs   Lab 03/10/20  0340   BILITOT 3.5*     Blood Culture: No results for input(s): LABBLOO in the last 48 hours.  Coagulation:   Recent Labs   Lab 03/10/20  0340   INR 1.2   APTT 28.5     Lactic Acid:   Recent Labs   Lab 03/10/20  0340   LACTATE 0.6     Urine Studies:   Recent Labs   Lab 03/10/20  0440   COLORU Naz   APPEARANCEUA Clear   PHUR 5.0   SPECGRAV 1.030   PROTEINUA Negative   GLUCUA Negative   KETONESU Negative   BILIRUBINUA Negative   OCCULTUA 1+*   NITRITE Negative   LEUKOCYTESUR Negative   RBCUA 1   WBCUA 1   SQUAMEPITHEL 0       Significant Imaging: I have reviewed all pertinent imaging results/findings within the past 24 hours.    Assessment/Plan:     * Pneumoperitoneum  Julee Hawkins is a  74 y.o. female PMH pancreatic adenocarcinoma now with pneumoperitoneum.     Neuro:  Sedation: None  Pain control: Diluadid    Resp:  O2 sats stable on RA  No distress    CV:  HDS  No pressor requirement  No A line    Heme/ID:  H/H, trending  WBC 17.25  Vanc, Zosyn, fluconazle     Renal:  Harmon in place  Strict I/Os  BUN Cr    FEN/GI:  NPO  Replace lytes PRN  Protonix BID    Endo:  SSI    PPX:  Protonix  Lovenox    Dispo: Continue ICU care, to OR today          Critical care was time spent personally by me on the following activities: development of treatment plan with patient or surrogate and bedside caregivers, discussions with consultants, evaluation of patient's response to treatment, examination of patient, ordering and performing treatments and interventions, ordering and review of laboratory studies, ordering and review of radiographic studies, pulse oximetry, re-evaluation of patient's condition.  This critical care time did not overlap with that of any other provider or involve time for any procedures.     Moncho Clemons MD  Critical Care - Surgery  [facility]

## 2020-03-10 NOTE — ED PROVIDER NOTES
Encounter Date: 3/9/2020       History     Chief Complaint   Patient presents with    Transfer     From [place]. Pancreatic cancer, recent surgery. Has pneumoperitoneum. Gen Surg consult      74-year-old female transferred to our facility for general surgery evaluation.  The patient has a past medical history of pancreatic cancer.  She is scheduled to undergo a Whipple procedure.  She presented to the outside facility with abdominal pain and jaundice skin.  She was found to have peritoneal signs on physical exam.  Labs remarkable for a leukocytosis and elevated bilirubin.  CT scan was remarkable for pneumoperitoneum and pneumobilia.  The patient was given antibiotics and transferred here.  Upon arrival to our facility she appears pale and cachectic.  She is jaundiced also.  Her abdomen is very tender and peritoneal.        Review of patient's allergies indicates:   Allergen Reactions    Azithromycin Other (See Comments)     Stomach pain     Codeine Hallucinations     Past Medical History:   Diagnosis Date    Arthritis     Biliary stricture     Cancer     Breast cancer    Cholangitis     Dilated bile duct     Duodenal stenosis     Jaundice     Pancreas cyst     Pancreatic mass     Protein calorie malnutrition     Thyroid disease     Ureteral stricture, right     Vitamin D deficiency     Weight loss, unintentional      Past Surgical History:   Procedure Laterality Date    ANTEGRADE NEPHROSTOGRAPHY Right 2/13/2020    Procedure: Nephrostogram - antegrade;  Surgeon: Moncho Rowell MD;  Location: [facility] OR [address] [place] [address];  Service: Urology;  Laterality: Right;    APPENDECTOMY      BACK SURGERY      BREAST SURGERY      lumpectomy    CHOLECYSTECTOMY      CYSTOSCOPY W/ RETROGRADES Right 2/13/2020    Procedure: CYSTOSCOPY, WITH RETROGRADE PYELOGRAM;  Surgeon: Moncho Rowell MD;  Location: [facility] OR [address] [place] [address];  Service: Urology;  Laterality: Right;  1hr    ENDOSCOPIC ULTRASOUND OF UPPER GASTROINTESTINAL TRACT  N/A 2019    Procedure: ULTRASOUND, UPPER GI TRACT, ENDOSCOPIC;  Surgeon: Jhonathan Abdul MD;  Location: [facility] ENDO (2ND FLR);  Service: Endoscopy;  Laterality: N/A;    ERCP N/A 2019    Procedure: ERCP (ENDOSCOPIC RETROGRADE CHOLANGIOPANCREATOGRAPHY);  Surgeon: Jhonathan Abdul MD;  Location: [facility] ENDO (2ND FLR);  Service: Endoscopy;  Laterality: N/A;    ERCP N/A 2020    Procedure: ERCP (ENDOSCOPIC RETROGRADE CHOLANGIOPANCREATOGRAPHY);  Surgeon: Coleman Merritt MD;  Location: [facility] ENDO (2ND FLR);  Service: Endoscopy;  Laterality: N/A;    HYSTERECTOMY      rectovaginal fistula repair      before , from BRCA chemotherapy    SBR Right 10/09/2019    with open repair of R femoral hernia     SPINE SURGERY      URETEROSCOPY Right 2020    Procedure: URETEROSCOPY;  Surgeon: Moncho Rowell MD;  Location: [facility] OR 1ST FLR;  Service: Urology;  Laterality: Right;     Family History   Problem Relation Age of Onset    Cancer Paternal Uncle         colon    Diabetes Father 87        T2DM, late onset    Cancer Brother         non Hogkin's     Social History     Tobacco Use    Smoking status: Former Smoker     Last attempt to quit: 1969     Years since quittin.7    Smokeless tobacco: Never Used   Substance Use Topics    Alcohol use: Yes     Alcohol/week: 2.0 standard drinks     Types: 2 Glasses of wine per week     Comment: 2 glasses of red wine/ night . Not currently     Drug use: Never     Review of Systems   Constitutional: Positive for chills and fatigue. Negative for fever.   HENT: Negative for sore throat.    Respiratory: Negative for shortness of breath.    Cardiovascular: Negative for chest pain.   Gastrointestinal: Positive for abdominal distention, abdominal pain and nausea.   Genitourinary: Negative for dysuria.   Musculoskeletal: Negative for back pain.   Skin: Negative for rash.   Neurological: Negative for weakness.   Hematological: Does not bruise/bleed easily.        Physical Exam     Initial Vitals [03/10/20 0257]   BP Pulse Resp Temp SpO2   (!) 102/56 98 16 98.3 °F (36.8 °C) 96 %      MAP       --         Physical Exam    Constitutional: Vital signs are normal. She appears well-developed and well-nourished. She is not diaphoretic. No distress.   HENT:   Head: Normocephalic and atraumatic.   Right Ear: Hearing and external ear normal.   Left Ear: Hearing and external ear normal.   Eyes: Conjunctivae are normal.   Cardiovascular: Normal rate and regular rhythm.   Pulmonary/Chest:   Clear bilaterally   Abdominal: Normal appearance and bowel sounds are normal. There is tenderness. There is rebound and guarding.   Musculoskeletal: Normal range of motion.   Neurological: She is alert and oriented to person, place, and time.   Skin: Skin is warm and intact.   Psychiatric: She has a normal mood and affect. Her speech is normal and behavior is normal. Cognition and memory are normal.         ED Course   Procedures  Labs Reviewed   CBC W/ AUTO DIFFERENTIAL - Abnormal; Notable for the following components:       Result Value    WBC 17.25 (*)     RBC 2.70 (*)     Hemoglobin 8.4 (*)     Hematocrit 25.1 (*)     Mean Corpuscular Hemoglobin 31.1 (*)     RDW 15.6 (*)     Immature Granulocytes 1.1 (*)     Gran # (ANC) 15.7 (*)     Immature Grans (Abs) 0.19 (*)     Lymph # 0.5 (*)     Gran% 91.2 (*)     Lymph% 2.6 (*)     All other components within normal limits   COMPREHENSIVE METABOLIC PANEL - Abnormal; Notable for the following components:    Sodium 124 (*)     Potassium 3.3 (*)     Chloride 93 (*)     CO2 20 (*)     BUN, Bld 37 (*)     Calcium 8.0 (*)     Total Protein 5.8 (*)     Albumin 1.9 (*)     Total Bilirubin 3.5 (*)     Alkaline Phosphatase 959 (*)     AST 47 (*)     ALT 52 (*)     All other components within normal limits   URINALYSIS, REFLEX TO URINE CULTURE - Abnormal; Notable for the following components:    Occult Blood UA 1+ (*)     All other components within normal  limits    Narrative:     YELLOW AND GRAY TOP TUBES   PHOSPHORUS - Abnormal; Notable for the following components:    Phosphorus 2.1 (*)     All other components within normal limits   CULTURE, BLOOD   CULTURE, BLOOD   PROTIME-INR   APTT   LACTIC ACID, PLASMA   LIPASE   MAGNESIUM   URINALYSIS MICROSCOPIC    Narrative:     YELLOW AND GRAY TOP TUBES   TYPE & SCREEN          Imaging Results    None          Medical Decision Making:   Initial Assessment:     74-year-old female with a peritoneal abdomen  Differential Diagnosis:    Small bowel perforation, large bowel perforation, stent migration, bowel ischemia, sepsis  ED Management:   74-year-old female with pneumoperitoneum,  Pneumobilia,  And leukocytosis transferred for general surgery consultation.    CT scan performed at outside facility.  Images on a disc transferred to our facility.  I discussed the case with General surgery.  They will come see the patient.  The patient has already got fluids and antibiotics at the outside facility.  General Surgery will admit to SICU, Pt requesting to hold off on all surgeries @ this time. Awaiting the arrival of her   Other:   I discussed test(s) with the performing physician.  I have discussed this case with another health care provider.              Attending Attestation:     Physician Attestation Statement for NP/PA:   I have conducted a face to face encounter with this patient in addition to the NP/PA, due to Medical Complexity    Other NP/PA Attestation Additions:      Medical Decision Making: This 74-year-old woman with a history of pancreatic cancer, who is currently planning to undergo Whipple procedure, who presented to an outside hospital with severe abdominal pain, and a CT scan was notable for pneumoperitoneum.  She was given Zosyn and Flagyl and transferred here for further evaluation.  On my exam she is peritoneal, with significant guarding and rigidity on the right side of her abdomen greater than her  left.  We emergently discussed with General surgery, who saw and evaluated the patient, and recommended operative management.  At this point she has declined operative intervention, and prefers to wait for her  to join her at bedside so they can discuss this together.  Her  was called on the phone, and the patient still prefers to wait.  She will be admitted to the SICU for further management of her pneumoperitoneum and presumed perforated bowel.     Attending Critical Care:   Critical Care Times:   Direct Patient Care (initial evaluation, reassessments, and time considering the case)................................................................15 minutes.   Additional History from reviewing old medical records or taking additional history from the family, EMS, PCP, etc.......................5 minutes.   Ordering, Reviewing, and Interpreting Diagnostic Studies...............................................................................................................5 minutes.   Documentation..................................................................................................................................................................................5 minutes.   Consultation with other Physicians. .................................................................................................................................................5 minutes.   ==============================================================  · Total Critical Care Time - exclusive of procedural time: 35 minutes.  ==============================================================  Critical care was necessary to treat or prevent imminent or life-threatening deterioration of the following conditions: perforated viscous.   The following critical care procedures were done by me (see procedure notes): pulse oximetry.   Critical care was time spent personally by me on the following activities: obtaining history from  patient or relative, examination of patient, review of x-rays / CT sent with the patient, ordering lab, x-rays, and/or EKG, development of treatment plan with patient or relative, ordering and performing treatments and interventions, evaluation of patient's response to treatment and discussion with consultants.   Critical Care Condition: critical                             Clinical Impression:       ICD-10-CM ICD-9-CM   1. Pneumoperitoneum K66.8 568.89   2. Pneumobilia K83.8 576.8   3. Peritonitis K65.9 567.9         Disposition:   Disposition: Admitted  Condition: Stable     ED Disposition Condition    Admit                           Roberto Johnson PA-C  03/10/20 0456       Liliana Herrera MD  03/10/20 0544

## 2020-03-10 NOTE — ANESTHESIA POSTPROCEDURE EVALUATION
Anesthesia Post Evaluation    Patient: Julee Hawkins    Procedure(s) Performed: Procedure(s) (LRB):  LAPAROTOMY, EXPLORATORY (N/A)  CLOSURE, ULCER, PERFORATED, DUODENUM, USING OMENTAL PATCH  BIOPSY, LIVER  INSERTION, PEG TUBE    Final Anesthesia Type: general    Patient location during evaluation: ICU  Patient participation: Yes- Able to Participate  Level of consciousness: awake  Post-procedure vital signs: reviewed and stable  Pain management: adequate  Airway patency: patent    PONV status at discharge: No PONV  Anesthetic complications: no      Cardiovascular status: hemodynamically stable  Respiratory status: spontaneous ventilation  Hydration status: euvolemic  Follow-up not needed.          Vitals Value Taken Time   /63 3/10/2020 12:17 PM   Temp 36.4 °C (97.5 °F) 3/10/2020 11:30 AM   Pulse 74 3/10/2020 12:23 PM   Resp 13 3/10/2020 12:23 PM   SpO2 99 % 3/10/2020 12:23 PM   Vitals shown include unvalidated device data.      No case tracking events are documented in the log.      Pain/Ana Cristina Score: Pain Rating Prior to Med Admin: 10 (3/10/2020 12:19 PM)  Pain Rating Post Med Admin: 5 (3/10/2020  8:30 AM)

## 2020-03-10 NOTE — PROGRESS NOTES
SURGICAL ONCOLOGY PROGRESS NOTE    See prior documentation by Dr. Wolff for full details. Lengthy discussion with patient again this morning regarding her CT imaging findings and the implications. Discussed that this is a surgical emergency and that she and her  (via phone) need to discuss her goals of care in light of her locally advanced pancreatic cancer. She has elected to proceed with emergent exploration to identify site of hollow viscus perforation. Plan for class A exploratory laparotomy. Risks, benefits, alternatives to the procedure discussed with the patient who wishes to proceed. All questions and concerns addressed. Informed consent obtained. Anesthesia and OR desk notified.      Dejuan Foy MD  Surgery Resident, PGY-V  Pager: [phone]  3/10/2020 8:42 AM

## 2020-03-10 NOTE — TRANSFER OF CARE
Anesthesia Transfer of Care Note    Patient: Julee Hawkins    Procedure(s) Performed: Procedure(s) (LRB):  LAPAROTOMY, EXPLORATORY (N/A)  CLOSURE, ULCER, PERFORATED, DUODENUM, USING OMENTAL PATCH  BIOPSY, LIVER  INSERTION, PEG TUBE    Patient location: ICU    Anesthesia Type: general    Transport from OR: Transported from OR on 6-10 L/min O2 by face mask with adequate spontaneous ventilation    Post pain: adequate analgesia    Post assessment: no apparent anesthetic complications    Post vital signs: stable    Level of consciousness: awake    Nausea/Vomiting: no nausea/vomiting    Complications: none    Transfer of care protocol was followed      Last vitals:   Visit Vitals  /65   Pulse 84   Temp 37 °C (98.6 °F) (Oral)   Resp (!) 27   Wt 46.3 kg (102 lb)   SpO2 100%   Breastfeeding? No   BMI 18.66 kg/m²

## 2020-03-10 NOTE — ASSESSMENT & PLAN NOTE
Julee Hawkins is a 74 y.o. female PMH pancreatic adenocarcinoma now with pneumoperitoneum.     Neuro:  Sedation: None  Pain control: Diluadid    Resp:  O2 sats stable on RA  No distress    CV:  HDS  No pressor requirement  No A line    Heme/ID:  H/H, trending  WBC 17.25  Vanc, Zosyn, fluconazle     Renal:  Harmon in place  Strict I/Os  BUN Cr    FEN/GI:  NPO  Replace lytes PRN  Protonix BID    Endo:  SSI    PPX:  Protonix  Lovenox    Dispo: Continue ICU care, to OR today

## 2020-03-10 NOTE — SUBJECTIVE & OBJECTIVE
Past Medical History:   Diagnosis Date    Arthritis     Biliary stricture     Cancer     Breast cancer    Cholangitis     Dilated bile duct     Duodenal stenosis     Jaundice     Pancreas cyst     Pancreatic mass     Protein calorie malnutrition     Thyroid disease     Ureteral stricture, right     Vitamin D deficiency     Weight loss, unintentional        Past Surgical History:   Procedure Laterality Date    ANTEGRADE NEPHROSTOGRAPHY Right 2/13/2020    Procedure: Nephrostogram - antegrade;  Surgeon: Moncho Rowell MD;  Location: [facility] OR [address] [place] [address];  Service: Urology;  Laterality: Right;    APPENDECTOMY      BACK SURGERY      BREAST SURGERY      lumpectomy    CHOLECYSTECTOMY      CYSTOSCOPY W/ RETROGRADES Right 2/13/2020    Procedure: CYSTOSCOPY, WITH RETROGRADE PYELOGRAM;  Surgeon: Moncho Rowell MD;  Location: [facility] OR 1ST FLR;  Service: Urology;  Laterality: Right;  1hr    ENDOSCOPIC ULTRASOUND OF UPPER GASTROINTESTINAL TRACT N/A 6/13/2019    Procedure: ULTRASOUND, UPPER GI TRACT, ENDOSCOPIC;  Surgeon: Jhonathan Abdul MD;  Location: [facility] (2ND FLR);  Service: Endoscopy;  Laterality: N/A;    ERCP N/A 6/13/2019    Procedure: ERCP (ENDOSCOPIC RETROGRADE CHOLANGIOPANCREATOGRAPHY);  Surgeon: Jhonathan Abdul MD;  Location: [facility] ENDO (2ND FLR);  Service: Endoscopy;  Laterality: N/A;    ERCP N/A 2/14/2020    Procedure: ERCP (ENDOSCOPIC RETROGRADE CHOLANGIOPANCREATOGRAPHY);  Surgeon: Coleman Merritt MD;  Location: [facility] ENDO (2ND FLR);  Service: Endoscopy;  Laterality: N/A;    HYSTERECTOMY      rectovaginal fistula repair      before 2010, from BRCA chemotherapy    SBR Right 10/09/2019    with open repair of R femoral hernia     SPINE SURGERY      URETEROSCOPY Right 2/13/2020    Procedure: URETEROSCOPY;  Surgeon: Moncho Rowell MD;  [facility];  Service: Urology;  Laterality: Right;       Review of patient's allergies indicates:   Allergen Reactions     Azithromycin Other (See Comments)     Stomach pain     Codeine Hallucinations       Family History     Problem Relation (Age of Onset)    Cancer Paternal Uncle, Brother    Diabetes Father (87)        Tobacco Use    Smoking status: Former Smoker     Last attempt to quit: 1969     Years since quittin.7    Smokeless tobacco: Never Used   Substance and Sexual Activity    Alcohol use: Yes     Alcohol/week: 2.0 standard drinks     Types: 2 Glasses of wine per week     Comment: 2 glasses of red wine/ night . Not currently     Drug use: Never    Sexual activity: Not on file      Review of Systems   Constitutional: Positive for appetite change, chills and fatigue. Negative for fever.   HENT: Positive for sore throat. Negative for ear discharge, ear pain, sinus pressure, sinus pain and sneezing.    Eyes: Negative for pain, discharge, redness and itching.   Respiratory: Negative for cough, chest tightness, shortness of breath, wheezing and stridor.    Cardiovascular: Negative for chest pain, palpitations and leg swelling.   Gastrointestinal: Positive for abdominal distention and abdominal pain. Negative for blood in stool, constipation, diarrhea, nausea and vomiting.   Genitourinary: Negative for difficulty urinating, dysuria and hematuria.   Musculoskeletal: Negative for gait problem, joint swelling and myalgias.   Skin: Positive for color change and pallor. Negative for rash and wound.   Neurological: Negative for dizziness, seizures, numbness and headaches.   Psychiatric/Behavioral: Negative for agitation, confusion, hallucinations and suicidal ideas.     Objective:     Vital Signs (Most Recent):  Temp: 98.6 °F (37 °C) (03/10/20 0715)  Pulse: 87 (03/10/20 0715)  Resp: 20 (03/10/20 0715)  BP: 107/64 (03/10/20 0715)  SpO2: 100 % (03/10/20 0715) Vital Signs (24h Range):  Temp:  [98.2 °F (36.8 °C)-98.6 °F (37 °C)] 98.6 °F (37 °C)  Pulse:  [80-98] 87  Resp:  [16-25] 20  SpO2:  [96 %-100 %] 100 %  BP:  (101-112)/(56-68) 107/64     Weight: 46.3 kg (102 lb)  Body mass index is 18.66 kg/m².      Intake/Output Summary (Last 24 hours) at 3/10/2020 0736  Last data filed at 3/10/2020 0715  Gross per 24 hour   Intake --   Output 910 ml   Net -910 ml       Physical Exam   Constitutional: She is oriented to person, place, and time. She appears well-developed. No distress.   HENT:   Head: Normocephalic and atraumatic.   Nose: Nose normal.   Eyes: Pupils are equal, round, and reactive to light. EOM are normal.   Icteric    Neck: Normal range of motion. Neck supple.   Cardiovascular: Normal rate, regular rhythm, normal heart sounds and intact distal pulses.   Pulmonary/Chest: Effort normal and breath sounds normal.   Abdominal: She exhibits distension. There is tenderness. There is guarding.   RLQ tenderness  R nephrostomy tube   Genitourinary:   Genitourinary Comments: Harmon in place   Neurological: She is alert and oriented to person, place, and time.   Skin: Skin is warm and dry.   Psychiatric: She has a normal mood and affect. Her behavior is normal. Judgment and thought content normal.       Vents: No Supp O2       Lines/Drains/Airways     Drain                 Nephrostomy Right -- days         Urethral Catheter 03/10/20 0529 Straight-tip less than 1 day          Peripheral Intravenous Line                 Peripheral IV - Single Lumen 20 G Left Wrist -- days         Peripheral IV - Single Lumen 03/10/20 0647 20 G Right Antecubital less than 1 day                Significant Labs:    CBC/Anemia Profile:  Recent Labs   Lab 03/10/20  0340   WBC 17.25*   HGB 8.4*   HCT 25.1*      MCV 93   RDW 15.6*        Chemistries:  Recent Labs   Lab 03/10/20  0340   *   K 3.3*   CL 93*   CO2 20*   BUN 37*   CREATININE 0.9   CALCIUM 8.0*   ALBUMIN 1.9*   PROT 5.8*   BILITOT 3.5*   ALKPHOS 959*   ALT 52*   AST 47*   MG 1.6   PHOS 2.1*       Bilirubin:   Recent Labs   Lab 03/10/20  0340   BILITOT 3.5*     Blood Culture: No  results for input(s): LABBLOO in the last 48 hours.  Coagulation:   Recent Labs   Lab 03/10/20  0340   INR 1.2   APTT 28.5     Lactic Acid:   Recent Labs   Lab 03/10/20  0340   LACTATE 0.6     Urine Studies:   Recent Labs   Lab 03/10/20  0440   COLORU Naz   APPEARANCEUA Clear   PHUR 5.0   SPECGRAV 1.030   PROTEINUA Negative   GLUCUA Negative   KETONESU Negative   BILIRUBINUA Negative   OCCULTUA 1+*   NITRITE Negative   LEUKOCYTESUR Negative   RBCUA 1   WBCUA 1   SQUAMEPITHEL 0       Significant Imaging: I have reviewed all pertinent imaging results/findings within the past 24 hours.

## 2020-03-10 NOTE — CONSULTS
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  Pt has been having abdominal pain since Saturday.  Transferred after CT findings.  Reportedly had been responding to neoadjuvant chemo based on last PET    No current facility-administered medications on file prior to encounter.      Current Outpatient Medications on File Prior to Encounter   Medication Sig    buPROPion (WELLBUTRIN XL) 150 MG TB24 tablet Take 150 mg by mouth once daily.    gabapentin (NEURONTIN) 300 MG capsule Take 300 mg by mouth 3 (three) times daily.    levothyroxine (SYNTHROID) 50 MCG tablet Take 50 mcg by mouth once daily.    methocarbamoL (ROBAXIN) 500 MG Tab Take 500 mg by mouth 4 (four) times daily.    OLANZapine (ZYPREXA) 5 MG tablet Take 5 mg by mouth every evening.    ondansetron (ZOFRAN-ODT) 4 MG TbDL DISSOLVE 1 TABLET UNDER TONGUE EVERY 6 HOURS AS NEEDED FOR NAUSEA AND VOMITING    pantoprazole (PROTONIX) 40 MG tablet Take 40 mg by mouth once daily.    prochlorperazine (COMPAZINE) 10 MG tablet Take 10 mg by mouth every 6 (six) hours as needed.    acetaminophen (TYLENOL) 650 MG TbSR Take 1,000 mg by mouth 3 (three) times daily.     aspirin (ECOTRIN) 81 MG EC tablet Take 81 mg by mouth once daily.    b complex vitamins capsule Take 1 capsule by mouth once daily.    calcium carbonate 1250 MG capsule Take 1,250 mg by mouth 2 (two) times daily with meals.    celecoxib (CELEBREX) 200 MG capsule TK 1 C PO QAM    cholecalciferol, vitamin D3, (VITAMIN D3) 10 mcg/mL (400 unit/mL) Drop Take by mouth.    cycloSPORINE (RESTASIS) 0.05 % ophthalmic emulsion 1 drop 2 (two) times daily.    denosumab (PROLIA) 60  mg/mL Syrg Inject 60 mg into the skin.    denosumab (PROLIA) 60 mg/mL Syrg Inject into the skin.    ergocalciferol (VITAMIN D2) 50,000 unit Cap Take 8,000 Units by mouth every 7 days.     HYDROcodone-acetaminophen (NORCO) 7.5-325 mg per tablet Take 1 tablet by mouth every 6 (six) hours as needed for Pain.    lipase-protease-amylase 24,000-76,000-120,000 units (CREON) 24,000-76,000 -120,000 unit capsule Take 1 capsule by mouth 3 (three) times daily with meals.    multivitamin/iron/folic acid (CENTRUM COMPLETE ORAL) Take by mouth.    ondansetron (ZOFRAN-ODT) 8 MG TbDL Take 8 mg by mouth once.    oxyCODONE-acetaminophen (PERCOCET)  mg per tablet Take 1 tablet by mouth every 6 (six) hours as needed.    pantoprazole (PROTONIX) 40 MG tablet Take by mouth.    sertraline (ZOLOFT) 25 MG tablet Take 25 mg by mouth once daily.    tiZANidine (ZANAFLEX) 4 MG tablet Take 4 mg by mouth every 6 (six) hours as needed.    traMADol (ULTRAM) 50 mg tablet Take 50 mg by mouth every 6 (six) hours as needed for Pain.    TURMERIC ORAL Take by mouth.       Review of patient's allergies indicates:   Allergen Reactions    Azithromycin Other (See Comments)     Stomach pain     Codeine Hallucinations       Past Medical History:   Diagnosis Date    Arthritis     Biliary stricture     Cancer     Breast cancer    Cholangitis     Dilated bile duct     Duodenal stenosis     Jaundice     Pancreas cyst     Pancreatic mass     Protein calorie malnutrition     Thyroid disease     Ureteral stricture, right     Vitamin D deficiency     Weight loss, unintentional      Past Surgical History:   Procedure Laterality Date    ANTEGRADE NEPHROSTOGRAPHY Right 2/13/2020    Procedure: Nephrostogram - antegrade;  Surgeon: Moncho Rowell MD;  Location: [facility] OR [address] [place] [address];  Service: Urology;  Laterality: Right;    APPENDECTOMY      BACK SURGERY      BREAST SURGERY      lumpectomy    CHOLECYSTECTOMY      CYSTOSCOPY W/ RETROGRADES  Right 2020    Procedure: CYSTOSCOPY, WITH RETROGRADE PYELOGRAM;  Surgeon: Moncho Rowell MD;  Location: [facility] OR 1ST FLR;  Service: Urology;  Laterality: Right;  1hr    ENDOSCOPIC ULTRASOUND OF UPPER GASTROINTESTINAL TRACT N/A 2019    Procedure: ULTRASOUND, UPPER GI TRACT, ENDOSCOPIC;  Surgeon: Jhonathan Abdul MD;  Location: [facility] ENDO (2ND FLR);  Service: Endoscopy;  Laterality: N/A;    ERCP N/A 2019    Procedure: ERCP (ENDOSCOPIC RETROGRADE CHOLANGIOPANCREATOGRAPHY);  Surgeon: Jhonathan Abdul MD;  Location: [facility] ENDO (2ND FLR);  Service: Endoscopy;  Laterality: N/A;    ERCP N/A 2020    Procedure: ERCP (ENDOSCOPIC RETROGRADE CHOLANGIOPANCREATOGRAPHY);  Surgeon: Coleman Merritt MD;  Location: [facility] ENDO (2ND FLR);  Service: Endoscopy;  Laterality: N/A;    HYSTERECTOMY      rectovaginal fistula repair      before , from BRCA chemotherapy    SBR Right 10/09/2019    with open repair of R femoral hernia     SPINE SURGERY      URETEROSCOPY Right 2020    Procedure: URETEROSCOPY;  Surgeon: Moncho Rowell MD;  Location: [facility] OR 1ST FLR;  Service: Urology;  Laterality: Right;     Family History     Problem Relation (Age of Onset)    Cancer Paternal Uncle, Brother    Diabetes Father (87)        Tobacco Use    Smoking status: Former Smoker     Last attempt to quit: 1969     Years since quittin.7    Smokeless tobacco: Never Used   Substance and Sexual Activity    Alcohol use: Yes     Alcohol/week: 2.0 standard drinks     Types: 2 Glasses of wine per week     Comment: 2 glasses of red wine/ night . Not currently     Drug use: Never    Sexual activity: Not on file     Review of Systems   Constitutional: Positive for fatigue.   HENT: Negative.    Eyes: Negative.    Respiratory: Negative.    Cardiovascular: Negative.    Gastrointestinal: Positive for abdominal pain.   Endocrine: Negative.    Genitourinary: Positive for difficulty urinating.   Musculoskeletal:  Negative.    Skin: Positive for color change.   Allergic/Immunologic: Negative.    Neurological: Negative.    Hematological: Negative.    Psychiatric/Behavioral: Positive for confusion.     Objective:     Vital Signs (Most Recent):  Temp: 98.3 °F (36.8 °C) (03/10/20 0257)  Pulse: 98 (03/10/20 0257)  Resp: 16 (03/10/20 0257)  BP: (!) 102/56 (03/10/20 0257)  SpO2: 96 % (03/10/20 0257) Vital Signs (24h Range):  Temp:  [98.2 °F (36.8 °C)-98.3 °F (36.8 °C)] 98.3 °F (36.8 °C)  Pulse:  [80-98] 98  Resp:  [16-18] 16  SpO2:  [96 %-100 %] 96 %  BP: (102-109)/(56-68) 102/56     Weight: 46.3 kg (102 lb)  Body mass index is 18.66 kg/m².    No intake or output data in the 24 hours ending 03/10/20 0511    Physical Exam   Constitutional: She appears well-developed. No distress.   HENT:   Head: Normocephalic.   Eyes: Scleral icterus is present.   Neck: Normal range of motion.   Cardiovascular: Normal rate and regular rhythm.   Pulmonary/Chest: Effort normal.   Abdominal: There is tenderness. There is guarding.   Abdominal pain focused mainly in the RUQ and RLQ   Musculoskeletal: Normal range of motion.   Neurological: She is alert.   Skin: Skin is warm.   Psychiatric: She has a normal mood and affect.       Significant Labs:  CBC:   Recent Labs   Lab 03/10/20  0340   WBC 17.25*   RBC 2.70*   HGB 8.4*   HCT 25.1*      MCV 93   MCH 31.1*   MCHC 33.5     CMP:   Recent Labs   Lab 03/10/20  0340   GLU 92   CALCIUM 8.0*   ALBUMIN 1.9*   PROT 5.8*   *   K 3.3*   CO2 20*   CL 93*   BUN 37*   CREATININE 0.9   ALKPHOS 959*   ALT 52*   AST 47*   BILITOT 3.5*     Lactic Acid:   Recent Labs   Lab 03/10/20  0340   LACTATE 0.6     Lipase:   Recent Labs   Lab 03/10/20  0340   LIPASE 35       Significant Diagnostics:  CT: Pneumoperitoneum, small volume    Assessment/Plan:     Active Diagnoses:    Diagnosis Date Noted POA    Pneumoperitoneum [K66.8] 03/10/2020 Yes      Problems Resolved During this Admission:     73 y/o F with  pancreatic cancer with plans to undergo [facility] next week with right nephrectomy and caval reconstruction with Dr. Wall. Transferred in with pneumoperitoneum.  HR stable.  BP is low.  Tenderness mostly in the right abdomen    Discussed extensively with pt and her  that exploration is indicated.  She is currently stable but has a very high risk of decompensation.  Free air in the setting of pancreatic cancer with right kidney obstruction is concerning for a possible malignant perforation although stent erosion or other etiologies are certainly a possibility.  Currently pt does not want an operation despite extensively discussing the risks.  She wants to wait for her  and to discuss options with him prior.  I did reach out to him as well.  He will be here possibly later today.  I did discuss the natural progression of abdominal sepsis from a hollow viscus injury. Given both the patient's and  decision to hold on operative intervention, will admit to the ICU for ongoing care    Admit to the SICU  NPO  PPI  ABX  Serial exams  If any changes in vitals of clinical status will discuss surgical intervention again.        Thank you for your consult. Admitted    Peng Wolff MD  General Surgery  [facility]

## 2020-03-10 NOTE — PROGRESS NOTES
Report received from Anesthesia. Pt transported to SICU [address] with portable telemetry and simple face mask in use. Pt connected to ICU monitor and Wall O2. SICU team called and made aware of patient arrival. New orders received and implemented.All questions answered, emotional support provided.     Admit Skin Note: Wound vac over abdominal incision with seal intact. Gauze dressing over G-tube site with minimal sanguineous drainage, sutures intact. Foam dressings to sacrum and bilateral heels. No skin breakdown or pressure injuries noted.

## 2020-03-10 NOTE — PROGRESS NOTES
Pharmacokinetic Initial Assessment: IV Vancomycin    Assessment/Plan:    - Initiate vancomycin with a loading dose of 1000 mg followed by 750 mg every 24 hours.   - A vancomycin trough level is ordered prior to the 3rd dose on 3/12 at 08:00.  Goal level 10-20 mg/dl.    Pharmacy will continue to follow and monitor vancomycin. Please call extension [phone] with any questions regarding this assessment.     Thank you for the consult,   Rebeca Schaffer, PharmD, BCCCP     Patient brief summary:  Julee Hawkins is a 74 y.o. female initiated on antimicrobial therapy with IV Vancomycin for treatment of suspected intra-abdominal infection.    Drug Allergies:   Review of patient's allergies indicates:   Allergen Reactions    Azithromycin Other (See Comments)     Stomach pain     Codeine Hallucinations     Actual Body Weight:   46.3 kg    Renal Function:   Estimated Creatinine Clearance: 39.5 mL/min (based on SCr of 0.9 mg/dL).    CBC (last 72 hours):  Recent Labs   Lab Result Units 03/10/20  0340   WBC K/uL 17.25*   Hemoglobin g/dL 8.4*   Hematocrit % 25.1*   Platelets K/uL 183   Gran% % 91.2*   Lymph% % 2.6*   Mono% % 4.9   Eosinophil% % 0.0   Basophil% % 0.2   Differential Method  Automated       Metabolic Panel (last 72 hours):  Recent Labs   Lab Result Units 03/10/20  0340 03/10/20  0440   Sodium mmol/L 124*  --    Potassium mmol/L 3.3*  --    Chloride mmol/L 93*  --    CO2 mmol/L 20*  --    Glucose mg/dL 92  --    Glucose, UA   --  Negative   BUN, Bld mg/dL 37*  --    Creatinine mg/dL 0.9  --    Albumin g/dL 1.9*  --    Total Bilirubin mg/dL 3.5*  --    Alkaline Phosphatase U/L 959*  --    AST U/L 47*  --    ALT U/L 52*  --    Magnesium mg/dL 1.6  --    Phosphorus mg/dL 2.1*  --        Drug levels (last 3 results):  No results for input(s): VANCOMYCINRA, VANCOMYCINPE, VANCOMYCINTR in the last 72 hours.    Microbiologic Results:  Microbiology Results (last 7 days)     Procedure Component Value Units Date/Time     Blood Culture #2 **CANNOT BE ORDERED STAT** [[id number]] Collected:  03/10/20 0340    Order Status:  Sent Specimen:  Blood from Peripheral, Hand, Right Updated:  03/10/20 0408    Blood Culture #1 **CANNOT BE ORDERED STAT** [[id number]] Collected:  03/10/20 0340    Order Status:  Sent Specimen:  Blood from Peripheral, Wrist, Left Updated:  03/10/20 0408

## 2020-03-10 NOTE — PROGRESS NOTES
Report received from RN. Pt transported to SICU [address]92 with portable telemetry. Pt connected to ICU monitor. SICU team called and made aware of patient arrival. New orders received and implemented. Pt assessed, immediate needs met. Pt updated on this PoC for remainder of shift. All questions answered, emotional support provided.     Admit Skin Note: Nephrostomy drain on R side with dressing CDI. Foam dressings applied to sacrum and bilateral heels. No skin breakdown or pressure injuries noted.

## 2020-03-10 NOTE — ANESTHESIA PROCEDURE NOTES
Intubation  Performed by: Bola Hubbard MD  Authorized by: Bola Hubbard MD     Intubation:     Induction:  Rapid sequence induction    Intubated:  Postinduction    Mask Ventilation:  N/a    Attempts:  1    Attempted By:  Staff anesthesiologist    Blade:  Gonzales 2    Laryngeal View Grade: Grade I - full view of chords      Difficult Airway Encountered?: No      Complications:  None    Airway Device:  Oral endotracheal tube    Airway Device Size:  7.0    Style/Cuff Inflation:  Cuffed (inflated to minimal occlusive pressure)    Inflation Amount (mL):  6    Tube secured:  21    Secured at:  The teeth    Placement Verified By:  Capnometry    Complicating Factors:  None    Findings Post-Intubation:  BS equal bilateral

## 2020-03-10 NOTE — PLAN OF CARE
CM obtained discharge planning assessment from medical records.  Contact number placed on whiteboard.      Shalom Cerna MD   [address] [facility] / [place] [address]      [facility] [place] [address]  [address]  [place] [address]  Phone: [phone] Fax: [phone]    [facility] - [place] - [address]  [address] [place] [address]  Phone: [phone] Fax: [phone]    [facility] - [place], [place] - [address]  [address]  [place] [address]  Phone: [phone] Fax: [phone]      Payor: [place] [employer] [facility] / Plan: BCBS ALL OUT OF STATE / Product Type: PPO /     Extended Emergency Contact Information  Primary Emergency Contact: Angel Hawkins  Address: [address] [place] [address] [place] [address] [facility]  Home Phone: [phone]  Mobile Phone: [phone]  Relation: Spouse       03/10/20 1502   Discharge Assessment   Assessment Type Discharge Planning Assessment   Assessment information obtained from? Medical Record   Prior to hospitilization cognitive status: Alert/Oriented   Current cognitive status: Unable to Assess   Current Functional Status:   (unable to assess)   Lives With spouse   Able to Return to Prior Arrangements yes   Is patient able to care for self after discharge? Unable to determine at this time (comments)   Who are your caregiver(s) and their phone number(s)? Angel Hawkins (spouse)- [phone]   Patient currently being followed by outpatient case management? No   Patient currently receives any other outside agency services? No   Do you have any problems affording any of your prescribed medications? No   Is the patient taking medications as prescribed? yes   Does the patient have transportation home? Yes   Transportation Anticipated family or friend will provide   Dialysis Name and Scheduled days N/A   Does the patient receive services at the [facility]?  No   Discharge Plan A Home Health   Discharge Plan B Home with family   DME Needed Upon Discharge  other (see comments)  (TBD)   Patient/Family in Agreement with Plan unable to assess       Flores Helms MPH, RN, CM  Ext. [phone]

## 2020-03-10 NOTE — ANESTHESIA PREPROCEDURE EVALUATION
03/10/2020    [facility]  Anesthesia Pre-Operative Evaluation         Patient Name: Julee Hawkins  YOB: 1945  MRN: [id number]    SUBJECTIVE:     Pre-operative evaluation for Procedure(s) (LRB):  LAPAROTOMY, EXPLORATORY (N/A)     03/10/2020    Julee Hawkins is a 74 y.o. 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 CT scan was significant for free air.  She as transferred to [facility] for surgical consult.  NPO since yesterday afternoon (~4pm). She is HDS at this time.     Patient now presents for the above procedure(s).      LDA:        Peripheral IV - Single Lumen 20 G Left Wrist (Active)   Site Assessment Clean;Dry;Intact 3/10/2020  3:24 AM   Number of days:             Peripheral IV - Single Lumen 03/10/20 0647 20 G Right Antecubital (Active)   Site Assessment Clean;Dry;Intact 3/10/2020  6:47 AM   Line Status Blood return noted 3/10/2020  6:47 AM   Number of days: 0            Nephrostomy Right (Active)   Output (mL) 110 mL 3/10/2020  7:15 AM   Number of days:             Urethral Catheter 03/10/20 0529 Straight-tip (Active)   Site Assessment Clean;Intact 3/10/2020  5:30 AM   Collection Container Standard drainage bag 3/10/2020  5:30 AM   Securement Method secured to top of thigh w/ adhesive device 3/10/2020  5:30 AM   Output (mL) 55 mL 3/10/2020  8:00 AM   Number of days: 0       Prev airway:    Placement Date: 02/14/20; Placement Time: 1211; Method of Intubation: Direct laryngoscopy, Rapid Sequence Induction; Inserted by: Anesthesia MD; Airway Device: Endotracheal Tube-Hi/Lo; Mask Ventilation: Not Attempted; Intubated: Postinduction; Blade: Christian  #2; Airway Device Size: 7.0; Style: Cuffed; Cuff Inflation: Minimal occlusive pressure; Inflation Amount: 6; Placement Verified By: Auscultation, Capnometry, ETT Condensation; Grade: Grade I; Complicating Factors: None; Intubation Findings: Positive EtCO2, Bilateral breath sounds, Atraumatic/Condition of teeth unchanged;  Depth of Insertion: 22; Securment: Teeth; Complications: None; Breath Sounds: Equal Bilateral; Insertion Attempts: 1      Drips:    lactated ringers 125 mL/hr at 03/10/20 0800       Patient Active Problem List   Diagnosis    Malignant neoplasm of head of pancreas    Severe protein-calorie malnutrition    Unintentional weight loss    Vitamin D deficiency    Ureteral stricture, right    Pneumoperitoneum       Review of patient's allergies indicates:   Allergen Reactions    Azithromycin Other (See Comments)     Stomach pain     Codeine Hallucinations       Current Inpatient Medications:   enoxaparin  40 mg Subcutaneous Daily    fluconazole (DIFLUCAN) IVPB  200 mg Intravenous Q24H    pantoprazole  40 mg Intravenous BID    piperacillin-tazobactam (ZOSYN) IVPB  4.5 g Intravenous Q8H    [START ON 3/11/2020] vancomycin (VANCOCIN) IVPB  750 mg Intravenous Q24H    vancomycin (VANCOCIN) IVPB  1,000 mg Intravenous Once       No current facility-administered medications on file prior to encounter.      Current Outpatient Medications on File Prior to Encounter   Medication Sig Dispense Refill    buPROPion (WELLBUTRIN XL) 150 MG TB24 tablet Take 150 mg by mouth once daily.      gabapentin (NEURONTIN) 300 MG capsule Take 300 mg by mouth 3 (three) times daily.      levothyroxine (SYNTHROID) 50 MCG tablet Take 50 mcg by mouth once daily.      methocarbamoL (ROBAXIN) 500 MG Tab Take 500 mg by mouth 4 (four) times daily.      OLANZapine (ZYPREXA) 5 MG tablet Take 5 mg by mouth every evening.      ondansetron (ZOFRAN-ODT) 4 MG TbDL DISSOLVE 1 TABLET UNDER TONGUE EVERY 6 HOURS AS NEEDED FOR NAUSEA  AND VOMITING  0    pantoprazole (PROTONIX) 40 MG tablet Take 40 mg by mouth once daily.      prochlorperazine (COMPAZINE) 10 MG tablet Take 10 mg by mouth every 6 (six) hours as needed.      acetaminophen (TYLENOL) 650 MG TbSR Take 1,000 mg by mouth 3 (three) times daily.       aspirin (ECOTRIN) 81 MG EC tablet Take 81 mg by mouth once daily.      b complex vitamins capsule Take 1 capsule by mouth once daily.      calcium carbonate 1250 MG capsule Take 1,250 mg by mouth 2 (two) times daily with meals.      celecoxib (CELEBREX) 200 MG capsule TK 1 C PO QAM  1    cholecalciferol, vitamin D3, (VITAMIN D3) 10 mcg/mL (400 unit/mL) Drop Take by mouth.      cycloSPORINE (RESTASIS) 0.05 % ophthalmic emulsion 1 drop 2 (two) times daily.      denosumab (PROLIA) 60 mg/mL Syrg Inject 60 mg into the skin.      denosumab (PROLIA) 60 mg/mL Syrg Inject into the skin.      ergocalciferol (VITAMIN D2) 50,000 unit Cap Take 8,000 Units by mouth every 7 days.       HYDROcodone-acetaminophen (NORCO) 7.5-325 mg per tablet Take 1 tablet by mouth every 6 (six) hours as needed for Pain.      lipase-protease-amylase 24,000-76,000-120,000 units (CREON) 24,000-76,000 -120,000 unit capsule Take 1 capsule by mouth 3 (three) times daily with meals. 270 capsule 3    multivitamin/iron/folic acid (CENTRUM COMPLETE ORAL) Take by mouth.      ondansetron (ZOFRAN-ODT) 8 MG TbDL Take 8 mg by mouth once.      oxyCODONE-acetaminophen (PERCOCET)  mg per tablet Take 1 tablet by mouth every 6 (six) hours as needed.  0    pantoprazole (PROTONIX) 40 MG tablet Take by mouth.      sertraline (ZOLOFT) 25 MG tablet Take 25 mg by mouth once daily.      tiZANidine (ZANAFLEX) 4 MG tablet Take 4 mg by mouth every 6 (six) hours as needed.      traMADol (ULTRAM) 50 mg tablet Take 50 mg by mouth every 6 (six) hours as needed for Pain.      TURMERIC ORAL Take by mouth.         Past Surgical History:   Procedure Laterality Date    ANTEGRADE  NEPHROSTOGRAPHY Right 2/13/2020    Procedure: Nephrostogram - antegrade;  Surgeon: Moncho Rowell MD;  Location: [facility] OR 1ST FLR;  Service: Urology;  Laterality: Right;    APPENDECTOMY      BACK SURGERY      BREAST SURGERY      lumpectomy    CHOLECYSTECTOMY      CYSTOSCOPY W/ RETROGRADES Right 2/13/2020    Procedure: CYSTOSCOPY, WITH RETROGRADE PYELOGRAM;  Surgeon: Moncho Rowell MD;  Location: [facility] OR 1ST FLR;  Service: Urology;  Laterality: Right;  1hr    ENDOSCOPIC ULTRASOUND OF UPPER GASTROINTESTINAL TRACT N/A 6/13/2019    Procedure: ULTRASOUND, UPPER GI TRACT, ENDOSCOPIC;  Surgeon: Jhonathan Abdul MD;  Location: [facility] ENDO (2ND FLR);  Service: Endoscopy;  Laterality: N/A;    ERCP N/A 6/13/2019    Procedure: ERCP (ENDOSCOPIC RETROGRADE CHOLANGIOPANCREATOGRAPHY);  Surgeon: Jhonathan Abdul MD;  Location: [facility] ENDO (2ND FLR);  Service: Endoscopy;  Laterality: N/A;    ERCP N/A 2/14/2020    Procedure: ERCP (ENDOSCOPIC RETROGRADE CHOLANGIOPANCREATOGRAPHY);  Surgeon: Coleman Merritt MD;  Location: [facility] ENDO (2ND FLR);  Service: Endoscopy;  Laterality: N/A;    HYSTERECTOMY      rectovaginal fistula repair      before 2010, from BRCA chemotherapy    SBR Right 10/09/2019    with open repair of R femoral hernia     SPINE SURGERY      URETEROSCOPY Right 2/13/2020    Procedure: URETEROSCOPY;  Surgeon: Moncho Rowell MD;  Location: [facility] OR 1ST FLR;  Service: Urology;  Laterality: Right;       Social History     Socioeconomic History    Marital status:      Spouse name: Not on file    Number of children: 1    Years of education: Not on file    Highest education level: Not on file   Occupational History    Occupation:    Social Needs    Financial resource strain: Not on file    Food insecurity:     Worry: Not on file     Inability: Not on file    Transportation needs:     Medical: Not on file     Non-medical: Not on file   Tobacco Use    Smoking status: Former  Smoker     Last attempt to quit: 1969     Years since quittin.7    Smokeless tobacco: Never Used   Substance and Sexual Activity    Alcohol use: Yes     Alcohol/week: 2.0 standard drinks     Types: 2 Glasses of wine per week     Comment: 2 glasses of red wine/ night . Not currently     Drug use: Never    Sexual activity: Not on file   Lifestyle    Physical activity:     Days per week: Not on file     Minutes per session: Not on file    Stress: Not on file   Relationships    Social connections:     Talks on phone: Not on file     Gets together: Not on file     Attends Restorationist service: Not on file     Active member of club or organization: Not on file     Attends meetings of clubs or organizations: Not on file     Relationship status: Not on file   Other Topics Concern    Not on file   Social History Narrative    Not on file       OBJECTIVE:     Vital Signs Range (Last 24H):  Temp:  [36.8 °C (98.2 °F)-37 °C (98.6 °F)]   Pulse:  [80-98]   Resp:  [16-33]   BP: (101-116)/(56-70)   SpO2:  [96 %-100 %]       Significant Labs:  Lab Results   Component Value Date    WBC 17.25 (H) 03/10/2020    HGB 8.4 (L) 03/10/2020    HCT 25.1 (L) 03/10/2020     03/10/2020    ALT 52 (H) 03/10/2020    AST 47 (H) 03/10/2020     (L) 03/10/2020    K 3.3 (L) 03/10/2020    CL 93 (L) 03/10/2020    CREATININE 0.9 03/10/2020    BUN 37 (H) 03/10/2020    CO2 20 (L) 03/10/2020    INR 1.2 03/10/2020    HGBA1C 4.9 2020       Diagnostic Studies: No relevant studies.    EKG:   Results for orders placed or performed in visit on 03/10/20   EKG 12-lead    Collection Time: 03/10/20  3:25 AM    Narrative    Test Reason : R07.9    Vent. Rate : 090 BPM     Atrial Rate : 090 BPM     P-R Int : 152 ms          QRS Dur : 072 ms      QT Int : 342 ms       P-R-T Axes : 075 065 077 degrees     QTc Int : 418 ms    Normal sinus rhythm  Nonspecific T wave abnormality  Abnormal ECG  No previous ECGs available    Referred By: ALEXANDRE  JAYSON           Confirmed By:        2D ECHO:  TTE:  No results found for this or any previous visit.    JAIRO:  No results found for this or any previous visit.    ASSESSMENT/PLAN:         Anesthesia Evaluation    I have reviewed the Patient Summary Reports.    I have reviewed the Nursing Notes.   I have reviewed the Medications.     Review of Systems  Anesthesia Hx:  No problems with previous Anesthesia  History of prior surgery of interest to airway management or planning: cervical fusion. Previous anesthesia: General Denies Family Hx of Anesthesia complications.   Denies Personal Hx of Anesthesia complications.   Social:  Non-Smoker, No Alcohol Use    Hematology/Oncology:         -- Anemia: Hematology Comments: Hgb 8.4/ Hct 25.1 Current/Recent Cancer. (pancreatic cancer)   EENT/Dental:EENT/Dental Normal   Cardiovascular:  Cardiovascular Normal  Denies MI.    Denies Angina.        Pulmonary:  Pulmonary Normal  Denies COPD.  Denies Asthma.    Hepatic/GI:   GERD, well controlled    Musculoskeletal:   Anterior cervical fusion C3/C4/C5 Spine Disorders: cervical    Neurological:   Chronic Pain Syndrome   Endocrine:   Hypothyroidism        Physical Exam  General:  Malnutrition    Airway/Jaw/Neck:  Airway Findings: Mouth Opening: Normal Tongue: Normal  General Airway Assessment: Adult  Mallampati: I  Improves to I with phonation.  TM Distance: Normal, at least 6 cm  Jaw/Neck Findings:  Neck ROM: Extension Decreased, Mild  Neck Findings:  S/p Anterior c-spine fusion; mildly decreased extension     Eyes/Ears/Nose:  EYES/EARS/NOSE FINDINGS: Normal   Dental:  Dental Findings: In tact   Chest/Lungs:  Chest/Lungs Findings: Clear to auscultation, Normal Respiratory Rate     Heart/Vascular:  Heart Findings: Rate: Normal  Rhythm: Regular Rhythm     Abdomen:  Abdomen Findings:  Tenderness      Skin:  Skin Findings:  Jaundice     Mental Status:  Mental Status Findings:  Cooperative, Alert and Oriented         Anesthesia  Plan  Type of Anesthesia, risks & benefits discussed:  Anesthesia Type:  general  Patient's Preference:   Intra-op Monitoring Plan: standard ASA monitors  Intra-op Monitoring Plan Comments:   Post Op Pain Control Plan: multimodal analgesia, IV/PO Opioids PRN and per primary service following discharge from PACU  Post Op Pain Control Plan Comments:   Induction:   IV  Beta Blocker:  Patient is not currently on a Beta-Blocker (No further documentation required).       Informed Consent: Patient understands risks and agrees with Anesthesia plan.  Questions answered. Anesthesia consent signed with patient.  ASA Score: 3  emergent   Day of Surgery Review of History & Physical:    H&P update referred to the provider.         Ready For Surgery From Anesthesia Perspective.

## 2020-03-10 NOTE — PLAN OF CARE
SICU PLAN OF CARE NOTE    Dx: Pneumoperitoneum    Shift Events: Admit to SICU from ED @ 0715. Class A to OR for ExLap, readmitted to SICU extubated. No acute events.    Goals of Care: MAPs >65.     Neuro: AAO x4, Arouses to Voice, Follows Commands and Moves All Extremities    Vital Signs: /67   Pulse 79   Temp 97.6 °F (36.4 °C) (Oral)   Resp 15   Wt 46.3 kg (102 lb)   SpO2 99%   Breastfeeding? No   BMI 18.66 kg/m²     Respiratory: Room Air    Diet: NPO    Gtts: MIVF and PCA    Urine Output: Urinary Catheter 50 cc/hour    Drains:   Nephrostomy total output: 310 cc / shift  G-Tube to dependent drainage total output: 120 cc / shift    Wound Vac @ 125 mmHg over abdominal incision, 0 cc output / shfit       Labs/Accuchecks: Accuchecks Q6H, Labs trended.    Skin: Wound vac over abdominal incision with seal intact. Gauze dressing over G-tube site with minimal sanguineous drainage, sutures intact. Foam dressings to sacrum and bilateral heels. No skin breakdown or pressure injuries noted.

## 2020-03-10 NOTE — PLAN OF CARE
SW is following this Pt for DC planning needs. There are no identified needs at this time.    SW will continue to coordinate with patient, family, team and insurance to complete patient's discharge plan.    Zena Wilcox LMSW   - Case Management

## 2020-03-10 NOTE — HPI
Julee Hawkins is a 74 y.o. female [place] [facility] with right sided abdominal pain and fatigue. Pt denies any fever, nausea or vomiting. 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  Pt has not urinated or had a BM in 3 days at time of presentation.  Pt has been having abdominal pain since Saturday.  Transferred after CT findings. CT scan was remarkable for pneumoperitoneum and pneumobilia Reportedly had been responding to neoadjuvant chemo based on last PET.  She is scheduled to undergo a Whipple procedure 3/16/20. Labs remarkable for a leukocytosis and elevated bilirubin. The patient was given antibiotics and transferred here. Pt seen by general surgery in in ED.   Pt arrives to SICU no supplemental O2, HDS, no pressors. Pain well controlled at this time.

## 2020-03-10 NOTE — OP NOTE
DATE OF PROCEDURE: 03/10/2020    PREOPERATIVE DIAGNOSES:   1. Pneumoperitoneum  2. Pancreatic cancer    POSTOPERATIVE DIAGNOSES:   Same     PROCEDURES PERFORMED:   1. Esophagogastroduodenoscopy    ATTENDING SURGEON: Quincy Barry MD    HOUSESTAFF SURGEON: None    ANESTHESIA: GETA    ESTIMATED BLOOD LOSS: 2 mL     FINDINGS: Small perforation at level of duodenal stent, no other obvious ulcers or perforation.     SPECIMEN: None.     DRAINS: None.     COMPLICATIONS: None.     INDICATIONS: Julee Hawkins is a 74 y.o.female referred to [facility] for pancreatic head mass as well as pneumoperitoneum from outside facility.  Patient taken to OR for exploration with surgical oncology service at which time I performed an intraoperative EGD to determine if there were any other abnormalities.      OPERATIVE PROCEDURE: The patient was identified in preoperative holding and brought back to the operating room. Anesthesia was induced. A timeout procedure was performed and all team members present agreed this was the correct procedure on the correct patient.  The abdominal exploration portion of the case to be dictated separately by the surgical oncology service.    An upper endoscope was introduced into the oropharynx and guided down into the esophagus and stomach. The stomach was insufflated with air. We intubated the duodenum and examined the first and second portions; there was a small area at the level of the duodenal stent with some surrounding inflammation that was the level of the perforation.  I did not see any other ulcers throughout the stomach.  It was evident that this was the source of the patient's pneumoperitoneum.  At this time, the case was performed by the surgical oncology service, to be dictated separately.

## 2020-03-11 LAB
ALBUMIN SERPL BCP-MCNC: 1.7 G/DL (ref 3.5–5.2)
ALP SERPL-CCNC: 729 U/L (ref 55–135)
ALT SERPL W/O P-5'-P-CCNC: 46 U/L (ref 10–44)
ANION GAP SERPL CALC-SCNC: 11 MMOL/L (ref 8–16)
AST SERPL-CCNC: 51 U/L (ref 10–40)
BASOPHILS # BLD AUTO: 0.01 K/UL (ref 0–0.2)
BASOPHILS NFR BLD: 0.2 % (ref 0–1.9)
BILIRUB SERPL-MCNC: 2.1 MG/DL (ref 0.1–1)
BUN SERPL-MCNC: 24 MG/DL (ref 8–23)
CALCIUM SERPL-MCNC: 7.8 MG/DL (ref 8.7–10.5)
CHLORIDE SERPL-SCNC: 103 MMOL/L (ref 95–110)
CO2 SERPL-SCNC: 19 MMOL/L (ref 23–29)
CREAT SERPL-MCNC: 0.7 MG/DL (ref 0.5–1.4)
DIFFERENTIAL METHOD: ABNORMAL
EOSINOPHIL # BLD AUTO: 0 K/UL (ref 0–0.5)
EOSINOPHIL NFR BLD: 0 % (ref 0–8)
ERYTHROCYTE [DISTWIDTH] IN BLOOD BY AUTOMATED COUNT: 15.9 % (ref 11.5–14.5)
EST. GFR  (AFRICAN AMERICAN): >60 ML/MIN/1.73 M^2
EST. GFR  (NON AFRICAN AMERICAN): >60 ML/MIN/1.73 M^2
GLUCOSE SERPL-MCNC: 128 MG/DL (ref 70–110)
HCT VFR BLD AUTO: 27.2 % (ref 37–48.5)
HGB BLD-MCNC: 8.7 G/DL (ref 12–16)
IMM GRANULOCYTES # BLD AUTO: 0.07 K/UL (ref 0–0.04)
IMM GRANULOCYTES NFR BLD AUTO: 1.1 % (ref 0–0.5)
LYMPHOCYTES # BLD AUTO: 0.4 K/UL (ref 1–4.8)
LYMPHOCYTES NFR BLD: 5.7 % (ref 18–48)
MAGNESIUM SERPL-MCNC: 2.7 MG/DL (ref 1.6–2.6)
MCH RBC QN AUTO: 31 PG (ref 27–31)
MCHC RBC AUTO-ENTMCNC: 32 G/DL (ref 32–36)
MCV RBC AUTO: 97 FL (ref 82–98)
MONOCYTES # BLD AUTO: 0.2 K/UL (ref 0.3–1)
MONOCYTES NFR BLD: 3.6 % (ref 4–15)
NEUTROPHILS # BLD AUTO: 5.5 K/UL (ref 1.8–7.7)
NEUTROPHILS NFR BLD: 89.4 % (ref 38–73)
NRBC BLD-RTO: 0 /100 WBC
PHOSPHATE SERPL-MCNC: 4.5 MG/DL (ref 2.7–4.5)
PLATELET # BLD AUTO: 170 K/UL (ref 150–350)
PMV BLD AUTO: 10.3 FL (ref 9.2–12.9)
POCT GLUCOSE: 110 MG/DL (ref 70–110)
POCT GLUCOSE: 118 MG/DL (ref 70–110)
POTASSIUM SERPL-SCNC: 4.2 MMOL/L (ref 3.5–5.1)
PROT SERPL-MCNC: 5.5 G/DL (ref 6–8.4)
RBC # BLD AUTO: 2.81 M/UL (ref 4–5.4)
SODIUM SERPL-SCNC: 133 MMOL/L (ref 136–145)
WBC # BLD AUTO: 6.16 K/UL (ref 3.9–12.7)

## 2020-03-11 PROCEDURE — 63600175 PHARM REV CODE 636 W HCPCS: Performed by: STUDENT IN AN ORGANIZED HEALTH CARE EDUCATION/TRAINING PROGRAM

## 2020-03-11 PROCEDURE — 99233 SBSQ HOSP IP/OBS HIGH 50: CPT | Mod: 24,,, | Performed by: SURGERY

## 2020-03-11 PROCEDURE — 85025 COMPLETE CBC W/AUTO DIFF WBC: CPT

## 2020-03-11 PROCEDURE — C9113 INJ PANTOPRAZOLE SODIUM, VIA: HCPCS | Performed by: STUDENT IN AN ORGANIZED HEALTH CARE EDUCATION/TRAINING PROGRAM

## 2020-03-11 PROCEDURE — 97161 PT EVAL LOW COMPLEX 20 MIN: CPT

## 2020-03-11 PROCEDURE — 99233 PR SUBSEQUENT HOSPITAL CARE,LEVL III: ICD-10-PCS | Mod: 24,,, | Performed by: SURGERY

## 2020-03-11 PROCEDURE — 83735 ASSAY OF MAGNESIUM: CPT

## 2020-03-11 PROCEDURE — 63600175 PHARM REV CODE 636 W HCPCS: Performed by: GENERAL PRACTICE

## 2020-03-11 PROCEDURE — 97165 OT EVAL LOW COMPLEX 30 MIN: CPT

## 2020-03-11 PROCEDURE — 97116 GAIT TRAINING THERAPY: CPT

## 2020-03-11 PROCEDURE — 84100 ASSAY OF PHOSPHORUS: CPT

## 2020-03-11 PROCEDURE — 80053 COMPREHEN METABOLIC PANEL: CPT

## 2020-03-11 PROCEDURE — 97535 SELF CARE MNGMENT TRAINING: CPT

## 2020-03-11 PROCEDURE — 20600001 HC STEP DOWN PRIVATE ROOM

## 2020-03-11 RX ORDER — LEVOTHYROXINE SODIUM ANHYDROUS 100 UG/5ML
25 INJECTION, POWDER, LYOPHILIZED, FOR SOLUTION INTRAVENOUS DAILY
Status: DISCONTINUED | OUTPATIENT
Start: 2020-03-13 | End: 2020-03-16

## 2020-03-11 RX ADMIN — PIPERACILLIN SODIUM AND TAZOBACTAM SODIUM 4.5 G: 4; .5 INJECTION, POWDER, LYOPHILIZED, FOR SOLUTION INTRAVENOUS at 04:03

## 2020-03-11 RX ADMIN — Medication: at 05:03

## 2020-03-11 RX ADMIN — FLUCONAZOLE 200 MG: 2 INJECTION, SOLUTION INTRAVENOUS at 08:03

## 2020-03-11 RX ADMIN — Medication: at 04:03

## 2020-03-11 RX ADMIN — SODIUM CHLORIDE 500 ML: 0.9 INJECTION, SOLUTION INTRAVENOUS at 12:03

## 2020-03-11 RX ADMIN — SODIUM CHLORIDE: 0.9 INJECTION, SOLUTION INTRAVENOUS at 07:03

## 2020-03-11 RX ADMIN — PANTOPRAZOLE SODIUM 40 MG: 40 INJECTION, POWDER, FOR SOLUTION INTRAVENOUS at 09:03

## 2020-03-11 RX ADMIN — PIPERACILLIN SODIUM AND TAZOBACTAM SODIUM 4.5 G: 4; .5 INJECTION, POWDER, LYOPHILIZED, FOR SOLUTION INTRAVENOUS at 01:03

## 2020-03-11 RX ADMIN — PIPERACILLIN SODIUM AND TAZOBACTAM SODIUM 4.5 G: 4; .5 INJECTION, POWDER, LYOPHILIZED, FOR SOLUTION INTRAVENOUS at 08:03

## 2020-03-11 RX ADMIN — ENOXAPARIN SODIUM 40 MG: 100 INJECTION SUBCUTANEOUS at 04:03

## 2020-03-11 RX ADMIN — SODIUM CHLORIDE 1000 ML: 0.9 INJECTION, SOLUTION INTRAVENOUS at 06:03

## 2020-03-11 NOTE — HOSPITAL COURSE
3/10/20: Approx 2 mm perforation the lateral aspect of the proximal duodenum overlying the indwelling duodenal stent. EGD. Closed primarily with omental patch. Liver nodule in segment III. Excisional biopsy. Peritoneal nodule x 2 sent as well. Open Jaime gastrostomy tube. Incisional wound VAC.

## 2020-03-11 NOTE — PT/OT/SLP EVAL
Physical Therapy Evaluation and treatment  Patient Name:  Julee Hawkins   MRN:  [id number]    Recommendations:     Discharge Recommendations:  nursing facility, skilled   Discharge Equipment Recommendations: (will determine DME needs closer to discharge)   Barriers to discharge: Decreased caregiver support  may not be able to assist at current functional level.     Assessment:     Julee Hawkins is a 74 y.o. female admitted with a medical diagnosis of Pneumoperitoneum.  She presents with the following impairments/functional limitations:  weakness, gait instability, impaired balance, impaired endurance, decreased lower extremity function, impaired functional mobilty .pt isabella treatment well and will benefit from skilled PT 4x/wk to progress physically. Pt may need SNF placement to maximize rehab potential. Pt is s/p exp lap, closure ulcer, biopsy liver G-tube 3/10/20.    Rehab Prognosis: Good; patient would benefit from acute skilled PT services to address these deficits and reach maximum level of function.    Recent Surgery: Procedure(s) (LRB):  LAPAROTOMY, EXPLORATORY (N/A)  CLOSURE, ULCER, PERFORATED, DUODENUM, USING OMENTAL PATCH  BIOPSY, LIVER  INSERTION, PEG TUBE 1 Day Post-Op    Plan:     During this hospitalization, patient to be seen 4 x/week to address the identified rehab impairments via gait training, therapeutic activities and progress toward the following goals:    · Plan of Care Expires:  04/09/20    Subjective     Chief Complaint: pt c/o pain during treatment.   Patient/Family Comments/goals:  To get better and go home.   Pain/Comfort:  · Pain Rating 1: 7/10(abdomen)  · Pain Rating Post-Intervention 1: 7/10    Patients cultural, spiritual, Yazidi conflicts given the current situation: no    Living Environment:  Pt is homemaker and lives with her  retired  in 2 story with B&B downstairs. Pt has 4 steps with no rail to entrance.   Prior to admission, patients level of function was Independent .  Equipment used at home: rollator.  DME owned (not currently used): none.  Upon discharge, patient will have assistance from . .    Objective:     Communicated with nurse prior to session.  Patient found supine with telemetry, blood pressure cuff, pulse ox (continuous), SCD, rebollar catheter, wound vac, PCA, PICC line(rebollar to abdomen, port a cath, nephrostomy tube)  upon PT entry to room.    General Precautions: Standard, fall   Orthopedic Precautions:    Braces:       Exams:  · Cognitive Exam:  Patient is oriented to Person, Place, Time and Situation  · RLE ROM: WFL  · RLE Strength: WFL  · LLE ROM: WFL  · LLE Strength: WFL    Functional Mobility:  · Bed Mobility:  Pt needed verbal cues for hand placement and sequencing for functional mobility.   · Rolling Left:  contact guard assistance  · Supine to Sit: contact guard assistance  ·   · Transfers:     · Sit to Stand:  contact guard assistance with hand-held assist  ·   · Gait: pt received gait training ~ 4 ft with CGA.       Therapeutic Activities and Exercises:   pt received verbal instructions in role of PT and POC. Pt verbally expressed understanding of such.     AM-PAC 6 CLICK MOBILITY  Total Score:15     Patient left up in chair with all lines intact and call button in reach.    GOALS:   Multidisciplinary Problems     Physical Therapy Goals        Problem: Physical Therapy Goal    Goal Priority Disciplines Outcome Goal Variances Interventions   Physical Therapy Goal     PT, PT/OT Ongoing, Progressing     Description:  Goals to be met by: 3/25/20    Patient will increase functional independence with mobility by performin. Supine to sit with Stand-by Assistance -not met  2. Sit to stand transfer with Supervision -not met  3. Gait  x 150 feet with Contact Guard Assistance using AD if needed.. -not  met  4. Ascend/descend 4 stair with no Handrails Contact Guard Assistance . -not met                      History:     Past Medical History:   Diagnosis Date    Arthritis     Biliary stricture     Cancer     Breast cancer    Cholangitis     Dilated bile duct     Duodenal stenosis     Jaundice     Pancreas cyst     Pancreatic mass     Protein calorie malnutrition     Thyroid disease     Ureteral stricture, right     Vitamin D deficiency     Weight loss, unintentional        Past Surgical History:   Procedure Laterality Date    ANTEGRADE NEPHROSTOGRAPHY Right 2/13/2020    Procedure: Nephrostogram - antegrade;  Surgeon: Moncho Rowell MD;  Location: [facility] OR [address] [place] [address];  Service: Urology;  Laterality: Right;    APPENDECTOMY      BACK SURGERY      BREAST SURGERY      lumpectomy    CHOLECYSTECTOMY      CLOSURE OF PERFORATED ULCER OF DUODENUM USING OMENTAL PATCH  3/10/2020    Procedure: CLOSURE, ULCER, PERFORATED, DUODENUM, USING OMENTAL PATCH;  Surgeon: Quang Wall MD;  Location: [facility] OR 2ND FLR;  Service: General;;    CYSTOSCOPY W/ RETROGRADES Right 2/13/2020    Procedure: CYSTOSCOPY, WITH RETROGRADE PYELOGRAM;  Surgeon: Moncho Rowell MD;  Location: [facility] OR [address] [place] [address];  Service: Urology;  Laterality: Right;  1hr    ENDOSCOPIC ULTRASOUND OF UPPER GASTROINTESTINAL TRACT N/A 6/13/2019    Procedure: ULTRASOUND, UPPER GI TRACT, ENDOSCOPIC;  Surgeon: Jhonathan Abdul MD;  Location: [facility] (2ND FLR);  Service: Endoscopy;  Laterality: N/A;    ERCP N/A 6/13/2019    Procedure: ERCP (ENDOSCOPIC RETROGRADE CHOLANGIOPANCREATOGRAPHY);  Surgeon: Jhonathan Abdul MD;  Location: [facility] ENDO (2ND FLR);  Service: Endoscopy;  Laterality: N/A;    ERCP N/A 2/14/2020    Procedure: ERCP (ENDOSCOPIC RETROGRADE CHOLANGIOPANCREATOGRAPHY);  Surgeon: Coleman Merritt MD;  Location: [facility] ENDO (2ND FLR);  Service: Endoscopy;  Laterality: N/A;    HYSTERECTOMY      LIVER BIOPSY  3/10/2020    Procedure:  BIOPSY, LIVER;  Surgeon: Quang Wall MD;  Location: [facility] OR 2ND FLR;  Service: General;;    rectovaginal fistula repair      before 2010, from BRCA chemotherapy    SBR Right 10/09/2019    with open repair of R femoral hernia     SPINE SURGERY      URETEROSCOPY Right 2/13/2020    Procedure: URETEROSCOPY;  Surgeon: Moncho Rowell MD;  Location: [facility] OR [facility];  Service: Urology;  Laterality: Right;       Time Tracking:     PT Received On: 03/11/20  PT Start Time: 1102     PT Stop Time: 1128  PT Total Time (min): 26 min     Billable Minutes: Evaluation 8 min and Gait Training 16 min      Nelida Thorne, PT  03/11/2020

## 2020-03-11 NOTE — PT/OT/SLP EVAL
"Occupational Therapy   Evaluation    Name: Julee Hawkins  MRN: [id number]  Admitting Diagnosis:  Pneumoperitoneum 1 Day Post-Op  LAPAROTOMY, EXPLORATORY (N/A)  CLOSURE, ULCER, PERFORATED, DUODENUM, USING OMENTAL PATCH  BIOPSY, LIVER  INSERTION, PEG TUBE   Recommendations:     Discharge Recommendations: nursing facility, skilled  Discharge Equipment Recommendations:  (TBD (progress pending))  Barriers to discharge:  (Increased assistance needed)    Assessment:     Julee Hawkins is a 74 y.o. female with a medical diagnosis of Pneumoperitoneum.  She presents with performance deficits including weakness, impaired endurance, impaired self care skills, impaired functional mobilty, gait instability, impaired balance, decreased lower extremity function, pain. Pt pleasant to work with, maintaining mood throughout session. Pt presenting with decreased functional independence at this time due to pain, decreased endurance, increased weakness, and decreased activity tolerance. Pt would continue to benefit from acute skilled OT services to increase functional independence. OT to recommend SNF upon D/C to improve quality of life.       Rehab Prognosis: Good; patient would benefit from acute skilled OT services to address these deficits and reach maximum level of function.       Plan:     Patient to be seen 3 x/week to address the above listed problems via self-care/home management, therapeutic activities, therapeutic exercises  · Plan of Care Expires: 04/11/20  · Plan of Care Reviewed with: patient    Subjective   "We're going to do all that today??" -when told session plans of getting up to chair as tolerated.    Chief Complaint: Pain  Patient/Family Comments/goals: Return to PLOF    Occupational Profile:  Living Environment: Pt reports living with spouse in [facility], primarily lives on 1st floor, 4 [address]. Tub/shower combo; Spouse recently bought her a rollator, usually does not need DME/AD.  Previous level of function: " Assistance as needed with ADLs/IADLs,   Assistance upon Discharge: Spouse will assist as needed    Pain/Comfort:  · Pain Rating 1: 7/10  · Location - Orientation 1: generalized  · Location 1: abdomen  · Pain Rating Post-Intervention 1: (Numerical value not provided)    Patients cultural, spiritual, Religion conflicts given the current situation: no    Objective:     Communicated with: RN prior to session.  Patient found HOB elevated with blood pressure cuff, pulse ox (continuous), telemetry, peripheral IV, nephrostomy, rebollar catheter, wound vac, PCA(G-tube) upon OT entry to room. Pt agreeable to participate in OT eval this date.    General Precautions: Standard, fall   Orthopedic Precautions:N/A   Braces: N/A     Occupational Performance:    Bed Mobility:    · Patient completed Rolling/Turning to Left with  contact guard assistance  · Patient completed Scooting to EOB with contact guard assistance  · Patient completed Supine to Sit with contact guard assistance    Functional Mobility/Transfers:  · Patient completed Sit <> Stand Transfer from EOB with contact guard assistance with no assistive device and hand-held assist   · Functional Mobility: Patient performed functional mobility task of household distance from EOB <> Bedside Chair Transfer using Step Transfer technique with minimum assistance with no assistive device and hand-held assist;    Activities of Daily Living:  · Grooming: contact guard assistance to perform oral care in stance  · Pt wiped face with wet towel with SBA while seated EOB  · Lower Body Dressing: total assistance not assessed, however, pt with new incision unable to perform forward flexion  · Toileting: total assistance rebollar catheter in place    Cognitive/Visual Perceptual:  Cognitive/Psychosocial Skills:     -       Oriented to: Person, Place, Time and Situation   -       Follows Commands/attention:Follows one-step commands  -       Communication: clear/fluent  -       Safety  awareness/insight to disability: intact   -       Mood/Affect/Coping skills/emotional control: Pleasant  Visual/Perceptual:      -Intact No reports of blurred/double vision    Physical Exam:  Balance:    -       Good seated EOB; Fair standing   Upper Extremity Range of Motion:     -       Right Upper Extremity: WFL  -       Left Upper Extremity: WFL  Upper Extremity Strength: -       Right Upper Extremity: Not formally assessed, pt performed bed mobility with pushing up  -       Left Upper Extremity: Not formally assessed, pt performed bed mobility with pushing up   Strength: -       Right Upper Extremity: Not formally assessed, pt performed gripping tasks during oral care  -       Left Upper Extremity: Not formally assessed, pt performed gripping tasks during oral care  Fine Motor Coordination:    -       Intact    AMPAC 6 Click ADL:  AMPAC Total Score: 15    Treatment & Education:  OT eval and treat, role reviewed and plan of care established. Pt tolerated session well, performing stance with CGA for ~8+ minutes to perform oral care in front of bed. Pt with increased fatigue with prolonged stance, required verbal cueing due to leaning back into bed with BLE. Required MIN A to performed functional mobility task. Pt educated on importance of continued OOB activity, pt expressed understanding but stating she may not be able to tolerate sitting UIC for 1-2 hours as advised.   Education:    Patient left up in chair with all lines intact and call button in reach    GOALS:   Multidisciplinary Problems     Occupational Therapy Goals        Problem: Occupational Therapy Goal    Goal Priority Disciplines Outcome Interventions   Occupational Therapy Goal     OT, PT/OT Ongoing, Progressing    Description:  Goals to be met by: 3/18/2020     Patient will increase functional independence with ADLs by performing:    UE Dressing with Stand-by Assistance.  LE Dressing with Stand-by Assistance.  Grooming while standing at sink  with Stand-by Assistance.  Toileting from toilet with Stand-by Assistance for hygiene and clothing management.   Upper extremity exercise program x10 reps per handout, with supervision.  Pt will perform functional mobility task of household and community distance with SBA using AD as needed.                    History:     Past Medical History:   Diagnosis Date    Arthritis     Biliary stricture     Cancer     Breast cancer    Cholangitis     Dilated bile duct     Duodenal stenosis     Jaundice     Pancreas cyst     Pancreatic mass     Protein calorie malnutrition     Thyroid disease     Ureteral stricture, right     Vitamin D deficiency     Weight loss, unintentional        Past Surgical History:   Procedure Laterality Date    ANTEGRADE NEPHROSTOGRAPHY Right 2/13/2020    Procedure: Nephrostogram - antegrade;  Surgeon: Moncho Rowell MD;  Location: [facility] OR [address] [place] [address];  Service: Urology;  Laterality: Right;    APPENDECTOMY      BACK SURGERY      BREAST SURGERY      lumpectomy    CHOLECYSTECTOMY      CLOSURE OF PERFORATED ULCER OF DUODENUM USING OMENTAL PATCH  3/10/2020    Procedure: CLOSURE, ULCER, PERFORATED, DUODENUM, USING OMENTAL PATCH;  Surgeon: Quang Wall MD;  Location: [facility] OR [address] [place] [address];  Service: General;;    CYSTOSCOPY W/ RETROGRADES Right 2/13/2020    Procedure: CYSTOSCOPY, WITH RETROGRADE PYELOGRAM;  Surgeon: Moncho Rowell MD;  Location: [facility] OR [address] [place] [address];  Service: Urology;  Laterality: Right;  1hr    ENDOSCOPIC ULTRASOUND OF UPPER GASTROINTESTINAL TRACT N/A 6/13/2019    Procedure: ULTRASOUND, UPPER GI TRACT, ENDOSCOPIC;  Surgeon: Jhonathan Abdul MD;  [facility]);  Service: Endoscopy;  Laterality: N/A;    ERCP N/A 6/13/2019    Procedure: ERCP (ENDOSCOPIC RETROGRADE CHOLANGIOPANCREATOGRAPHY);  Surgeon: Jhonathan Abdul MD;  Location: [facility] ENDO (2ND FLR);  Service: Endoscopy;  Laterality: N/A;    ERCP N/A 2/14/2020    Procedure: ERCP (ENDOSCOPIC  RETROGRADE CHOLANGIOPANCREATOGRAPHY);  Surgeon: Coleman Merritt MD;  Location: [facility] ENDO (2ND FLR);  Service: Endoscopy;  Laterality: N/A;    HYSTERECTOMY      LIVER BIOPSY  3/10/2020    Procedure: BIOPSY, LIVER;  Surgeon: Quang Wall MD;  Location: [facility] OR 2ND FLR;  Service: General;;    rectovaginal fistula repair      before 2010, from BRCA chemotherapy    SBR Right 10/09/2019    with open repair of R femoral hernia     SPINE SURGERY      URETEROSCOPY Right 2/13/2020    Procedure: URETEROSCOPY;  Surgeon: Moncho Rowell MD;  Location: [facility] OR 1ST FLR;  Service: Urology;  Laterality: Right;       Time Tracking:     OT Date of Treatment: 03/11/20  OT Start Time: 1058  OT Stop Time: 1131  OT Total Time (min): 33 min    Billable Minutes:Evaluation 15 minutes  Self Care/Home Management 18 minutes    MENDEZ Jensen  3/11/2020

## 2020-03-11 NOTE — PROGRESS NOTES
[facility]  Critical Care - Surgery  Progress Note    Patient Name: Julee Hawkins  MRN: [id number]  Admission Date: 3/10/2020  Hospital Length of Stay: 1 days  Code Status: Full Code  Attending Provider: Quang Wall MD  Primary Care Provider: Shalom Cerna MD   Principal Problem: Pneumoperitoneum    Subjective:     Hospital/ICU Course:  3/10/20: Approx 2 mm perforation the lateral aspect of the proximal duodenum overlying the indwelling duodenal stent. EGD. Closed primarily with omental patch. Liver nodule in segment III. Excisional biopsy. Peritoneal nodule x 2 sent as well. Open Jaime gastrostomy tube. Incisional wound VAC.    Interval History/Significant Events: NAEON. Received IVF this am for hypotension.    Follow-up For: Procedure(s) (LRB):  LAPAROTOMY, EXPLORATORY (N/A)  CLOSURE, ULCER, PERFORATED, DUODENUM, USING OMENTAL PATCH  BIOPSY, LIVER  INSERTION, PEG TUBE    Post-Operative Day: 1 Day Post-Op    Objective:     Vital Signs (Most Recent):  Temp: 97.6 °F (36.4 °C) (03/11/20 0300)  Pulse: 77 (03/11/20 0607)  Resp: 18 (03/11/20 0607)  BP: (!) 88/57 (03/11/20 0607)  SpO2: 98 % (03/11/20 0607) Vital Signs (24h Range):  Temp:  [97.5 °F (36.4 °C)-98.6 °F (37 °C)] 97.6 °F (36.4 °C)  Pulse:  [63-87] 77  Resp:  [10-33] 18  SpO2:  [95 %-100 %] 98 %  BP: ()/(53-82) 88/57     Weight: 47 kg (103 lb 9.9 oz)  Body mass index is 18.95 kg/m².      Intake/Output Summary (Last 24 hours) at 3/11/2020 0709  Last data filed at 3/11/2020 0701  Gross per 24 hour   Intake 4738.6 ml   Output 3060 ml   Net 1678.6 ml       Physical Exam   Constitutional: She is oriented to person, place, and time. She appears well-developed and well-nourished.   HENT:   Head: Normocephalic and atraumatic.   Eyes: Pupils are equal, round, and reactive to light. EOM are normal.   Neck: Normal range of motion.   Cardiovascular: Normal rate.   Abdominal: She exhibits distension. There is tenderness. There is  guarding.   RLQ tenderness. R nephro tube   Genitourinary:   Genitourinary Comments: Harmon in place   Musculoskeletal: Normal range of motion.   Neurological: She is alert and oriented to person, place, and time.   Skin: Skin is warm and dry.   Psychiatric: She has a normal mood and affect. Her behavior is normal.   Nursing note and vitals reviewed.      Vents:       Lines/Drains/Airways     Drain                 Nephrostomy Right -- days         Urethral Catheter 03/10/20 0529 Straight-tip 1 day         Gastrostomy/Enterostomy 03/10/20 1039 Gastrostomy-jejunostomy less than 1 day          Peripheral Intravenous Line                 Peripheral IV - Single Lumen 16 G Left Antecubital -- days         Peripheral IV - Single Lumen 20 G Left Wrist -- days         Peripheral IV - Single Lumen 03/10/20 0647 20 G Right Antecubital 1 day                Significant Labs:    CBC/Anemia Profile:  Recent Labs   Lab 03/10/20  0340 03/10/20  1133 03/11/20  0400   WBC 17.25* 16.14* 6.16   HGB 8.4* 9.1* 8.7*   HCT 25.1* 28.1* 27.2*    195 170   MCV 93 96 97   RDW 15.6* 16.3* 15.9*        Chemistries:  Recent Labs   Lab 03/10/20  0340 03/10/20  1300 03/10/20  2115 03/11/20  0400   * 124* 127* 133*   K 3.3* 3.9 4.1 4.2   CL 93* 97 99 103   CO2 20* 14* 19* 19*   BUN 37* 23 24* 24*   CREATININE 0.9 0.7 0.8 0.7   CALCIUM 8.0* 7.5* 8.2* 7.8*   ALBUMIN 1.9* 1.5*  --  1.7*   PROT 5.8* 4.8*  --  5.5*   BILITOT 3.5* 2.7*  --  2.1*   ALKPHOS 959* 730*  --  729*   ALT 52* 44  --  46*   AST 47* 63*  --  51*   MG 1.6 1.4* 3.1* 2.7*   PHOS 2.1* 2.1* 5.0* 4.5       ABGs: No results for input(s): PH, PCO2, HCO3, POCSATURATED, BE in the last 48 hours.  CMP:   Recent Labs   Lab 03/10/20  0340 03/10/20  1300 03/10/20  2115 03/11/20  0400   * 124* 127* 133*   K 3.3* 3.9 4.1 4.2   CL 93* 97 99 103   CO2 20* 14* 19* 19*   GLU 92 93 147* 128*   BUN 37* 23 24* 24*   CREATININE 0.9 0.7 0.8 0.7   CALCIUM 8.0* 7.5* 8.2* 7.8*   PROT 5.8* 4.8*   --  5.5*   ALBUMIN 1.9* 1.5*  --  1.7*   BILITOT 3.5* 2.7*  --  2.1*   ALKPHOS 959* 730*  --  729*   AST 47* 63*  --  51*   ALT 52* 44  --  46*   ANIONGAP 11 13 9 11   EGFRNONAA >60.0 >60.0 >60.0 >60.0     Coagulation:   Recent Labs   Lab 03/10/20  1133   INR 1.2   APTT 25.0     Lactic Acid:   Recent Labs   Lab 03/10/20  0340   LACTATE 0.6     Urine Studies:   Recent Labs   Lab 03/10/20  0440   COLORU Naz   APPEARANCEUA Clear   PHUR 5.0   SPECGRAV 1.030   PROTEINUA Negative   GLUCUA Negative   KETONESU Negative   BILIRUBINUA Negative   OCCULTUA 1+*   NITRITE Negative   LEUKOCYTESUR Negative   RBCUA 1   WBCUA 1   SQUAMEPITHEL 0     All pertinent labs within the past 24 hours have been reviewed.    Significant Imaging:  I have reviewed all pertinent imaging results/findings within the past 24 hours.    Assessment/Plan:     * Pneumoperitoneum  Julee Hawkins is a 74 y.o. female PMH pancreatic adenocarcinoma now with pneumoperitoneum.     Neuro:  Sedation: None  Pain control: Diluadid    Resp:  O2 sats stable on RA  No distress    CV:  HDS  No pressor requirement  No A line    Heme/ID:  H/H 8.7/27.2  WBC 6.16 (down from 16.14, consider lab error)  Vanc, Zosyn, fluconazle     Renal:  Harmon in place  Strict I/Os  BUN/Cr 23/0.5    FEN/GI:  NPO  Replace lytes PRN  Protonix BID    Endo:  SSI    PPX:  Protonix  Lovenox    Dispo: Continue ICU care          Critical care was time spent personally by me on the following activities: development of treatment plan with patient or surrogate and bedside caregivers, discussions with consultants, evaluation of patient's response to treatment, examination of patient, ordering and performing treatments and interventions, ordering and review of laboratory studies, ordering and review of radiographic studies, pulse oximetry, re-evaluation of patient's condition.  This critical care time did not overlap with that of any other provider or involve time for any procedures.     Jaimee  SEVEN Gibbons MD  Critical Care - Surgery  [facility]

## 2020-03-11 NOTE — PROGRESS NOTES
Therapy with vancomycin complete and/or consult discontinued by provider.  Pharmacy will sign off, please re-consult as needed.    Rebeca Schaffer, PharmD, BCCCP  [id number]

## 2020-03-11 NOTE — SUBJECTIVE & OBJECTIVE
Interval History/Significant Events: RACHAEL. Received IVF this am for hypotension.    Follow-up For: Procedure(s) (LRB):  LAPAROTOMY, EXPLORATORY (N/A)  CLOSURE, ULCER, PERFORATED, DUODENUM, USING OMENTAL PATCH  BIOPSY, LIVER  INSERTION, PEG TUBE    Post-Operative Day: 1 Day Post-Op    Objective:     Vital Signs (Most Recent):  Temp: 97.6 °F (36.4 °C) (03/11/20 0300)  Pulse: 77 (03/11/20 0607)  Resp: 18 (03/11/20 0607)  BP: (!) 88/57 (03/11/20 0607)  SpO2: 98 % (03/11/20 0607) Vital Signs (24h Range):  Temp:  [97.5 °F (36.4 °C)-98.6 °F (37 °C)] 97.6 °F (36.4 °C)  Pulse:  [63-87] 77  Resp:  [10-33] 18  SpO2:  [95 %-100 %] 98 %  BP: ()/(53-82) 88/57     Weight: 47 kg (103 lb 9.9 oz)  Body mass index is 18.95 kg/m².      Intake/Output Summary (Last 24 hours) at 3/11/2020 0709  Last data filed at 3/11/2020 0701  Gross per 24 hour   Intake 4738.6 ml   Output 3060 ml   Net 1678.6 ml       Physical Exam   Constitutional: She is oriented to person, place, and time. She appears well-developed and well-nourished.   HENT:   Head: Normocephalic and atraumatic.   Eyes: Pupils are equal, round, and reactive to light. EOM are normal.   Neck: Normal range of motion.   Cardiovascular: Normal rate.   Abdominal: She exhibits distension. There is tenderness. There is guarding.   RLQ tenderness. R nephro tube   Genitourinary:   Genitourinary Comments: Harmon in place   Musculoskeletal: Normal range of motion.   Neurological: She is alert and oriented to person, place, and time.   Skin: Skin is warm and dry.   Psychiatric: She has a normal mood and affect. Her behavior is normal.   Nursing note and vitals reviewed.      Vents:       Lines/Drains/Airways     Drain                 Nephrostomy Right -- days         Urethral Catheter 03/10/20 0529 Straight-tip 1 day         Gastrostomy/Enterostomy 03/10/20 1039 Gastrostomy-jejunostomy less than 1 day          Peripheral Intravenous Line                 Peripheral IV - Single Lumen 16 G  Left Antecubital -- days         Peripheral IV - Single Lumen 20 G Left Wrist -- days         Peripheral IV - Single Lumen 03/10/20 0647 20 G Right Antecubital 1 day                Significant Labs:    CBC/Anemia Profile:  Recent Labs   Lab 03/10/20  0340 03/10/20  1133 03/11/20  0400   WBC 17.25* 16.14* 6.16   HGB 8.4* 9.1* 8.7*   HCT 25.1* 28.1* 27.2*    195 170   MCV 93 96 97   RDW 15.6* 16.3* 15.9*        Chemistries:  Recent Labs   Lab 03/10/20  0340 03/10/20  1300 03/10/20  2115 03/11/20  0400   * 124* 127* 133*   K 3.3* 3.9 4.1 4.2   CL 93* 97 99 103   CO2 20* 14* 19* 19*   BUN 37* 23 24* 24*   CREATININE 0.9 0.7 0.8 0.7   CALCIUM 8.0* 7.5* 8.2* 7.8*   ALBUMIN 1.9* 1.5*  --  1.7*   PROT 5.8* 4.8*  --  5.5*   BILITOT 3.5* 2.7*  --  2.1*   ALKPHOS 959* 730*  --  729*   ALT 52* 44  --  46*   AST 47* 63*  --  51*   MG 1.6 1.4* 3.1* 2.7*   PHOS 2.1* 2.1* 5.0* 4.5       ABGs: No results for input(s): PH, PCO2, HCO3, POCSATURATED, BE in the last 48 hours.  CMP:   Recent Labs   Lab 03/10/20  0340 03/10/20  1300 03/10/20  2115 03/11/20  0400   * 124* 127* 133*   K 3.3* 3.9 4.1 4.2   CL 93* 97 99 103   CO2 20* 14* 19* 19*   GLU 92 93 147* 128*   BUN 37* 23 24* 24*   CREATININE 0.9 0.7 0.8 0.7   CALCIUM 8.0* 7.5* 8.2* 7.8*   PROT 5.8* 4.8*  --  5.5*   ALBUMIN 1.9* 1.5*  --  1.7*   BILITOT 3.5* 2.7*  --  2.1*   ALKPHOS 959* 730*  --  729*   AST 47* 63*  --  51*   ALT 52* 44  --  46*   ANIONGAP 11 13 9 11   EGFRNONAA >60.0 >60.0 >60.0 >60.0     Coagulation:   Recent Labs   Lab 03/10/20  1133   INR 1.2   APTT 25.0     Lactic Acid:   Recent Labs   Lab 03/10/20  0340   LACTATE 0.6     Urine Studies:   Recent Labs   Lab 03/10/20  0440   COLORU Naz   APPEARANCEUA Clear   PHUR 5.0   SPECGRAV 1.030   PROTEINUA Negative   GLUCUA Negative   KETONESU Negative   BILIRUBINUA Negative   OCCULTUA 1+*   NITRITE Negative   LEUKOCYTESUR Negative   RBCUA 1   WBCUA 1   SQUAMEPITHEL 0     All pertinent labs within the  past 24 hours have been reviewed.    Significant Imaging:  I have reviewed all pertinent imaging results/findings within the past 24 hours.

## 2020-03-11 NOTE — PLAN OF CARE
Dx: Pneumoperitoneum        Goals of Care: MAPs >65.      Neuro: AAO x4, Arouses to Voice, Follows Commands and Moves All Extremities     Vital Signs: Stable. SpO2 99% , afebrile     Respiratory: Room Air     Diet: NPO     Gtts: MIVF and PCA     Urine Output: Urinary Catheter >50cc/hour     Drains:   Nephrostomy  G-Tube to dependent drainage      Wound Vac @ 125 mmHg over abdominal incision, 0 cc output / shfit       Labs/Accuchecks: Accuchecks Q6H, Labs trended.     Skin: Wound vac over abdominal incision with seal intact. Gauze dressing over G-tube site with minimal sanguineous drainage, sutures intact. Foam dressings to sacrum and bilateral heels. No skin breakdown or pressure injuries noted.

## 2020-03-11 NOTE — PROGRESS NOTES
[facility]  General Surgery  Progress Note    Subjective:     History of Present Illness:  No notes on file    Post-Op Info:  Procedure(s) (LRB):  LAPAROTOMY, EXPLORATORY (N/A)  CLOSURE, ULCER, PERFORATED, DUODENUM, USING OMENTAL PATCH  BIOPSY, LIVER  INSERTION, PEG TUBE   1 Day Post-Op     Interval History: Brought emergently to OR yesterday AM and found to have duodenal perforation   No acute events overnight  Denies n/v  Appropriate abdominal pain    Medications:  Continuous Infusions:   sodium chloride 0.9% 125 mL/hr at 03/11/20 0600    hydromorphone in 0.9 % NaCl 6 mg/30 ml       Scheduled Meds:   enoxaparin  40 mg Subcutaneous Daily    fluconazole (DIFLUCAN) IVPB  200 mg Intravenous Q24H    pantoprazole  40 mg Intravenous BID    piperacillin-tazobactam (ZOSYN) IVPB  4.5 g Intravenous Q8H    sodium chloride 0.9%  1,000 mL Intravenous Once    vancomycin (VANCOCIN) IVPB  750 mg Intravenous Q24H     PRN Meds:calcium gluconate IVPB, calcium gluconate IVPB, calcium gluconate IVPB, Dextrose 10% Bolus, Dextrose 10% Bolus, glucagon (human recombinant), insulin aspart U-100, magnesium sulfate IVPB, magnesium sulfate IVPB, naloxone, ondansetron, potassium chloride in water **AND** potassium chloride in water **AND** potassium chloride in water, sodium chloride 0.9%, sodium phosphate IVPB, sodium phosphate IVPB, sodium phosphate IVPB     Review of patient's allergies indicates:   Allergen Reactions    Azithromycin Other (See Comments)     Stomach pain     Codeine Hallucinations     Objective:     Vital Signs (Most Recent):  Temp: 97.6 °F (36.4 °C) (03/11/20 0300)  Pulse: 77 (03/11/20 0607)  Resp: 18 (03/11/20 0607)  BP: (!) 88/57 (03/11/20 0607)  SpO2: 98 % (03/11/20 0607) Vital Signs (24h Range):  Temp:  [97.5 °F (36.4 °C)-98.6 °F (37 °C)] 97.6 °F (36.4 °C)  Pulse:  [63-87] 77  Resp:  [10-33] 18  SpO2:  [95 %-100 %] 98 %  BP: ()/(53-82) 88/57     Weight: 47 kg (103 lb 9.9 oz)  Body  mass index is 18.95 kg/m².    Intake/Output - Last 3 Shifts       03/09 0700 - 03/10 0659 03/10 0700 - 03/11 0659    I.V. (mL/kg)  3688.6 (78.5)    IV Piggyback  1050    Total Intake(mL/kg)  4738.6 (100.8)    Urine (mL/kg/hr) 350 2685 (2.4)    Drains  340    Other  0    Blood  35    Total Output 350 3060    Net -350 +1678.6                Physical Exam   Constitutional: She is oriented to person, place, and time.   Cardiovascular: Normal rate and regular rhythm.   Pulmonary/Chest: Effort normal. No respiratory distress.   Abdominal: Soft.   Midline incision c/d/i  R nephrostomy tube   Neurological: She is alert and oriented to person, place, and time.   Psychiatric: She has a normal mood and affect.   Nursing note and vitals reviewed.      Significant Labs:  Reviewed    Significant Diagnostics:  Reviewed    Assessment/Plan:     * Pneumoperitoneum  73 y/o F with pancreatic cancer with plans to undergo [facility] next week with right nephrectomy and caval reconstruction with Dr. Wall. Transferred in with pneumoperitoneum. S/p exlap with repair of duodenal perforation and peritoneal biopsies on 3/10.    NPO  PT/OT  Continue broad spectrum abx for duodenal perforation  mIVF  SCDs/lovenox/protonix    Dispo: stable to step down to MARYSE Parson MD  General Surgery  [facility]

## 2020-03-11 NOTE — PLAN OF CARE
SW is following this Pt for DC planning needs. PT/OT are recommending SNF as of 3/11. Patient has [facility] planned for next week. SHY needs TBD.     SW will continue to coordinate with patient, family, team and insurance to complete patient's discharge plan.    Zena Wilcox LMSW   - Case Management

## 2020-03-11 NOTE — PLAN OF CARE
OT eval and treat. Plan of care established.    Problem: Occupational Therapy Goal  Goal: Occupational Therapy Goal  Description  Goals to be met by: 3/18/2020     Patient will increase functional independence with ADLs by performing:    UE Dressing with Stand-by Assistance.  LE Dressing with Stand-by Assistance.  Grooming while standing at sink with Stand-by Assistance.  Toileting from toilet with Stand-by Assistance for hygiene and clothing management.   Upper extremity exercise program x10 reps per handout, with supervision.  Pt will perform functional mobility task of household and community distance with SBA using AD as needed.   Outcome: Ongoing, Progressing

## 2020-03-11 NOTE — SUBJECTIVE & OBJECTIVE
Interval History: Brought emergently to OR yesterday AM and found to have duodenal perforation   No acute events overnight  Denies n/v  Appropriate abdominal pain    Medications:  Continuous Infusions:   sodium chloride 0.9% 125 mL/hr at 03/11/20 0600    hydromorphone in 0.9 % NaCl 6 mg/30 ml       Scheduled Meds:   enoxaparin  40 mg Subcutaneous Daily    fluconazole (DIFLUCAN) IVPB  200 mg Intravenous Q24H    pantoprazole  40 mg Intravenous BID    piperacillin-tazobactam (ZOSYN) IVPB  4.5 g Intravenous Q8H    sodium chloride 0.9%  1,000 mL Intravenous Once    vancomycin (VANCOCIN) IVPB  750 mg Intravenous Q24H     PRN Meds:calcium gluconate IVPB, calcium gluconate IVPB, calcium gluconate IVPB, Dextrose 10% Bolus, Dextrose 10% Bolus, glucagon (human recombinant), insulin aspart U-100, magnesium sulfate IVPB, magnesium sulfate IVPB, naloxone, ondansetron, potassium chloride in water **AND** potassium chloride in water **AND** potassium chloride in water, sodium chloride 0.9%, sodium phosphate IVPB, sodium phosphate IVPB, sodium phosphate IVPB     Review of patient's allergies indicates:   Allergen Reactions    Azithromycin Other (See Comments)     Stomach pain     Codeine Hallucinations     Objective:     Vital Signs (Most Recent):  Temp: 97.6 °F (36.4 °C) (03/11/20 0300)  Pulse: 77 (03/11/20 0607)  Resp: 18 (03/11/20 0607)  BP: (!) 88/57 (03/11/20 0607)  SpO2: 98 % (03/11/20 0607) Vital Signs (24h Range):  Temp:  [97.5 °F (36.4 °C)-98.6 °F (37 °C)] 97.6 °F (36.4 °C)  Pulse:  [63-87] 77  Resp:  [10-33] 18  SpO2:  [95 %-100 %] 98 %  BP: ()/(53-82) 88/57     Weight: 47 kg (103 lb 9.9 oz)  Body mass index is 18.95 kg/m².    Intake/Output - Last 3 Shifts       03/09 0700 - 03/10 0659 03/10 0700 - 03/11 0659    I.V. (mL/kg)  3688.6 (78.5)    IV Piggyback  1050    Total Intake(mL/kg)  4738.6 (100.8)    Urine (mL/kg/hr) 350 2685 (2.4)    Drains  340    Other  0    Blood  35    Total Output 350 3060    Net  -350 +1678.6                Physical Exam   Constitutional: She is oriented to person, place, and time.   Cardiovascular: Normal rate and regular rhythm.   Pulmonary/Chest: Effort normal. No respiratory distress.   Abdominal: Soft.   Midline incision c/d/i  R nephrostomy tube   Neurological: She is alert and oriented to person, place, and time.   Psychiatric: She has a normal mood and affect.   Nursing note and vitals reviewed.      Significant Labs:  Reviewed    Significant Diagnostics:  Reviewed

## 2020-03-11 NOTE — PLAN OF CARE
Problem: Physical Therapy Goal  Goal: Physical Therapy Goal  Description  Goals to be met by: 3/25/20    Patient will increase functional independence with mobility by performin. Supine to sit with Stand-by Assistance -not met  2. Sit to stand transfer with Supervision -not met  3. Gait  x 150 feet with Contact Guard Assistance using AD if needed.. -not met  4. Ascend/descend 4 stair with no Handrails Contact Guard Assistance . -not met     Outcome: Ongoing, Progressing   Evaluation completed and goals appropriate. Nelida Thorne, PT  3/11/2020

## 2020-03-11 NOTE — ASSESSMENT & PLAN NOTE
Julee Hawkins is a 74 y.o. female PMH pancreatic adenocarcinoma now with pneumoperitoneum.     Neuro:  Sedation: None  Pain control: Diluadid    Resp:  O2 sats stable on RA  No distress    CV:  HDS  No pressor requirement  No A line    Heme/ID:  H/H 8.7/27.2  WBC 6.16 (down from 16.14, consider lab error)  Vanc, Zosyn, fluconazle     Renal:  Harmon in place  Strict I/Os  BUN/Cr 23/0.5    FEN/GI:  NPO  Replace lytes PRN  Protonix BID    Endo:  SSI    PPX:  Protonix  Lovenox    Dispo: Continue ICU care

## 2020-03-11 NOTE — ASSESSMENT & PLAN NOTE
73 y/o F with pancreatic cancer with plans to undergo Whipple next week with right nephrectomy and caval reconstruction with Dr. Wall. Transferred in with pneumoperitoneum. S/p exlap with repair of duodenal perforation and peritoneal biopsies on 3/10.    NPO  PT/OT  Continue broad spectrum abx for duodenal perforation  mIVF  SCDs/lovenox/protonix    Dispo: stable to step down to [facility]

## 2020-03-12 LAB
ALBUMIN SERPL BCP-MCNC: 1.7 G/DL (ref 3.5–5.2)
ALP SERPL-CCNC: 579 U/L (ref 55–135)
ALT SERPL W/O P-5'-P-CCNC: 38 U/L (ref 10–44)
ANION GAP SERPL CALC-SCNC: 7 MMOL/L (ref 8–16)
ANISOCYTOSIS BLD QL SMEAR: SLIGHT
AST SERPL-CCNC: 33 U/L (ref 10–40)
BASOPHILS # BLD AUTO: 0.02 K/UL (ref 0–0.2)
BASOPHILS NFR BLD: 0.3 % (ref 0–1.9)
BILIRUB SERPL-MCNC: 1.6 MG/DL (ref 0.1–1)
BUN SERPL-MCNC: 23 MG/DL (ref 8–23)
CALCIUM SERPL-MCNC: 8.4 MG/DL (ref 8.7–10.5)
CHLORIDE SERPL-SCNC: 109 MMOL/L (ref 95–110)
CO2 SERPL-SCNC: 20 MMOL/L (ref 23–29)
CREAT SERPL-MCNC: 0.8 MG/DL (ref 0.5–1.4)
DIFFERENTIAL METHOD: ABNORMAL
EOSINOPHIL # BLD AUTO: 0 K/UL (ref 0–0.5)
EOSINOPHIL NFR BLD: 0 % (ref 0–8)
ERYTHROCYTE [DISTWIDTH] IN BLOOD BY AUTOMATED COUNT: 16.2 % (ref 11.5–14.5)
EST. GFR  (AFRICAN AMERICAN): >60 ML/MIN/1.73 M^2
EST. GFR  (NON AFRICAN AMERICAN): >60 ML/MIN/1.73 M^2
GIANT PLATELETS BLD QL SMEAR: PRESENT
GLUCOSE SERPL-MCNC: 91 MG/DL (ref 70–110)
HCT VFR BLD AUTO: 27.8 % (ref 37–48.5)
HGB BLD-MCNC: 8.7 G/DL (ref 12–16)
IMM GRANULOCYTES # BLD AUTO: 0.16 K/UL (ref 0–0.04)
IMM GRANULOCYTES NFR BLD AUTO: 2 % (ref 0–0.5)
LYMPHOCYTES # BLD AUTO: 0.9 K/UL (ref 1–4.8)
LYMPHOCYTES NFR BLD: 10.9 % (ref 18–48)
MCH RBC QN AUTO: 30.7 PG (ref 27–31)
MCHC RBC AUTO-ENTMCNC: 31.3 G/DL (ref 32–36)
MCV RBC AUTO: 98 FL (ref 82–98)
MONOCYTES # BLD AUTO: 0.7 K/UL (ref 0.3–1)
MONOCYTES NFR BLD: 9.5 % (ref 4–15)
NEUTROPHILS # BLD AUTO: 6.1 K/UL (ref 1.8–7.7)
NEUTROPHILS NFR BLD: 77.3 % (ref 38–73)
NRBC BLD-RTO: 0 /100 WBC
PLATELET # BLD AUTO: 245 K/UL (ref 150–350)
PLATELET BLD QL SMEAR: ABNORMAL
PMV BLD AUTO: 10.1 FL (ref 9.2–12.9)
POCT GLUCOSE: 111 MG/DL (ref 70–110)
POCT GLUCOSE: 63 MG/DL (ref 70–110)
POCT GLUCOSE: 72 MG/DL (ref 70–110)
POCT GLUCOSE: 84 MG/DL (ref 70–110)
POCT GLUCOSE: 96 MG/DL (ref 70–110)
POTASSIUM SERPL-SCNC: 3.5 MMOL/L (ref 3.5–5.1)
PROT SERPL-MCNC: 5.2 G/DL (ref 6–8.4)
RBC # BLD AUTO: 2.83 M/UL (ref 4–5.4)
SODIUM SERPL-SCNC: 136 MMOL/L (ref 136–145)
WBC # BLD AUTO: 7.83 K/UL (ref 3.9–12.7)

## 2020-03-12 PROCEDURE — 63600175 PHARM REV CODE 636 W HCPCS: Performed by: STUDENT IN AN ORGANIZED HEALTH CARE EDUCATION/TRAINING PROGRAM

## 2020-03-12 PROCEDURE — 94770 HC EXHALED C02 TEST: CPT

## 2020-03-12 PROCEDURE — 25000242 PHARM REV CODE 250 ALT 637 W/ HCPCS: Performed by: NURSE PRACTITIONER

## 2020-03-12 PROCEDURE — 94799 UNLISTED PULMONARY SVC/PX: CPT

## 2020-03-12 PROCEDURE — 63600175 PHARM REV CODE 636 W HCPCS: Performed by: GENERAL PRACTICE

## 2020-03-12 PROCEDURE — 80053 COMPREHEN METABOLIC PANEL: CPT

## 2020-03-12 PROCEDURE — 30200315 PPD INTRADERMAL TEST REV CODE 302: Performed by: NURSE PRACTITIONER

## 2020-03-12 PROCEDURE — 94761 N-INVAS EAR/PLS OXIMETRY MLT: CPT

## 2020-03-12 PROCEDURE — 27000646 HC AEROBIKA DEVICE

## 2020-03-12 PROCEDURE — 94664 DEMO&/EVAL PT USE INHALER: CPT

## 2020-03-12 PROCEDURE — C9113 INJ PANTOPRAZOLE SODIUM, VIA: HCPCS | Performed by: STUDENT IN AN ORGANIZED HEALTH CARE EDUCATION/TRAINING PROGRAM

## 2020-03-12 PROCEDURE — 20600001 HC STEP DOWN PRIVATE ROOM

## 2020-03-12 PROCEDURE — 36415 COLL VENOUS BLD VENIPUNCTURE: CPT

## 2020-03-12 PROCEDURE — 86580 TB INTRADERMAL TEST: CPT | Performed by: NURSE PRACTITIONER

## 2020-03-12 PROCEDURE — 99900035 HC TECH TIME PER 15 MIN (STAT)

## 2020-03-12 PROCEDURE — 94640 AIRWAY INHALATION TREATMENT: CPT

## 2020-03-12 PROCEDURE — 85025 COMPLETE CBC W/AUTO DIFF WBC: CPT

## 2020-03-12 PROCEDURE — 25000003 PHARM REV CODE 250: Performed by: NURSE PRACTITIONER

## 2020-03-12 RX ORDER — LEVALBUTEROL INHALATION SOLUTION 0.63 MG/3ML
0.63 SOLUTION RESPIRATORY (INHALATION)
Status: DISCONTINUED | OUTPATIENT
Start: 2020-03-12 | End: 2020-03-18

## 2020-03-12 RX ORDER — POTASSIUM CHLORIDE 7.45 MG/ML
10 INJECTION INTRAVENOUS
Status: COMPLETED | OUTPATIENT
Start: 2020-03-12 | End: 2020-03-12

## 2020-03-12 RX ORDER — DEXTROSE MONOHYDRATE, SODIUM CHLORIDE, AND POTASSIUM CHLORIDE 50; 1.49; 4.5 G/1000ML; G/1000ML; G/1000ML
INJECTION, SOLUTION INTRAVENOUS CONTINUOUS
Status: DISCONTINUED | OUTPATIENT
Start: 2020-03-12 | End: 2020-03-18

## 2020-03-12 RX ADMIN — PIPERACILLIN SODIUM AND TAZOBACTAM SODIUM 4.5 G: 4; .5 INJECTION, POWDER, LYOPHILIZED, FOR SOLUTION INTRAVENOUS at 12:03

## 2020-03-12 RX ADMIN — POTASSIUM CHLORIDE 10 MEQ: 7.46 INJECTION, SOLUTION INTRAVENOUS at 01:03

## 2020-03-12 RX ADMIN — DEXTROSE MONOHYDRATE, SODIUM CHLORIDE, AND POTASSIUM CHLORIDE: 50; 4.5; 1.49 INJECTION, SOLUTION INTRAVENOUS at 12:03

## 2020-03-12 RX ADMIN — SODIUM CHLORIDE: 0.9 INJECTION, SOLUTION INTRAVENOUS at 05:03

## 2020-03-12 RX ADMIN — PIPERACILLIN SODIUM AND TAZOBACTAM SODIUM 4.5 G: 4; .5 INJECTION, POWDER, LYOPHILIZED, FOR SOLUTION INTRAVENOUS at 05:03

## 2020-03-12 RX ADMIN — Medication: at 12:03

## 2020-03-12 RX ADMIN — TUBERCULIN PURIFIED PROTEIN DERIVATIVE 5 UNITS: 5 INJECTION, SOLUTION INTRADERMAL at 01:03

## 2020-03-12 RX ADMIN — PANTOPRAZOLE SODIUM 40 MG: 40 INJECTION, POWDER, FOR SOLUTION INTRAVENOUS at 08:03

## 2020-03-12 RX ADMIN — POTASSIUM CHLORIDE 10 MEQ: 7.46 INJECTION, SOLUTION INTRAVENOUS at 12:03

## 2020-03-12 RX ADMIN — LEVALBUTEROL HYDROCHLORIDE 0.63 MG: 0.63 SOLUTION RESPIRATORY (INHALATION) at 02:03

## 2020-03-12 RX ADMIN — POTASSIUM CHLORIDE 10 MEQ: 7.46 INJECTION, SOLUTION INTRAVENOUS at 11:03

## 2020-03-12 RX ADMIN — PIPERACILLIN SODIUM AND TAZOBACTAM SODIUM 4.5 G: 4; .5 INJECTION, POWDER, LYOPHILIZED, FOR SOLUTION INTRAVENOUS at 09:03

## 2020-03-12 RX ADMIN — LEVALBUTEROL HYDROCHLORIDE 0.63 MG: 0.63 SOLUTION RESPIRATORY (INHALATION) at 09:03

## 2020-03-12 RX ADMIN — PANTOPRAZOLE SODIUM 40 MG: 40 INJECTION, POWDER, FOR SOLUTION INTRAVENOUS at 09:03

## 2020-03-12 RX ADMIN — ENOXAPARIN SODIUM 40 MG: 100 INJECTION SUBCUTANEOUS at 05:03

## 2020-03-12 RX ADMIN — FLUCONAZOLE 200 MG: 2 INJECTION, SOLUTION INTRAVENOUS at 09:03

## 2020-03-12 NOTE — NURSING
ANTONY. VSS. Pain controlled w/ PCA. G-J tube to gravity, patent. Nephrostomy WDL. Harmon patent, adequate UOP. ML to WV, no output.

## 2020-03-12 NOTE — PLAN OF CARE
PCP MICHELLE TREVIZO [place]  [facility] ([facility])  [facility] [place]  [address]  [phone] [facility] [place] (PT REQUESTS THE NURSE RADHA)  PT HAS TRANSPORTATION HOME     03/12/20 1024   Discharge Assessment   Assessment Type Discharge Planning Assessment   Confirmed/corrected address and phone number on facesheet? Yes   Assessment information obtained from? Patient   Expected Length of Stay (days) 4   Communicated expected length of stay with patient/caregiver no   Prior to hospitilization cognitive status: Alert/Oriented   Prior to hospitalization functional status: Assistive Equipment   Current cognitive status: Alert/Oriented   Current Functional Status: Assistive Equipment   Lives With spouse   Able to Return to Prior Arrangements yes   Is patient able to care for self after discharge? Yes   Patient's perception of discharge disposition home or selfcare;home health   Patient currently being followed by outpatient case management? No   Patient currently receives any other outside agency services? Yes   Is it the patient/care giver preference to resume care with the current outside agency? Yes   Equipment Currently Used at Home walker, antione   Do you have any problems affording any of your prescribed medications? No   Is the patient taking medications as prescribed? yes   Does the patient have transportation home? Yes   Transportation Anticipated family or friend will provide   Does the patient receive services at the [facility]? No   Discharge Plan A Home with family;Home Health   Discharge Plan B Home;Home with family;Home Health   DME Needed Upon Discharge  none   Patient/Family in Agreement with Plan unable to assess

## 2020-03-12 NOTE — ASSESSMENT & PLAN NOTE
73 y/o F with pancreatic cancer with plans to undergo Whipple next week with right nephrectomy and caval reconstruction with Dr. Wall. Transferred in with pneumoperitoneum. S/p exlap with repair of duodenal perforation and peritoneal biopsies on 3/10.    NPO  G-tube to gravity  PT/OT; OOB to chair  Continue broad spectrum abx for duodenal perforation  mIVF  SCDs/lovenox/protonix  D/c rebollar    Dispo: Continue [facility] care

## 2020-03-12 NOTE — SUBJECTIVE & OBJECTIVE
Interval History: No acute events overnight  Moderate g-tube output  Pain moderately controlled  Denies n/v    Medications:  Continuous Infusions:   sodium chloride 0.9% 125 mL/hr at 03/12/20 0513    hydromorphone in 0.9 % NaCl 6 mg/30 ml       Scheduled Meds:   enoxaparin  40 mg Subcutaneous Daily    fluconazole (DIFLUCAN) IVPB  200 mg Intravenous Q24H    [START ON 3/13/2020] levothyroxine  25 mcg Intravenous Daily    pantoprazole  40 mg Intravenous BID    piperacillin-tazobactam (ZOSYN) IVPB  4.5 g Intravenous Q8H     PRN Meds:Dextrose 10% Bolus, Dextrose 10% Bolus, glucagon (human recombinant), insulin aspart U-100, naloxone, ondansetron, sodium chloride 0.9%     Review of patient's allergies indicates:   Allergen Reactions    Azithromycin Other (See Comments)     Stomach pain     Codeine Hallucinations     Objective:     Vital Signs (Most Recent):  Temp: 97.4 °F (36.3 °C) (03/12/20 0510)  Pulse: 75 (03/12/20 0756)  Resp: 18 (03/12/20 0756)  BP: 97/60 (03/12/20 0756)  SpO2: (!) 93 % (03/12/20 0756) Vital Signs (24h Range):  Temp:  [96 °F (35.6 °C)-97.7 °F (36.5 °C)] 97.4 °F (36.3 °C)  Pulse:  [71-87] 75  Resp:  [8-37] 18  SpO2:  [93 %-100 %] 93 %  BP: ()/(55-69) 97/60     Weight: 47 kg (103 lb 9.9 oz)  Body mass index is 18.95 kg/m².    Intake/Output - Last 3 Shifts       03/10 0700 - 03/11 0659 03/11 0700 - 03/12 0659 03/12 0700 - 03/13 0659    I.V. (mL/kg) 3688.6 (78.5) 2459 (52.3)     IV Piggyback 2050 800     Total Intake(mL/kg) 5738.6 (122.1) 3259 (69.3)     Urine (mL/kg/hr) 2685 (2.4) 1165 (1)     Drains 340 595     Other 0 0     Stool  0     Blood 35      Total Output 3060 1760     Net +2678.6 +1499            Stool Occurrence  0 x           Physical Exam   Constitutional: She is oriented to person, place, and time.   Cardiovascular: Normal rate and regular rhythm.   Pulmonary/Chest: Effort normal. No respiratory distress.   Abdominal: Soft.   Midline incision c/d/i  R nephrostomy tube    Neurological: She is alert and oriented to person, place, and time.   Psychiatric: She has a normal mood and affect.   Nursing note and vitals reviewed.      Significant Labs:  Reviewed    Significant Diagnostics:  Reviewed

## 2020-03-12 NOTE — PLAN OF CARE
CM met with the patient and  to give a SNF list at this time CM asked if BC/BS was the primary insurance Medicare is the patients' primary insurance   CM notified admitting and aske dthat admitting correct the patients status   CM will contine to follow

## 2020-03-12 NOTE — HPI
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  Pt has been having abdominal pain since Saturday.  Transferred after CT findings.  Reportedly had been responding to neoadjuvant chemo based on last PET

## 2020-03-12 NOTE — PLAN OF CARE
Patient A/O x4. NPO. Patient on PCA pump. Nephrostomy tube draining. Wound vac in place. Patient made comfortably. Call light placed in reach. Hourly rounding done. Will continue to monitor.

## 2020-03-12 NOTE — CARE UPDATE
Rapid Response Respiratory Therapy ETCO2 Check     Time of visit: 11:34     Code Status: Full Code   : 1945  Bed: 2/2 A:   MRN: [id number]    SITUATION     Evaluated patient for: ETCO2 Compliance     BACKGROUND     Patient has a past medical history of Arthritis, Biliary stricture, Cancer, Cholangitis, Dilated bile duct, Duodenal stenosis, Jaundice, Pancreas cyst, Pancreatic mass, Protein calorie malnutrition, Thyroid disease, Ureteral stricture, right, Vitamin D deficiency, and Weight loss, unintentional.    ASSESSMENT     Is the ETCO2 monitor on? (Yes/No)  No, etco2 monitor turned on upon assessment  Is the patient wearing a cannula? (Yes/No) No, etco2 cannula placed on patient  Are ETCO2 orders placed? (Yes/No) Yes  Is the patient on a PCA pump? (Yes/No) Yes  ETCO2 monitored: ETCO2 (mmHg): 35 mmHg  O2 Device/Concentration: room air  Pulse: 71 Respiratory rate: 14 Temperature: Temp: 97.2 °F (36.2 °C) BP: BP: 113/71 SpO2: 98%  Level of Consciousness: Level of Consciousness (AVPU): alert  All Lung Field Breath Sounds: All Lung Fields Breath Sounds: Anterior:, Lateral:, clear, equal bilaterally  Ambu at bedside: Ambu bag with the patient?: Yes, Adult Ambu    INTERVENTIONS/RECOMMENDATIONS     Continue current POC.     PHYSICIAN ESCALATION     Physician Escalation (Yes/No): No        FOLLOW-UP     Please call back the Rapid Response RT, Dinorah Cárdenas, CRT at x [phone] for any questions or concerns.

## 2020-03-12 NOTE — PROGRESS NOTES
[facility]  General Surgery  Progress Note    Subjective:     History of Present Illness:  No notes on file    Post-Op Info:  Procedure(s) (LRB):  LAPAROTOMY, EXPLORATORY (N/A)  CLOSURE, ULCER, PERFORATED, DUODENUM, USING OMENTAL PATCH  BIOPSY, LIVER  INSERTION, PEG TUBE   2 Days Post-Op     Interval History: No acute events overnight  Moderate g-tube output  Pain moderately controlled  Denies n/v    Medications:  Continuous Infusions:   sodium chloride 0.9% 125 mL/hr at 03/12/20 0513    hydromorphone in 0.9 % NaCl 6 mg/30 ml       Scheduled Meds:   enoxaparin  40 mg Subcutaneous Daily    fluconazole (DIFLUCAN) IVPB  200 mg Intravenous Q24H    [START ON 3/13/2020] levothyroxine  25 mcg Intravenous Daily    pantoprazole  40 mg Intravenous BID    piperacillin-tazobactam (ZOSYN) IVPB  4.5 g Intravenous Q8H     PRN Meds:Dextrose 10% Bolus, Dextrose 10% Bolus, glucagon (human recombinant), insulin aspart U-100, naloxone, ondansetron, sodium chloride 0.9%     Review of patient's allergies indicates:   Allergen Reactions    Azithromycin Other (See Comments)     Stomach pain     Codeine Hallucinations     Objective:     Vital Signs (Most Recent):  Temp: 97.4 °F (36.3 °C) (03/12/20 0510)  Pulse: 75 (03/12/20 0756)  Resp: 18 (03/12/20 0756)  BP: 97/60 (03/12/20 0756)  SpO2: (!) 93 % (03/12/20 0756) Vital Signs (24h Range):  Temp:  [96 °F (35.6 °C)-97.7 °F (36.5 °C)] 97.4 °F (36.3 °C)  Pulse:  [71-87] 75  Resp:  [8-37] 18  SpO2:  [93 %-100 %] 93 %  BP: ()/(55-69) 97/60     Weight: 47 kg (103 lb 9.9 oz)  Body mass index is 18.95 kg/m².    Intake/Output - Last 3 Shifts       03/10 0700 - 03/11 0659 03/11 0700 - 03/12 0659 03/12 0700 - 03/13 0659    I.V. (mL/kg) 3688.6 (78.5) 2459 (52.3)     IV Piggyback 2050 800     Total Intake(mL/kg) 5738.6 (122.1) 3259 (69.3)     Urine (mL/kg/hr) 2685 (2.4) 1165 (1)     Drains 340 595     Other 0 0     Stool  0     Blood 35      Total Output 3060 1760      Net +2678.6 +1499            Stool Occurrence  0 x           Physical Exam   Constitutional: She is oriented to person, place, and time.   Cardiovascular: Normal rate and regular rhythm.   Pulmonary/Chest: Effort normal. No respiratory distress.   Abdominal: Soft.   Midline incision c/d/i  R nephrostomy tube   Neurological: She is alert and oriented to person, place, and time.   Psychiatric: She has a normal mood and affect.   Nursing note and vitals reviewed.      Significant Labs:  Reviewed    Significant Diagnostics:  Reviewed    Assessment/Plan:     * Pneumoperitoneum  73 y/o F with pancreatic cancer with plans to undergo Whipple next week with right nephrectomy and caval reconstruction with Dr. Wall. Transferred in with pneumoperitoneum. S/p exlap with repair of duodenal perforation and peritoneal biopsies on 3/10.    NPO  G-tube to gravity  PT/OT; OOB to chair  Continue broad spectrum abx for duodenal perforation  mIVF  SCDs/lovenox/protonix  D/c rebollar  Replace potassium for hypokalemia    Dispo: Continue [facility] care        Leland Parson MD  General Surgery  [facility]

## 2020-03-13 LAB
ALBUMIN SERPL BCP-MCNC: 1.9 G/DL (ref 3.5–5.2)
ALP SERPL-CCNC: 548 U/L (ref 55–135)
ALT SERPL W/O P-5'-P-CCNC: 34 U/L (ref 10–44)
ANION GAP SERPL CALC-SCNC: 9 MMOL/L (ref 8–16)
AST SERPL-CCNC: 29 U/L (ref 10–40)
BASOPHILS # BLD AUTO: 0.02 K/UL (ref 0–0.2)
BASOPHILS NFR BLD: 0.3 % (ref 0–1.9)
BILIRUB SERPL-MCNC: 2 MG/DL (ref 0.1–1)
BUN SERPL-MCNC: 17 MG/DL (ref 8–23)
CALCIUM SERPL-MCNC: 8.6 MG/DL (ref 8.7–10.5)
CHLORIDE SERPL-SCNC: 107 MMOL/L (ref 95–110)
CO2 SERPL-SCNC: 19 MMOL/L (ref 23–29)
CREAT SERPL-MCNC: 0.7 MG/DL (ref 0.5–1.4)
DIFFERENTIAL METHOD: ABNORMAL
EOSINOPHIL # BLD AUTO: 0.1 K/UL (ref 0–0.5)
EOSINOPHIL NFR BLD: 1.5 % (ref 0–8)
ERYTHROCYTE [DISTWIDTH] IN BLOOD BY AUTOMATED COUNT: 16 % (ref 11.5–14.5)
EST. GFR  (AFRICAN AMERICAN): >60 ML/MIN/1.73 M^2
EST. GFR  (NON AFRICAN AMERICAN): >60 ML/MIN/1.73 M^2
GLUCOSE SERPL-MCNC: 98 MG/DL (ref 70–110)
HCT VFR BLD AUTO: 27.1 % (ref 37–48.5)
HGB BLD-MCNC: 8.6 G/DL (ref 12–16)
IMM GRANULOCYTES # BLD AUTO: 0.06 K/UL (ref 0–0.04)
IMM GRANULOCYTES NFR BLD AUTO: 0.9 % (ref 0–0.5)
LYMPHOCYTES # BLD AUTO: 0.9 K/UL (ref 1–4.8)
LYMPHOCYTES NFR BLD: 13 % (ref 18–48)
MAGNESIUM SERPL-MCNC: 1.4 MG/DL (ref 1.6–2.6)
MCH RBC QN AUTO: 30.9 PG (ref 27–31)
MCHC RBC AUTO-ENTMCNC: 31.7 G/DL (ref 32–36)
MCV RBC AUTO: 98 FL (ref 82–98)
MONOCYTES # BLD AUTO: 0.6 K/UL (ref 0.3–1)
MONOCYTES NFR BLD: 9.2 % (ref 4–15)
NEUTROPHILS # BLD AUTO: 5.2 K/UL (ref 1.8–7.7)
NEUTROPHILS NFR BLD: 75.1 % (ref 38–73)
NRBC BLD-RTO: 0 /100 WBC
PHOSPHATE SERPL-MCNC: 2.2 MG/DL (ref 2.7–4.5)
PLATELET # BLD AUTO: 271 K/UL (ref 150–350)
PMV BLD AUTO: 9.8 FL (ref 9.2–12.9)
POCT GLUCOSE: 104 MG/DL (ref 70–110)
POCT GLUCOSE: 99 MG/DL (ref 70–110)
POTASSIUM SERPL-SCNC: 3.7 MMOL/L (ref 3.5–5.1)
PROT SERPL-MCNC: 5.4 G/DL (ref 6–8.4)
RBC # BLD AUTO: 2.78 M/UL (ref 4–5.4)
SODIUM SERPL-SCNC: 135 MMOL/L (ref 136–145)
WBC # BLD AUTO: 6.86 K/UL (ref 3.9–12.7)

## 2020-03-13 PROCEDURE — 97535 SELF CARE MNGMENT TRAINING: CPT

## 2020-03-13 PROCEDURE — 25000242 PHARM REV CODE 250 ALT 637 W/ HCPCS: Performed by: NURSE PRACTITIONER

## 2020-03-13 PROCEDURE — 94640 AIRWAY INHALATION TREATMENT: CPT

## 2020-03-13 PROCEDURE — 25000003 PHARM REV CODE 250: Performed by: NURSE PRACTITIONER

## 2020-03-13 PROCEDURE — 94799 UNLISTED PULMONARY SVC/PX: CPT

## 2020-03-13 PROCEDURE — 25500020 PHARM REV CODE 255: Performed by: SURGERY

## 2020-03-13 PROCEDURE — 83735 ASSAY OF MAGNESIUM: CPT

## 2020-03-13 PROCEDURE — C9113 INJ PANTOPRAZOLE SODIUM, VIA: HCPCS | Performed by: STUDENT IN AN ORGANIZED HEALTH CARE EDUCATION/TRAINING PROGRAM

## 2020-03-13 PROCEDURE — 80053 COMPREHEN METABOLIC PANEL: CPT

## 2020-03-13 PROCEDURE — 20600001 HC STEP DOWN PRIVATE ROOM

## 2020-03-13 PROCEDURE — 63600175 PHARM REV CODE 636 W HCPCS: Performed by: STUDENT IN AN ORGANIZED HEALTH CARE EDUCATION/TRAINING PROGRAM

## 2020-03-13 PROCEDURE — 25000003 PHARM REV CODE 250: Performed by: STUDENT IN AN ORGANIZED HEALTH CARE EDUCATION/TRAINING PROGRAM

## 2020-03-13 PROCEDURE — 94770 HC EXHALED C02 TEST: CPT

## 2020-03-13 PROCEDURE — 94664 DEMO&/EVAL PT USE INHALER: CPT

## 2020-03-13 PROCEDURE — 84100 ASSAY OF PHOSPHORUS: CPT

## 2020-03-13 PROCEDURE — 63600175 PHARM REV CODE 636 W HCPCS: Performed by: NURSE PRACTITIONER

## 2020-03-13 PROCEDURE — 85025 COMPLETE CBC W/AUTO DIFF WBC: CPT

## 2020-03-13 PROCEDURE — 97116 GAIT TRAINING THERAPY: CPT

## 2020-03-13 PROCEDURE — 99900035 HC TECH TIME PER 15 MIN (STAT)

## 2020-03-13 PROCEDURE — 94761 N-INVAS EAR/PLS OXIMETRY MLT: CPT

## 2020-03-13 PROCEDURE — 97530 THERAPEUTIC ACTIVITIES: CPT

## 2020-03-13 RX ORDER — MAGNESIUM SULFATE HEPTAHYDRATE 40 MG/ML
2 INJECTION, SOLUTION INTRAVENOUS ONCE
Status: COMPLETED | OUTPATIENT
Start: 2020-03-13 | End: 2020-03-13

## 2020-03-13 RX ADMIN — PIPERACILLIN SODIUM AND TAZOBACTAM SODIUM 4.5 G: 4; .5 INJECTION, POWDER, LYOPHILIZED, FOR SOLUTION INTRAVENOUS at 11:03

## 2020-03-13 RX ADMIN — MAGNESIUM SULFATE IN WATER 2 G: 40 INJECTION, SOLUTION INTRAVENOUS at 01:03

## 2020-03-13 RX ADMIN — FLUCONAZOLE 200 MG: 2 INJECTION, SOLUTION INTRAVENOUS at 09:03

## 2020-03-13 RX ADMIN — POTASSIUM PHOSPHATE, MONOBASIC AND POTASSIUM PHOSPHATE, DIBASIC 30 MMOL: 224; 236 INJECTION, SOLUTION, CONCENTRATE INTRAVENOUS at 06:03

## 2020-03-13 RX ADMIN — PIPERACILLIN SODIUM AND TAZOBACTAM SODIUM 4.5 G: 4; .5 INJECTION, POWDER, LYOPHILIZED, FOR SOLUTION INTRAVENOUS at 06:03

## 2020-03-13 RX ADMIN — ENOXAPARIN SODIUM 40 MG: 100 INJECTION SUBCUTANEOUS at 06:03

## 2020-03-13 RX ADMIN — LEVALBUTEROL HYDROCHLORIDE 0.63 MG: 0.63 SOLUTION RESPIRATORY (INHALATION) at 10:03

## 2020-03-13 RX ADMIN — IOHEXOL 140 ML: 350 INJECTION, SOLUTION INTRAVENOUS at 04:03

## 2020-03-13 RX ADMIN — LEVALBUTEROL HYDROCHLORIDE 0.63 MG: 0.63 SOLUTION RESPIRATORY (INHALATION) at 02:03

## 2020-03-13 RX ADMIN — DEXTROSE MONOHYDRATE, SODIUM CHLORIDE, AND POTASSIUM CHLORIDE: 50; 4.5; 1.49 INJECTION, SOLUTION INTRAVENOUS at 02:03

## 2020-03-13 RX ADMIN — PANTOPRAZOLE SODIUM 40 MG: 40 INJECTION, POWDER, FOR SOLUTION INTRAVENOUS at 09:03

## 2020-03-13 RX ADMIN — PIPERACILLIN SODIUM AND TAZOBACTAM SODIUM 4.5 G: 4; .5 INJECTION, POWDER, LYOPHILIZED, FOR SOLUTION INTRAVENOUS at 09:03

## 2020-03-13 RX ADMIN — LEVOTHYROXINE SODIUM ANHYDROUS 25 MCG: 100 INJECTION, POWDER, LYOPHILIZED, FOR SOLUTION INTRAVENOUS at 09:03

## 2020-03-13 RX ADMIN — Medication: at 06:03

## 2020-03-13 RX ADMIN — Medication: at 10:03

## 2020-03-13 RX ADMIN — LEVALBUTEROL HYDROCHLORIDE 0.63 MG: 0.63 SOLUTION RESPIRATORY (INHALATION) at 08:03

## 2020-03-13 RX ADMIN — PIPERACILLIN SODIUM AND TAZOBACTAM SODIUM 4.5 G: 4; .5 INJECTION, POWDER, LYOPHILIZED, FOR SOLUTION INTRAVENOUS at 12:03

## 2020-03-13 NOTE — PT/OT/SLP PROGRESS
"Physical Therapy Treatment    Patient Name:  Julee Hawkins   MRN:  [id number]    Recommendations:     Discharge Recommendations:  nursing facility, skilled   Discharge Equipment Recommendations: walker, rolling, bedside commode, bath bench   Barriers to discharge: decreased functional mobility    Assessment:     Julee Hawkins is a 74 y.o. female admitted with a medical diagnosis of Pneumoperitoneum.  She presents with the following impairments/functional limitations:  weakness, impaired self care skills, impaired balance, impaired functional mobilty, impaired endurance, gait instability, impaired cardiopulmonary response to activity.  Tolerated session c c/o weakness.  Demo improved functional mobility - increased gait distance.  Performed mobility c CGA.  Pt able to amb short distance in room c RW and demo decreased gait speed, shuffled gait pattern, FFP, narrow LESLIE, labored breathing and c/o fatigue.  Pt safe to amb c assistance of 1x person. Pt able to stand at sink [id number] for self-care on this date requiring SBA-CGA.  Pt would benefit from continued skilled acute PT 4x/wk to improve functional mobility.      Rehab Prognosis: Good; patient would benefit from acute skilled PT services to address these deficits and reach maximum level of function.    Recent Surgery: Procedure(s) (LRB):  LAPAROTOMY, EXPLORATORY (N/A)  CLOSURE, ULCER, PERFORATED, DUODENUM, USING OMENTAL PATCH  BIOPSY, LIVER  INSERTION, PEG TUBE 3 Days Post-Op    Plan:     During this hospitalization, patient to be seen 4 x/week to address the identified rehab impairments via gait training, therapeutic activities, therapeutic exercises and progress toward the following goals:    · Plan of Care Expires:  04/09/20    Subjective     Chief Complaint: fatigue  Patient/Family Comments/goals: "Let's try to walk." when asked if pt needed seated break after extended time standing at sink  Pain/Comfort:  · Pain Rating 1: (reports abdominal " pain)      Objective:     Communicated with RN prior to session.  Patient found HOB elevated with peripheral IV, telemetry, wound vac, PICC line, SCD upon PT entry to room.     General Precautions: Standard, fall   Orthopedic Precautions:N/A   Braces: N/A     Functional Mobility:  · Bed Mobility:     · Rolling Left:  contact guard assistance  · Scooting: contact guard assistance  · Supine to Sit: contact guard assistance  · Transfers:     · Sit to Stand:  contact guard assistance with no AD  · Gait: 15ft x2 c RW CGA   · demo decreased gait speed, shuffled gait pattern, FFP, narrow LESLIE, labored breathing and c/o fatigue  · Balance: sitting (S); standing (CGA)      AM-PAC 6 CLICK MOBILITY  Turning over in bed (including adjusting bedclothes, sheets and blankets)?: 3  Sitting down on and standing up from a chair with arms (e.g., wheelchair, bedside commode, etc.): 3  Moving from lying on back to sitting on the side of the bed?: 3  Moving to and from a bed to a chair (including a wheelchair)?: 3  Need to walk in hospital room?: 3  Climbing 3-5 steps with a railing?: 2  Basic Mobility Total Score: 17       Therapeutic Activities and Exercises:  Pt educated on: PT role/POC; safety c mobility; benefits of OOB activities; performing therex; d/c recs - v/u  -sat EOB x4mins  -sit<>stand from bed 3x  -standing at sink [id number]  -gait training provided for proper RW management, increased upright posture and to increased heel strike for improved mechanics  -repositioned in chair for comfort      Patient left up in chair with all lines intact, call button in reach, RN notified and  present..    GOALS:   Multidisciplinary Problems     Physical Therapy Goals        Problem: Physical Therapy Goal    Goal Priority Disciplines Outcome Goal Variances Interventions   Physical Therapy Goal     PT, PT/OT Ongoing, Progressing     Description:  Goals to be met by: 3/25/20    Patient will increase functional independence with mobility  by performin. Supine to sit with Stand-by Assistance -not met  2. Sit to stand transfer with Supervision -not met  3. Gait  x 150 feet with Contact Guard Assistance using AD if needed.. -not met  4. Ascend/descend 4 stair with no Handrails Contact Guard Assistance . -not met                      Time Tracking:     PT Received On: 20  PT Start Time: 926     PT Stop Time: 1004  PT Total Time (min): 38 min     Billable Minutes: Gait Training 10 min and Therapeutic Activity 28 min    Treatment Type: Treatment  PT/PTA: PT     PTA Visit Number: 0     Kuldeep Murrieta, PT  2020

## 2020-03-13 NOTE — SUBJECTIVE & OBJECTIVE
Interval History: No acute events overnight.  g-tube output = 300cc yesterday.   Pain moderately controlled.  Denies n/v.    Medications:  Continuous Infusions:   dextrose 5 % and 0.45 % NaCl with KCl 20 mEq 80 mL/hr at 03/13/20 0228    hydromorphone in 0.9 % NaCl 6 mg/30 ml       Scheduled Meds:   enoxaparin  40 mg Subcutaneous Daily    fluconazole (DIFLUCAN) IVPB  200 mg Intravenous Q24H    levalbuterol  0.63 mg Nebulization Q6H WAKE    levothyroxine  25 mcg Intravenous Daily    pantoprazole  40 mg Intravenous BID    piperacillin-tazobactam (ZOSYN) IVPB  4.5 g Intravenous Q8H     PRN Meds:Dextrose 10% Bolus, Dextrose 10% Bolus, glucagon (human recombinant), influenza, insulin aspart U-100, naloxone, ondansetron, sodium chloride 0.9%     Review of patient's allergies indicates:   Allergen Reactions    Azithromycin Other (See Comments)     Stomach pain     Codeine Hallucinations     Objective:     Vital Signs (Most Recent):  Temp: 97.5 °F (36.4 °C) (03/13/20 1128)  Pulse: 84 (03/13/20 1128)  Resp: 16 (03/13/20 1128)  BP: 114/70 (03/13/20 1128)  SpO2: 99 % (03/13/20 1128) Vital Signs (24h Range):  Temp:  [96.9 °F (36.1 °C)-97.9 °F (36.6 °C)] 97.5 °F (36.4 °C)  Pulse:  [75-94] 84  Resp:  [14-17] 16  SpO2:  [95 %-99 %] 99 %  BP: (111-129)/(67-81) 114/70     Weight: 47 kg (103 lb 9.9 oz)  Body mass index is 18.95 kg/m².    Intake/Output - Last 3 Shifts       03/11 0700 - 03/12 0659 03/12 0700 - 03/13 0659 03/13 0700 - 03/14 0659    I.V. (mL/kg) 2459 (52.3)      IV Piggyback 800      Total Intake(mL/kg) 3259 (69.3)      Urine (mL/kg/hr) 1165 (1) 1975 (1.8)     Emesis/NG output  0     Drains 595 350     Other 0 0     Stool 0 0     Blood  0     Total Output 1760 2325     Net +1499 -2325            Urine Occurrence  4 x     Stool Occurrence 0 x 0 x 0 x    Emesis Occurrence  0 x         Physical Exam   Constitutional: She is oriented to person, place, and time.   Cardiovascular: Normal rate and regular rhythm.    Pulmonary/Chest: Effort normal. No respiratory distress.   Abdominal: Soft.   Midline incision c/d/i  R nephrostomy tube   Neurological: She is alert and oriented to person, place, and time.   Psychiatric: She has a normal mood and affect.   Nursing note and vitals reviewed.    Significant Labs:  CBC:   Recent Labs   Lab 03/13/20 0457   WBC 6.86   RBC 2.78*   HGB 8.6*   HCT 27.1*      MCV 98   MCH 30.9   MCHC 31.7*     CMP:   Recent Labs   Lab 03/13/20 0457   GLU 98   CALCIUM 8.6*   ALBUMIN 1.9*   PROT 5.4*   *   K 3.7   CO2 19*      BUN 17   CREATININE 0.7   ALKPHOS 548*   ALT 34   AST 29   BILITOT 2.0*       Significant Diagnostics:  I have reviewed and interpreted all pertinent imaging results/findings within the past 24 hours.

## 2020-03-13 NOTE — PLAN OF CARE
Problem: Physical Therapy Goal  Goal: Physical Therapy Goal  Description  Goals to be met by: 3/25/20    Patient will increase functional independence with mobility by performin. Supine to sit with Stand-by Assistance -not met  2. Sit to stand transfer with Supervision -not met  3. Gait  x 150 feet with Contact Guard Assistance using AD if needed.. -not met  4. Ascend/descend 4 stair with no Handrails Contact Guard Assistance . -not met     Outcome: Ongoing, Progressing   Kuldeep Murrieta, PT,DPT  3/13/2020

## 2020-03-13 NOTE — PROGRESS NOTES
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  Pt has been having abdominal pain since Saturday.  Transferred after CT findings.  Reportedly had been responding to neoadjuvant chemo based on last PET    Post-Op Info:  Procedure(s) (LRB):  LAPAROTOMY, EXPLORATORY (N/A)  CLOSURE, ULCER, PERFORATED, DUODENUM, USING OMENTAL PATCH  BIOPSY, LIVER  INSERTION, PEG TUBE   3 Days Post-Op     Interval History: No acute events overnight.  g-tube output = 300cc yesterday.   Pain moderately controlled.  Denies n/v.    Medications:  Continuous Infusions:   dextrose 5 % and 0.45 % NaCl with KCl 20 mEq 80 mL/hr at 03/13/20 0228    hydromorphone in 0.9 % NaCl 6 mg/30 ml       Scheduled Meds:   enoxaparin  40 mg Subcutaneous Daily    fluconazole (DIFLUCAN) IVPB  200 mg Intravenous Q24H    levalbuterol  0.63 mg Nebulization Q6H WAKE    levothyroxine  25 mcg Intravenous Daily    pantoprazole  40 mg Intravenous BID    piperacillin-tazobactam (ZOSYN) IVPB  4.5 g Intravenous Q8H     PRN Meds:Dextrose 10% Bolus, Dextrose 10% Bolus, glucagon (human recombinant), influenza, insulin aspart U-100, naloxone, ondansetron, sodium chloride 0.9%     Review of patient's allergies indicates:   Allergen Reactions    Azithromycin Other (See Comments)     Stomach pain     Codeine Hallucinations     Objective:     Vital Signs (Most Recent):  Temp: 97.5 °F (36.4 °C) (03/13/20 1128)  Pulse: 84 (03/13/20 1128)  Resp: 16 (03/13/20 1128)  BP: 114/70 (03/13/20 1128)  SpO2: 99 % (03/13/20 1128) Vital Signs (24h Range):  Temp:  [96.9 °F (36.1 °C)-97.9 °F (36.6 °C)] 97.5 °F (36.4 °C)  Pulse:  [75-94] 84  Resp:  [14-17] 16  SpO2:  [95 %-99 %] 99 %  BP: (111-129)/(67-81) 114/70     Weight: 47  kg (103 lb 9.9 oz)  Body mass index is 18.95 kg/m².    Intake/Output - Last 3 Shifts       03/11 0700 - 03/12 0659 03/12 0700 - 03/13 0659 03/13 0700 - 03/14 0659    I.V. (mL/kg) 2459 (52.3)      IV Piggyback 800      Total Intake(mL/kg) 3259 (69.3)      Urine (mL/kg/hr) 1165 (1) 1975 (1.8)     Emesis/NG output  0     Drains 595 350     Other 0 0     Stool 0 0     Blood  0     Total Output 1760 2325     Net +1499 -2325            Urine Occurrence  4 x     Stool Occurrence 0 x 0 x 0 x    Emesis Occurrence  0 x         Physical Exam   Constitutional: She is oriented to person, place, and time.   Cardiovascular: Normal rate and regular rhythm.   Pulmonary/Chest: Effort normal. No respiratory distress.   Abdominal: Soft.   Midline incision c/d/i  R nephrostomy tube   Neurological: She is alert and oriented to person, place, and time.   Psychiatric: She has a normal mood and affect.   Nursing note and vitals reviewed.    Significant Labs:  CBC:   Recent Labs   Lab 03/13/20  0457   WBC 6.86   RBC 2.78*   HGB 8.6*   HCT 27.1*      MCV 98   MCH 30.9   MCHC 31.7*     CMP:   Recent Labs   Lab 03/13/20  0457   GLU 98   CALCIUM 8.6*   ALBUMIN 1.9*   PROT 5.4*   *   K 3.7   CO2 19*      BUN 17   CREATININE 0.7   ALKPHOS 548*   ALT 34   AST 29   BILITOT 2.0*       Significant Diagnostics:  I have reviewed and interpreted all pertinent imaging results/findings within the past 24 hours.    Assessment/Plan:     * Pneumoperitoneum  75 y/o F with pancreatic cancer with plans to undergo Whipple next week with right nephrectomy and caval reconstruction with Dr. Wall. Transferred in with pneumoperitoneum. S/p exlap with repair of duodenal perforation and peritoneal biopsies on 3/10.    NPO  G-tube to gravity  PT/OT; OOB to chair  Continue broad spectrum abx for duodenal perforation  mIVF  SCDs/lovenox/protonix  UGI study today  Patient has hypokalemia which is currently controlled. Last electrolytes reviewed-   Recent  Labs   Lab 03/12/20  0449 03/13/20  0457   K 3.5 3.7   . Will replace potassium and monitor electrolytes closely.   - Patient's anemia is currently controlled   Current CBC reviewed-   Lab Results   Component Value Date    HGB 8.6 (L) 03/13/2020    HCT 27.1 (L) 03/13/2020   Monitor serial CBC and transfuse if patient becomes hemodynamically unstable, symptomatic or H/H drops below 7/21.   -Magnesium reviewed-   Recent Labs   Lab 03/13/20 0457   MG 1.4*    Will replace electrolytes and continue to monitor closely.  -hypophosphatemia. Will replace phosphorus and continue to monitor closely.        Dispo: SNF planning         Kate Gabriel NP  General Surgery  [facility]

## 2020-03-13 NOTE — PT/OT/SLP PROGRESS
Occupational Therapy   Treatment    Name: Julee Hawkins  MRN: [id number]  Admitting Diagnosis:  Pneumoperitoneum  3 Days Post-Op    Recommendations:     Discharge Recommendations: nursing facility, skilled  Discharge Equipment Recommendations:  (TBD)  Barriers to discharge:  (increased assistance needed)    Assessment:     Julee Hawkins is a 74 y.o. female with a medical diagnosis of Pneumoperitoneum.  She presents with good motivation and participation. Pt required CGA for all ADLs and mobility this date. Pt demo'd fatigue following ADLs with prolonged standing at the sink. Performance deficits affecting function are weakness, impaired endurance, impaired self care skills, impaired functional mobilty, gait instability, impaired balance. Pt would benefit from skilled OT services in order to maximize independence with ADLs and facilitate safe discharge. Discussion with PT, pt, and pt's  regarding discharge planning. OT recommending SNF upon discharge to improve pt's strength and endurance. Pt and  would like SNF upon discharge to return to [facility].    Rehab Prognosis:  Good; patient would benefit from acute skilled OT services to address these deficits and reach maximum level of function.       Plan:     Patient to be seen 3 x/week to address the above listed problems via self-care/home management, therapeutic activities, therapeutic exercises  · Plan of Care Expires: 04/11/20  · Plan of Care Reviewed with: patient    Subjective     Pain/Comfort:  · Pain Rating 1: 7/10  · Location - Orientation 1: generalized  · Location 1: abdomen  · Pain Addressed 1: Pre-medicate for activity(PCA pump)  · Pain Rating Post-Intervention 1: 7/10    Objective:     Communicated with: RN and PT prior to session.  Patient found HOB elevated with wound vac, PCA, PICC line, SCD(rebollar to abdomen,nephrostomy tube) upon OT entry to room.    General Precautions: Standard, fall   Orthopedic Precautions:N/A   Braces: N/A      Occupational Performance:     Bed Mobility:    · Patient completed Rolling/Turning to Left with  contact guard assistance  · Patient completed Supine to Sit with contact guard assistance   · HOB elevated, VCs for technique    Functional Mobility/Transfers:  · Patient completed Sit <> Stand Transfer with contact guard assistance  with  hand-held assist from EOB  · Functional Mobility: Pt ambulated bed to bathroom with CGA and RW to perform standing ADLs at the sink. Pt then ambulated bathroom to bedside chair with CGA and RW. On return to chair pt displayed decreased endurance and fatigue. No LOB, SOB, or c/o of dizziness during mobility.    Activities of Daily Living:  · Grooming: contact guard assistance for ~12 minutes standing balance at the sink to perform oral hygiene and wash face      AMPAC 6 Click ADL: 15    Treatment & Education:  -Therapist provided facilitation and instruction of proper body mechanics, energy conservation, and fall prevention strategies during tasks listed above.  -Pt educated on role of OT, POC and goals for therapy  -Discussed therapy d/c recommendation of SNF, pt and  wanting pt to receive more therapy before returning home  -Pt educated on importance of OOB activities with staff member assistance and sitting OOB majority of the day.   -Pt verbalized understanding. Pt expressed no further concerns/questions  -Whiteboard updated      Patient left up in chair with all lines intact, call button in reach, RN notified and  presentEducation:      GOALS:   Multidisciplinary Problems     Occupational Therapy Goals        Problem: Occupational Therapy Goal    Goal Priority Disciplines Outcome Interventions   Occupational Therapy Goal     OT, PT/OT Ongoing, Progressing    Description:  Goals to be met by: 3/18/2020     Patient will increase functional independence with ADLs by performing:    UE Dressing with Stand-by Assistance.  LE Dressing with Stand-by Assistance.  Grooming  while standing at sink with Stand-by Assistance.  Toileting from toilet with Stand-by Assistance for hygiene and clothing management.   Upper extremity exercise program x10 reps per handout, with supervision.  Pt will perform functional mobility task of household and community distance with SBA using AD as needed.                    Time Tracking:     OT Date of Treatment: 03/13/20  OT Start Time: 0926  OT Stop Time: 1004  OT Total Time (min): 38 min    Billable Minutes:Self Care/Home Management 38    Bess Frost OT  3/13/2020

## 2020-03-13 NOTE — PLAN OF CARE
Problem: Occupational Therapy Goal  Goal: Occupational Therapy Goal  Description  Goals to be met by: 3/18/2020     Patient will increase functional independence with ADLs by performing:    UE Dressing with Stand-by Assistance.  LE Dressing with Stand-by Assistance.  Grooming while standing at sink with Stand-by Assistance.  Toileting from toilet with Stand-by Assistance for hygiene and clothing management.   Upper extremity exercise program x10 reps per handout, with supervision.  Pt will perform functional mobility task of household and community distance with SBA using AD as needed.   Outcome: Ongoing, Progressing     Goals reviewed and remain appropriate, continue POC    Bess Frost OTR/L  3/13/2020   Pager #: [phone]

## 2020-03-13 NOTE — ASSESSMENT & PLAN NOTE
75 y/o F with pancreatic cancer with plans to undergo Whipple next week with right nephrectomy and caval reconstruction with Dr. Wall. Transferred in with pneumoperitoneum. S/p exlap with repair of duodenal perforation and peritoneal biopsies on 3/10.    NPO  G-tube to gravity  PT/OT; OOB to chair  Continue broad spectrum abx for duodenal perforation  mIVF  SCDs/lovenox/protonix  UGI study today    Dispo: Continue [facility] care.

## 2020-03-13 NOTE — CARE UPDATE
Rapid Response Respiratory Therapy ETCO2 Check     Time of visit: 1205     Code Status: Full Code   : 1945  Bed: /2 A:   MRN: [id number]    SITUATION     Evaluated patient for: ETCO2 Compliance     BACKGROUND     Patient has a past medical history of Arthritis, Biliary stricture, Cancer, Cholangitis, Dilated bile duct, Duodenal stenosis, Jaundice, Pancreas cyst, Pancreatic mass, Protein calorie malnutrition, Thyroid disease, Ureteral stricture, right, Vitamin D deficiency, and Weight loss, unintentional.    ASSESSMENT     Is the ETCO2 monitor on? (Yes/No)  yes  Is the patient wearing a cannula? (Yes/No) yes  Are ETCO2 orders placed? (Yes/No) yes  Is the patient on a PCA pump? (Yes/No) yes  ETCO2 monitored: ETCO2 (mmHg): 36 mmHg  O2 Device/Concentration: room air  Pulse: 84 Respiratory rate: 17 Temperature: Temp: 97.5 °F (36.4 °C) BP: BP: 114/70 SpO2: 98  Level of Consciousness: Level of Consciousness (AVPU): alert  All Lung Field Breath Sounds: All Lung Fields Breath Sounds: clear, equal bilaterally  Ambu at bedside: Ambu bag with the patient?: Yes, Adult Ambu    INTERVENTIONS/RECOMMENDATIONS     Continue POC    PHYSICIAN ESCALATION     Physician Escalation (Yes/No): no     Discussed plan of care primary RTLauren CRT     FOLLOW-UP     Please call back the Rapid Response RT, Devika Caballero, RRT at x [phone] for any questions or concerns.

## 2020-03-13 NOTE — PLAN OF CARE
PT/OT recommending SNF placement upon discharge.    Referrals placed in [facility] for patient's first choice ([facility]) and with [facility].

## 2020-03-13 NOTE — PLAN OF CARE
Plan of care reviewed with patient who demonstrated understanding. Npo Nephrostomy tube draining. Wound vac in place. Patient up to the commode x3 with adequate urine output. Bed in low position, bed rails x2, call light within reach frequent monitoring continued.

## 2020-03-14 LAB
ALBUMIN SERPL BCP-MCNC: 1.8 G/DL (ref 3.5–5.2)
ALP SERPL-CCNC: 511 U/L (ref 55–135)
ALT SERPL W/O P-5'-P-CCNC: 30 U/L (ref 10–44)
ANION GAP SERPL CALC-SCNC: 5 MMOL/L (ref 8–16)
ANION GAP SERPL CALC-SCNC: 7 MMOL/L (ref 8–16)
AST SERPL-CCNC: 25 U/L (ref 10–40)
BASOPHILS # BLD AUTO: 0.01 K/UL (ref 0–0.2)
BASOPHILS NFR BLD: 0.2 % (ref 0–1.9)
BILIRUB SERPL-MCNC: 1.8 MG/DL (ref 0.1–1)
BLD PROD TYP BPU: NORMAL
BLD PROD TYP BPU: NORMAL
BLOOD UNIT EXPIRATION DATE: NORMAL
BLOOD UNIT EXPIRATION DATE: NORMAL
BLOOD UNIT TYPE CODE: 5100
BLOOD UNIT TYPE CODE: 5100
BLOOD UNIT TYPE: NORMAL
BLOOD UNIT TYPE: NORMAL
BUN SERPL-MCNC: 7 MG/DL (ref 8–23)
BUN SERPL-MCNC: 8 MG/DL (ref 8–23)
CALCIUM SERPL-MCNC: 7.9 MG/DL (ref 8.7–10.5)
CALCIUM SERPL-MCNC: 8.1 MG/DL (ref 8.7–10.5)
CHLORIDE SERPL-SCNC: 100 MMOL/L (ref 95–110)
CHLORIDE SERPL-SCNC: 99 MMOL/L (ref 95–110)
CO2 SERPL-SCNC: 23 MMOL/L (ref 23–29)
CO2 SERPL-SCNC: 24 MMOL/L (ref 23–29)
CODING SYSTEM: NORMAL
CODING SYSTEM: NORMAL
CREAT SERPL-MCNC: 0.7 MG/DL (ref 0.5–1.4)
CREAT SERPL-MCNC: 0.7 MG/DL (ref 0.5–1.4)
DIFFERENTIAL METHOD: ABNORMAL
DISPENSE STATUS: NORMAL
DISPENSE STATUS: NORMAL
EOSINOPHIL # BLD AUTO: 0.2 K/UL (ref 0–0.5)
EOSINOPHIL NFR BLD: 3.2 % (ref 0–8)
ERYTHROCYTE [DISTWIDTH] IN BLOOD BY AUTOMATED COUNT: 16 % (ref 11.5–14.5)
EST. GFR  (AFRICAN AMERICAN): >60 ML/MIN/1.73 M^2
EST. GFR  (AFRICAN AMERICAN): >60 ML/MIN/1.73 M^2
EST. GFR  (NON AFRICAN AMERICAN): >60 ML/MIN/1.73 M^2
EST. GFR  (NON AFRICAN AMERICAN): >60 ML/MIN/1.73 M^2
GLUCOSE SERPL-MCNC: 118 MG/DL (ref 70–110)
GLUCOSE SERPL-MCNC: 290 MG/DL (ref 70–110)
HCT VFR BLD AUTO: 24.3 % (ref 37–48.5)
HGB BLD-MCNC: 7.8 G/DL (ref 12–16)
IMM GRANULOCYTES # BLD AUTO: 0.07 K/UL (ref 0–0.04)
IMM GRANULOCYTES NFR BLD AUTO: 1.2 % (ref 0–0.5)
LYMPHOCYTES # BLD AUTO: 0.7 K/UL (ref 1–4.8)
LYMPHOCYTES NFR BLD: 11 % (ref 18–48)
MAGNESIUM SERPL-MCNC: 1.4 MG/DL (ref 1.6–2.6)
MAGNESIUM SERPL-MCNC: 1.5 MG/DL (ref 1.6–2.6)
MCH RBC QN AUTO: 31.7 PG (ref 27–31)
MCHC RBC AUTO-ENTMCNC: 32.1 G/DL (ref 32–36)
MCV RBC AUTO: 99 FL (ref 82–98)
MONOCYTES # BLD AUTO: 0.4 K/UL (ref 0.3–1)
MONOCYTES NFR BLD: 7.3 % (ref 4–15)
NEUTROPHILS # BLD AUTO: 4.5 K/UL (ref 1.8–7.7)
NEUTROPHILS NFR BLD: 77.1 % (ref 38–73)
NRBC BLD-RTO: 0 /100 WBC
PHOSPHATE SERPL-MCNC: 3.7 MG/DL (ref 2.7–4.5)
PLATELET # BLD AUTO: 251 K/UL (ref 150–350)
PMV BLD AUTO: 10 FL (ref 9.2–12.9)
POCT GLUCOSE: 118 MG/DL (ref 70–110)
POCT GLUCOSE: 123 MG/DL (ref 70–110)
POCT GLUCOSE: 124 MG/DL (ref 70–110)
POTASSIUM SERPL-SCNC: 3.6 MMOL/L (ref 3.5–5.1)
POTASSIUM SERPL-SCNC: 4.8 MMOL/L (ref 3.5–5.1)
PROT SERPL-MCNC: 5.2 G/DL (ref 6–8.4)
RBC # BLD AUTO: 2.46 M/UL (ref 4–5.4)
SODIUM SERPL-SCNC: 128 MMOL/L (ref 136–145)
SODIUM SERPL-SCNC: 130 MMOL/L (ref 136–145)
TB INDURATION 48 - 72 HR READ: 0 MM
TRANS ERYTHROCYTES VOL PATIENT: NORMAL ML
TRANS ERYTHROCYTES VOL PATIENT: NORMAL ML
WBC # BLD AUTO: 5.89 K/UL (ref 3.9–12.7)

## 2020-03-14 PROCEDURE — 25000242 PHARM REV CODE 250 ALT 637 W/ HCPCS: Performed by: NURSE PRACTITIONER

## 2020-03-14 PROCEDURE — 83735 ASSAY OF MAGNESIUM: CPT

## 2020-03-14 PROCEDURE — 80048 BASIC METABOLIC PNL TOTAL CA: CPT

## 2020-03-14 PROCEDURE — 85025 COMPLETE CBC W/AUTO DIFF WBC: CPT

## 2020-03-14 PROCEDURE — 99900035 HC TECH TIME PER 15 MIN (STAT)

## 2020-03-14 PROCEDURE — 94640 AIRWAY INHALATION TREATMENT: CPT

## 2020-03-14 PROCEDURE — 63600175 PHARM REV CODE 636 W HCPCS: Performed by: STUDENT IN AN ORGANIZED HEALTH CARE EDUCATION/TRAINING PROGRAM

## 2020-03-14 PROCEDURE — 84100 ASSAY OF PHOSPHORUS: CPT

## 2020-03-14 PROCEDURE — 94664 DEMO&/EVAL PT USE INHALER: CPT

## 2020-03-14 PROCEDURE — 80053 COMPREHEN METABOLIC PANEL: CPT

## 2020-03-14 PROCEDURE — 36415 COLL VENOUS BLD VENIPUNCTURE: CPT

## 2020-03-14 PROCEDURE — 94761 N-INVAS EAR/PLS OXIMETRY MLT: CPT

## 2020-03-14 PROCEDURE — 94770 HC EXHALED C02 TEST: CPT

## 2020-03-14 PROCEDURE — 25000003 PHARM REV CODE 250: Performed by: STUDENT IN AN ORGANIZED HEALTH CARE EDUCATION/TRAINING PROGRAM

## 2020-03-14 PROCEDURE — C9113 INJ PANTOPRAZOLE SODIUM, VIA: HCPCS | Performed by: STUDENT IN AN ORGANIZED HEALTH CARE EDUCATION/TRAINING PROGRAM

## 2020-03-14 PROCEDURE — 20600001 HC STEP DOWN PRIVATE ROOM

## 2020-03-14 PROCEDURE — 83735 ASSAY OF MAGNESIUM: CPT | Mod: 91

## 2020-03-14 PROCEDURE — 25000003 PHARM REV CODE 250: Performed by: NURSE PRACTITIONER

## 2020-03-14 RX ORDER — MAGNESIUM SULFATE HEPTAHYDRATE 40 MG/ML
2 INJECTION, SOLUTION INTRAVENOUS
Status: COMPLETED | OUTPATIENT
Start: 2020-03-14 | End: 2020-03-14

## 2020-03-14 RX ADMIN — LEVALBUTEROL HYDROCHLORIDE 0.63 MG: 0.63 SOLUTION RESPIRATORY (INHALATION) at 08:03

## 2020-03-14 RX ADMIN — MAGNESIUM SULFATE HEPTAHYDRATE 2 G: 40 INJECTION, SOLUTION INTRAVENOUS at 04:03

## 2020-03-14 RX ADMIN — DEXTROSE MONOHYDRATE, SODIUM CHLORIDE, AND POTASSIUM CHLORIDE: 50; 4.5; 1.49 INJECTION, SOLUTION INTRAVENOUS at 04:03

## 2020-03-14 RX ADMIN — DEXTROSE MONOHYDRATE, SODIUM CHLORIDE, AND POTASSIUM CHLORIDE: 50; 4.5; 1.49 INJECTION, SOLUTION INTRAVENOUS at 03:03

## 2020-03-14 RX ADMIN — MAGNESIUM SULFATE HEPTAHYDRATE 2 G: 40 INJECTION, SOLUTION INTRAVENOUS at 10:03

## 2020-03-14 RX ADMIN — LEVALBUTEROL HYDROCHLORIDE 0.63 MG: 0.63 SOLUTION RESPIRATORY (INHALATION) at 02:03

## 2020-03-14 RX ADMIN — PANTOPRAZOLE SODIUM 40 MG: 40 INJECTION, POWDER, FOR SOLUTION INTRAVENOUS at 08:03

## 2020-03-14 RX ADMIN — LEVOTHYROXINE SODIUM ANHYDROUS 25 MCG: 100 INJECTION, POWDER, LYOPHILIZED, FOR SOLUTION INTRAVENOUS at 10:03

## 2020-03-14 RX ADMIN — ENOXAPARIN SODIUM 40 MG: 100 INJECTION SUBCUTANEOUS at 04:03

## 2020-03-14 RX ADMIN — LEVALBUTEROL HYDROCHLORIDE 0.63 MG: 0.63 SOLUTION RESPIRATORY (INHALATION) at 09:03

## 2020-03-14 RX ADMIN — MAGNESIUM SULFATE HEPTAHYDRATE 2 G: 40 INJECTION, SOLUTION INTRAVENOUS at 06:03

## 2020-03-14 RX ADMIN — PANTOPRAZOLE SODIUM 40 MG: 40 INJECTION, POWDER, FOR SOLUTION INTRAVENOUS at 10:03

## 2020-03-14 RX ADMIN — Medication: at 04:03

## 2020-03-14 RX ADMIN — MAGNESIUM SULFATE HEPTAHYDRATE 2 G: 40 INJECTION, SOLUTION INTRAVENOUS at 12:03

## 2020-03-14 NOTE — RESPIRATORY THERAPY
Rapid Response Respiratory Therapy ETCO2 Check     Time of visit: 1342     Code Status: Full Code   : 1945  Bed:  A:   MRN: [id number]    SITUATION     Evaluated patient for: ETCO2 Compliance     BACKGROUND     Patient has a past medical history of Arthritis, Biliary stricture, Cancer, Cholangitis, Dilated bile duct, Duodenal stenosis, Jaundice, Pancreas cyst, Pancreatic mass, Protein calorie malnutrition, Thyroid disease, Ureteral stricture, right, Vitamin D deficiency, and Weight loss, unintentional.    ASSESSMENT     Is the ETCO2 monitor on? (Yes/No)  Yes   Is the patient wearing a cannula? (Yes/No) Yes  Are ETCO2 orders placed? (Yes/No) Yes  Is the patient on a PCA pump? (Yes/No) Yes  ETCO2 monitored: ETCO2 (mmHg): 37 mmHg  O2 Device/Concentration: RA  Pulse: 80 Respiratory rate: 16 Temperature: Temp: 98.3 °F (36.8 °C) BP: BP: 102/66 SpO2: 94  Level of Consciousness: Level of Consciousness (AVPU): alert  All Lung Field Breath Sounds: All Lung Fields Breath Sounds: Anterior:, Lateral:, clear, equal bilaterally  Ambu at bedside: Ambu bag with the patient?: Yes, Adult Ambu    INTERVENTIONS/RECOMMENDATIONS     Continue current POC and call with any acute issues.    PHYSICIAN ESCALATION     Physician Escalation (Yes/No): No     Care discussed with: N/A  Discussed plan of care primary RT, Rosa     FOLLOW-UP     Please call back the Rapid Response RT, Solange Vera, CRT at x [phone] for any questions or concerns.

## 2020-03-14 NOTE — OP NOTE
[facility]  Surgery Department  Operative Note       Date of Procedure: 3/10/2020       Surgeon(s):  Surgeon(s) and Role:     * Quang Wall MD - Primary     * Quincy Barry MD - Resident - Assisting     * Dejuan Foy MD - Resident - Assisting    Pre-Operative Diagnosis:   1. Pneumoperitoneum [K66.8]  2. Pancreatic adenocarcinoma    Post-Operative Diagnosis:   1. Same with carcinomatosis     Anesthesia: General    Operative Findings:   1. Perforation of duodenum   2. Carcinomatosis    Procedures:  1. Exploratory laparotomy with repair of duodenal perforation  2. Omental pedicle flap  3. Biopsy of peritoneum and liver  4. Placement of wound vac    Estimated Blood Loss (EBL):  35 mL    Specimen(s):    Specimen (12h ago, onward)    None                 Indications:  Julee Hawkins presents for the above procedures.  Risks and benefits were reviewed including bleeding, infection, damage to local structures,, need for additional procedures, death, and imponderables.  She understands and gave informed consent to proceed.    Details:  The patient was transported to the operating room and satisfactory anesthesia established.  Preoperative antibiotics were administered.  The patient was placed in the supine position and extremities were padded and protected throughout.  A rebollar catheter was placed.  Appropriate lines were placed by anesthesia.    The operative field was prepped and draped in sterile fashion.  A timeout was performed.  The abdomen was entered sharply via a  midline incision through the linea alba.  The peritoneum was breached sharply under direct vision.  An Mumtaz wound retractor was placed and an Omni retractor was used for exposure.    The abdomen was explored.  There was a 2 mm perforation in the duodenum.  There was moderate volume upper gi soilage which was irrigated copiously.  There was a bulky immobile mass in the head of the pancreas.  There were miliary  nodules in the pelvis and peritoneum and a firm subcapsular mass in segment 3 of the liver.  This was consistent with carcinomatosis.  Biopsies were obtained of the peritoneum and the liver.  Hemostasis was obtained with cautery.  This patient was sick and we did not obtain frozen sections.    The duodenal perforation was closed using interrupted 2-0 vicryl.  A gastroscope was introduced by Dr. Barry to insufflate and inspect the stomach.  No additional holes were noted.  An omental pedicled flap was fashioned based on the right gastroepiploics.  This was secured over the repair in [place] patch fashion using interrupted 2-0 Vicryl sutures.     The abdomen was irrigated copiously until clear.  A separate sterile closure setup was used.  The abdomen was closed over a fish retractor using looped #1 PDS suture in continuous shallow bite/short advance fashion.  Wound was irrigated copiously and the skin was closed with staples.  An incisional wound vac was fashioned out of a xeroform and black sponge and placed to seal.      All needle, lap, and sponge counts were reported as correct.  I communicated the intraoperative findings with the family following the procedure.     Condition: Critical    Disposition: ICU - extubated and stable.    Attestation: I was present and scrubbed for the key portions of the procedure.

## 2020-03-14 NOTE — PLAN OF CARE
Plan of care reviewed with pt: AAOx4, on RA, VS stable. Midline incision with Woundvac removed by MD, now with staples, DELVIS, CDI. G tube intact, tube clamped for 6 hours between 0730 to 1330, tube unclamped for an hour with only 200cc of thick green bile drainage. Pt did not complain of nausea when tube clamped. Pain under control with PCA Dilaudid, which pt has been using very often. Got up to bedside commode with 1 person assist. Pt is very weak and unstable on her feet. Nephrostomy intact with clear yellow urine. No bowel movement. Spouse at bedside. TB skin test read this afternoon with negative result. Call light in reach. WCTM.

## 2020-03-14 NOTE — SUBJECTIVE & OBJECTIVE
Interval History: No acute events overnight. AF and VSS. She slept okay overnight. Continues to have abd pain. G tube put out 400 cc over night shift.     Medications:  Continuous Infusions:   dextrose 5 % and 0.45 % NaCl with KCl 20 mEq 80 mL/hr at 03/14/20 0352    hydromorphone in 0.9 % NaCl 6 mg/30 ml       Scheduled Meds:   enoxaparin  40 mg Subcutaneous Daily    fluconazole (DIFLUCAN) IVPB  200 mg Intravenous Q24H    levalbuterol  0.63 mg Nebulization Q6H WAKE    levothyroxine  25 mcg Intravenous Daily    pantoprazole  40 mg Intravenous BID    piperacillin-tazobactam (ZOSYN) IVPB  4.5 g Intravenous Q8H     PRN Meds:Dextrose 10% Bolus, Dextrose 10% Bolus, glucagon (human recombinant), influenza, insulin aspart U-100, naloxone, ondansetron, sodium chloride 0.9%     Review of patient's allergies indicates:   Allergen Reactions    Azithromycin Other (See Comments)     Stomach pain     Codeine Hallucinations     Objective:     Vital Signs (Most Recent):  Temp: 97.8 °F (36.6 °C) (03/14/20 0355)  Pulse: 77 (03/14/20 0355)  Resp: 14 (03/14/20 0355)  BP: 113/66 (03/14/20 0355)  SpO2: 99 % (03/14/20 0355) Vital Signs (24h Range):  Temp:  [97.4 °F (36.3 °C)-97.9 °F (36.6 °C)] 97.8 °F (36.6 °C)  Pulse:  [77-91] 77  Resp:  [14-20] 14  SpO2:  [96 %-100 %] 99 %  BP: (110-119)/(66-71) 113/66     Weight: 47 kg (103 lb 9.9 oz)  Body mass index is 18.95 kg/m².    Intake/Output - Last 3 Shifts       03/12 0700 - 03/13 0659 03/13 0700 - 03/14 0659 03/14 0700 - 03/15 0659    I.V. (mL/kg)  1918.7 (40.8)     IV Piggyback  500     Total Intake(mL/kg)  2418.7 (51.5)     Urine (mL/kg/hr) 1975 (1.8) 1300 (1.2)     Emesis/NG output 0 0     Drains 350 1300     Other 0 0     Stool 0 0     Blood 0      Total Output 2325 2600     Net -2325 -181.3            Urine Occurrence 4 x 3 x     Stool Occurrence 0 x 0 x     Emesis Occurrence 0 x 0 x         Physical Exam   Constitutional: She is oriented to person, place, and time.    Cardiovascular: Normal rate and regular rhythm.   Pulmonary/Chest: Effort normal. No respiratory distress.   Abdominal: Soft.   Midline incision c/d/i  G tube in LUQ  R nephrostomy tube   Neurological: She is alert and oriented to person, place, and time.   Psychiatric: She has a normal mood and affect.   Nursing note and vitals reviewed.    Significant Labs:  CBC:   Recent Labs   Lab 03/14/20  0410   WBC 5.89   RBC 2.46*   HGB 7.8*   HCT 24.3*      MCV 99*   MCH 31.7*   MCHC 32.1     CMP:   Recent Labs   Lab 03/14/20  0410   *   CALCIUM 7.9*   ALBUMIN 1.8*   PROT 5.2*   *   K 4.8   CO2 24   CL 99   BUN 8   CREATININE 0.7   ALKPHOS 511*   ALT 30   AST 25   BILITOT 1.8*       Significant Diagnostics:  I have reviewed and interpreted all pertinent imaging results/findings within the past 24 hours.

## 2020-03-14 NOTE — PROGRESS NOTES
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  Pt has been having abdominal pain since Saturday.  Transferred after CT findings.  Reportedly had been responding to neoadjuvant chemo based on last PET    Post-Op Info:  Procedure(s) (LRB):  LAPAROTOMY, EXPLORATORY (N/A)  CLOSURE, ULCER, PERFORATED, DUODENUM, USING OMENTAL PATCH  BIOPSY, LIVER  INSERTION, PEG TUBE   4 Days Post-Op     Interval History: No acute events overnight. AF and VSS. She slept okay overnight. Continues to have abd pain. G tube put out 400 cc over night shift.     Medications:  Continuous Infusions:   dextrose 5 % and 0.45 % NaCl with KCl 20 mEq 80 mL/hr at 03/14/20 0352    hydromorphone in 0.9 % NaCl 6 mg/30 ml       Scheduled Meds:   enoxaparin  40 mg Subcutaneous Daily    fluconazole (DIFLUCAN) IVPB  200 mg Intravenous Q24H    levalbuterol  0.63 mg Nebulization Q6H WAKE    levothyroxine  25 mcg Intravenous Daily    pantoprazole  40 mg Intravenous BID    piperacillin-tazobactam (ZOSYN) IVPB  4.5 g Intravenous Q8H     PRN Meds:Dextrose 10% Bolus, Dextrose 10% Bolus, glucagon (human recombinant), influenza, insulin aspart U-100, naloxone, ondansetron, sodium chloride 0.9%     Review of patient's allergies indicates:   Allergen Reactions    Azithromycin Other (See Comments)     Stomach pain     Codeine Hallucinations     Objective:     Vital Signs (Most Recent):  Temp: 97.8 °F (36.6 °C) (03/14/20 0355)  Pulse: 77 (03/14/20 0355)  Resp: 14 (03/14/20 0355)  BP: 113/66 (03/14/20 0355)  SpO2: 99 % (03/14/20 0355) Vital Signs (24h Range):  Temp:  [97.4 °F (36.3 °C)-97.9 °F (36.6 °C)] 97.8 °F (36.6 °C)  Pulse:  [77-91] 77  Resp:  [14-20] 14  SpO2:  [96 %-100 %] 99 %  BP:  (110-119)/(66-71) 113/66     Weight: 47 kg (103 lb 9.9 oz)  Body mass index is 18.95 kg/m².    Intake/Output - Last 3 Shifts       03/12 0700 - 03/13 0659 03/13 0700 - 03/14 0659 03/14 0700 - 03/15 0659    I.V. (mL/kg)  1918.7 (40.8)     IV Piggyback  500     Total Intake(mL/kg)  2418.7 (51.5)     Urine (mL/kg/hr) 1975 (1.8) 1300 (1.2)     Emesis/NG output 0 0     Drains 350 1300     Other 0 0     Stool 0 0     Blood 0      Total Output 2325 2600     Net -2325 -181.3            Urine Occurrence 4 x 3 x     Stool Occurrence 0 x 0 x     Emesis Occurrence 0 x 0 x         Physical Exam   Constitutional: She is oriented to person, place, and time.   Cardiovascular: Normal rate and regular rhythm.   Pulmonary/Chest: Effort normal. No respiratory distress.   Abdominal: Soft.   Midline incision c/d/i  G tube in LUQ  R nephrostomy tube   Neurological: She is alert and oriented to person, place, and time.   Psychiatric: She has a normal mood and affect.   Nursing note and vitals reviewed.    Significant Labs:  CBC:   Recent Labs   Lab 03/14/20  0410   WBC 5.89   RBC 2.46*   HGB 7.8*   HCT 24.3*      MCV 99*   MCH 31.7*   MCHC 32.1     CMP:   Recent Labs   Lab 03/14/20  0410   *   CALCIUM 7.9*   ALBUMIN 1.8*   PROT 5.2*   *   K 4.8   CO2 24   CL 99   BUN 8   CREATININE 0.7   ALKPHOS 511*   ALT 30   AST 25   BILITOT 1.8*       Significant Diagnostics:  I have reviewed and interpreted all pertinent imaging results/findings within the past 24 hours.    Assessment/Plan:     * Pneumoperitoneum  73 y/o F with pancreatic cancer with plans to undergo Whipple next week with right nephrectomy and caval reconstruction with Dr. Wall. Transferred in with pneumoperitoneum. S/p exlap with repair of duodenal perforation and peritoneal biopsies on 3/10. UGI on 3/14 demonstrating no evidence of leak.     -NPO  -Clamp G-tube, unclamp q6 hr or for nausea  -PT/OT; OOB to chair  -DC broad spectrum abx    -mIVF  -SCDs/lovenox/protonix    Dispo: Continue [facility] care.        Stefano Robledo MD  General Surgery  [facility]

## 2020-03-14 NOTE — ASSESSMENT & PLAN NOTE
73 y/o F with pancreatic cancer with plans to undergo Whipple next week with right nephrectomy and caval reconstruction with Dr. Wall. Transferred in with pneumoperitoneum. S/p exlap with repair of duodenal perforation and peritoneal biopsies on 3/10. UGI on 3/14 demonstrating no evidence of leak.     -NPO  -Clamp G-tube, unclamp q6 hr or for nausea  -PT/OT; OOB to chair  -DC broad spectrum abx   -mIVF  -SCDs/lovenox/protonix    Dispo: Continue [facility] care.

## 2020-03-14 NOTE — PLAN OF CARE
Plan of care reviewed with patient who demonstrated understanding. NPO maintained no complaints of nausea or vomiting. Nephrostomy tube intact, dressing changed. Scant amounts of serous drainage. Wound vac in place. Abdominal incision, CDI with no visible drainage. Patient up to the commode x3 with adequate urine output. Dilaudid PCA adequately controlling patients pain levels. Bed in low position, bed rails x2, call light within reach frequent monitoring continued.

## 2020-03-15 LAB
ALBUMIN SERPL BCP-MCNC: 1.9 G/DL (ref 3.5–5.2)
ALP SERPL-CCNC: 541 U/L (ref 55–135)
ALT SERPL W/O P-5'-P-CCNC: 27 U/L (ref 10–44)
ANION GAP SERPL CALC-SCNC: 7 MMOL/L (ref 8–16)
AST SERPL-CCNC: 26 U/L (ref 10–40)
BACTERIA BLD CULT: NORMAL
BACTERIA BLD CULT: NORMAL
BASOPHILS # BLD AUTO: 0.01 K/UL (ref 0–0.2)
BASOPHILS NFR BLD: 0.2 % (ref 0–1.9)
BILIRUB SERPL-MCNC: 1.4 MG/DL (ref 0.1–1)
BUN SERPL-MCNC: 4 MG/DL (ref 8–23)
CALCIUM SERPL-MCNC: 8.1 MG/DL (ref 8.7–10.5)
CHLORIDE SERPL-SCNC: 101 MMOL/L (ref 95–110)
CO2 SERPL-SCNC: 24 MMOL/L (ref 23–29)
CREAT SERPL-MCNC: 0.6 MG/DL (ref 0.5–1.4)
DIFFERENTIAL METHOD: ABNORMAL
EOSINOPHIL # BLD AUTO: 0.3 K/UL (ref 0–0.5)
EOSINOPHIL NFR BLD: 4.2 % (ref 0–8)
ERYTHROCYTE [DISTWIDTH] IN BLOOD BY AUTOMATED COUNT: 15.8 % (ref 11.5–14.5)
EST. GFR  (AFRICAN AMERICAN): >60 ML/MIN/1.73 M^2
EST. GFR  (NON AFRICAN AMERICAN): >60 ML/MIN/1.73 M^2
GLUCOSE SERPL-MCNC: 111 MG/DL (ref 70–110)
HCT VFR BLD AUTO: 27 % (ref 37–48.5)
HGB BLD-MCNC: 8.6 G/DL (ref 12–16)
IMM GRANULOCYTES # BLD AUTO: 0.06 K/UL (ref 0–0.04)
IMM GRANULOCYTES NFR BLD AUTO: 1 % (ref 0–0.5)
LYMPHOCYTES # BLD AUTO: 0.7 K/UL (ref 1–4.8)
LYMPHOCYTES NFR BLD: 10.9 % (ref 18–48)
MAGNESIUM SERPL-MCNC: 2.5 MG/DL (ref 1.6–2.6)
MCH RBC QN AUTO: 30.6 PG (ref 27–31)
MCHC RBC AUTO-ENTMCNC: 31.9 G/DL (ref 32–36)
MCV RBC AUTO: 96 FL (ref 82–98)
MONOCYTES # BLD AUTO: 0.4 K/UL (ref 0.3–1)
MONOCYTES NFR BLD: 6.2 % (ref 4–15)
NEUTROPHILS # BLD AUTO: 4.8 K/UL (ref 1.8–7.7)
NEUTROPHILS NFR BLD: 77.5 % (ref 38–73)
NRBC BLD-RTO: 0 /100 WBC
PHOSPHATE SERPL-MCNC: 3.4 MG/DL (ref 2.7–4.5)
PLATELET # BLD AUTO: 299 K/UL (ref 150–350)
PMV BLD AUTO: 10 FL (ref 9.2–12.9)
POCT GLUCOSE: 106 MG/DL (ref 70–110)
POCT GLUCOSE: 108 MG/DL (ref 70–110)
POCT GLUCOSE: 130 MG/DL (ref 70–110)
POCT GLUCOSE: 133 MG/DL (ref 70–110)
POCT GLUCOSE: 98 MG/DL (ref 70–110)
POTASSIUM SERPL-SCNC: 3.9 MMOL/L (ref 3.5–5.1)
PROT SERPL-MCNC: 5.4 G/DL (ref 6–8.4)
RBC # BLD AUTO: 2.81 M/UL (ref 4–5.4)
SODIUM SERPL-SCNC: 132 MMOL/L (ref 136–145)
WBC # BLD AUTO: 6.13 K/UL (ref 3.9–12.7)

## 2020-03-15 PROCEDURE — 94770 HC EXHALED C02 TEST: CPT

## 2020-03-15 PROCEDURE — 63600175 PHARM REV CODE 636 W HCPCS: Performed by: STUDENT IN AN ORGANIZED HEALTH CARE EDUCATION/TRAINING PROGRAM

## 2020-03-15 PROCEDURE — 25000242 PHARM REV CODE 250 ALT 637 W/ HCPCS: Performed by: NURSE PRACTITIONER

## 2020-03-15 PROCEDURE — 99900035 HC TECH TIME PER 15 MIN (STAT)

## 2020-03-15 PROCEDURE — 27000646 HC AEROBIKA DEVICE

## 2020-03-15 PROCEDURE — 94640 AIRWAY INHALATION TREATMENT: CPT

## 2020-03-15 PROCEDURE — 36415 COLL VENOUS BLD VENIPUNCTURE: CPT

## 2020-03-15 PROCEDURE — 25000003 PHARM REV CODE 250: Performed by: STUDENT IN AN ORGANIZED HEALTH CARE EDUCATION/TRAINING PROGRAM

## 2020-03-15 PROCEDURE — 85025 COMPLETE CBC W/AUTO DIFF WBC: CPT

## 2020-03-15 PROCEDURE — 20600001 HC STEP DOWN PRIVATE ROOM

## 2020-03-15 PROCEDURE — 84100 ASSAY OF PHOSPHORUS: CPT

## 2020-03-15 PROCEDURE — 83735 ASSAY OF MAGNESIUM: CPT

## 2020-03-15 PROCEDURE — 94761 N-INVAS EAR/PLS OXIMETRY MLT: CPT

## 2020-03-15 PROCEDURE — 80053 COMPREHEN METABOLIC PANEL: CPT

## 2020-03-15 PROCEDURE — C9113 INJ PANTOPRAZOLE SODIUM, VIA: HCPCS | Performed by: STUDENT IN AN ORGANIZED HEALTH CARE EDUCATION/TRAINING PROGRAM

## 2020-03-15 PROCEDURE — 25000003 PHARM REV CODE 250: Performed by: NURSE PRACTITIONER

## 2020-03-15 PROCEDURE — 94664 DEMO&/EVAL PT USE INHALER: CPT

## 2020-03-15 RX ORDER — DIPHENHYDRAMINE HYDROCHLORIDE 50 MG/ML
12.5 INJECTION INTRAMUSCULAR; INTRAVENOUS EVERY 8 HOURS PRN
Status: DISCONTINUED | OUTPATIENT
Start: 2020-03-15 | End: 2020-03-26 | Stop reason: HOSPADM

## 2020-03-15 RX ADMIN — PANTOPRAZOLE SODIUM 40 MG: 40 INJECTION, POWDER, FOR SOLUTION INTRAVENOUS at 09:03

## 2020-03-15 RX ADMIN — DIPHENHYDRAMINE HYDROCHLORIDE 12.5 MG: 50 INJECTION, SOLUTION INTRAMUSCULAR; INTRAVENOUS at 08:03

## 2020-03-15 RX ADMIN — DEXTROSE MONOHYDRATE, SODIUM CHLORIDE, AND POTASSIUM CHLORIDE: 50; 4.5; 1.49 INJECTION, SOLUTION INTRAVENOUS at 04:03

## 2020-03-15 RX ADMIN — LEVALBUTEROL HYDROCHLORIDE 0.63 MG: 0.63 SOLUTION RESPIRATORY (INHALATION) at 08:03

## 2020-03-15 RX ADMIN — LEVOTHYROXINE SODIUM ANHYDROUS 25 MCG: 100 INJECTION, POWDER, LYOPHILIZED, FOR SOLUTION INTRAVENOUS at 09:03

## 2020-03-15 RX ADMIN — LEVALBUTEROL HYDROCHLORIDE 0.63 MG: 0.63 SOLUTION RESPIRATORY (INHALATION) at 09:03

## 2020-03-15 RX ADMIN — Medication: at 12:03

## 2020-03-15 RX ADMIN — ENOXAPARIN SODIUM 40 MG: 100 INJECTION SUBCUTANEOUS at 05:03

## 2020-03-15 RX ADMIN — LEVALBUTEROL HYDROCHLORIDE 0.63 MG: 0.63 SOLUTION RESPIRATORY (INHALATION) at 02:03

## 2020-03-15 RX ADMIN — PANTOPRAZOLE SODIUM 40 MG: 40 INJECTION, POWDER, FOR SOLUTION INTRAVENOUS at 08:03

## 2020-03-15 RX ADMIN — DEXTROSE MONOHYDRATE, SODIUM CHLORIDE, AND POTASSIUM CHLORIDE: 50; 4.5; 1.49 INJECTION, SOLUTION INTRAVENOUS at 06:03

## 2020-03-15 RX ADMIN — Medication: at 01:03

## 2020-03-15 NOTE — PLAN OF CARE
POC reviewed with patient, states understanding. AOx4. VS WDL. Remains NPO. Nephrostomy tube WDL, adequate output. ML incision WDL. G tube WDL, clamped/unclamped per order. No complaints of nausea. Pain effectively managed per PCA pump. IVF infusing per order. Voids per bedside commode with assistance. No BM this shift. Will continue to manage POC.

## 2020-03-15 NOTE — PROGRESS NOTES
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  Pt has been having abdominal pain since Saturday.  Transferred after CT findings.  Reportedly had been responding to neoadjuvant chemo based on last PET    Post-Op Info:  Procedure(s) (LRB):  LAPAROTOMY, EXPLORATORY (N/A)  CLOSURE, ULCER, PERFORATED, DUODENUM, USING OMENTAL PATCH  BIOPSY, LIVER  INSERTION, PEG TUBE   5 Days Post-Op     Interval History: No acute events overnight. AF and VSS. Her G tube was vented q6 hours with 500 cc out since yesterday morning. She is not passing gas yet.     Medications:  Continuous Infusions:   dextrose 5 % and 0.45 % NaCl with KCl 20 mEq 80 mL/hr at 03/15/20 0414    hydromorphone in 0.9 % NaCl 6 mg/30 ml       Scheduled Meds:   enoxaparin  40 mg Subcutaneous Daily    levalbuterol  0.63 mg Nebulization Q6H WAKE    levothyroxine  25 mcg Intravenous Daily    pantoprazole  40 mg Intravenous BID     PRN Meds:Dextrose 10% Bolus, Dextrose 10% Bolus, glucagon (human recombinant), influenza, insulin aspart U-100, naloxone, ondansetron, sodium chloride 0.9%     Review of patient's allergies indicates:   Allergen Reactions    Azithromycin Other (See Comments)     Stomach pain     Codeine Hallucinations     Objective:     Vital Signs (Most Recent):  Temp: 97.6 °F (36.4 °C) (03/15/20 0342)  Pulse: 87 (03/15/20 0400)  Resp: 16 (03/15/20 0342)  BP: 108/65 (03/15/20 0342)  SpO2: 97 % (03/15/20 0400) Vital Signs (24h Range):  Temp:  [96.8 °F (36 °C)-98.3 °F (36.8 °C)] 97.6 °F (36.4 °C)  Pulse:  [] 87  Resp:  [12-18] 16  SpO2:  [84 %-100 %] 97 %  BP: (101-122)/(62-72) 108/65     Weight: 47 kg (103 lb 9.9 oz)  Body mass index is 18.95 kg/m².    Intake/Output - Last 3 Shifts        03/13 0700 - 03/14 0659 03/14 0700 - 03/15 0659    I.V. (mL/kg) 1918.7 (40.8) 3490 (74.3)    IV Piggyback 500     Total Intake(mL/kg) 2418.7 (51.5) 3490 (74.3)    Urine (mL/kg/hr) 1300 (1.2) 2050 (1.8)    Emesis/NG output 0 0    Drains 1300 500    Other 0 0    Stool 0 0    Total Output 2600 2550    Net -181.3 +940          Urine Occurrence 3 x 7 x    Stool Occurrence 0 x 0 x    Emesis Occurrence 0 x 0 x        Physical Exam   Constitutional: She is oriented to person, place, and time.   Cardiovascular: Normal rate and regular rhythm.   Pulmonary/Chest: Effort normal. No respiratory distress.   Abdominal: Soft.   Midline incision c/d/i  G tube in LUQ  R nephrostomy tube   Neurological: She is alert and oriented to person, place, and time.   Psychiatric: She has a normal mood and affect.   Nursing note and vitals reviewed.    Significant Labs:  CBC:   Recent Labs   Lab 03/14/20  0410   WBC 5.89   RBC 2.46*   HGB 7.8*   HCT 24.3*      MCV 99*   MCH 31.7*   MCHC 32.1     CMP:   Recent Labs   Lab 03/14/20  0410 03/14/20  1133   * 118*   CALCIUM 7.9* 8.1*   ALBUMIN 1.8*  --    PROT 5.2*  --    * 130*   K 4.8 3.6   CO2 24 23   CL 99 100   BUN 8 7*   CREATININE 0.7 0.7   ALKPHOS 511*  --    ALT 30  --    AST 25  --    BILITOT 1.8*  --        Significant Diagnostics:  I have reviewed and interpreted all pertinent imaging results/findings within the past 24 hours.     Assessment/Plan:     * Pneumoperitoneum  75 y/o F with pancreatic cancer with plans to undergo Whipple next week with right nephrectomy and caval reconstruction with Dr. Wall. Transferred in with pneumoperitoneum. S/p exlap with repair of duodenal perforation and peritoneal biopsies on 3/10. UGI on 3/14 demonstrating no evidence of leak.     -NPO, possibly allow sips later today  -Clamp G-tube, unclamp q6 hr or for nausea  -PT/OT; OOB to chair  -mIVF  -SCDs/lovenox/protonix  - Pain meds prn  - Antiemetics prn    Dispo: [facility]  care.        Stefano Robledo MD  General Surgery  [facility]

## 2020-03-15 NOTE — PROGRESS NOTES
"  [facility]  Adult Nutrition  Consult Note    SUMMARY     Recommendations    1. When/if medically feasible, initiate enteral nutrition via PEG. Rec'd Isosource 1.5 @ 45 mL/hr to provide 1620 kcals, 73 g of protein, 825 mL fluid.  2. ADAT, per MD discretion.   3. RD to monitor & follow-up.    Goals: Meet % EEN, EPN by RD f/u date  Nutrition Goal Status: new  Communication of RD Recs: reviewed with RN    Reason for Assessment    Reason For Assessment: NPO/clear liquids x 5 days  Diagnosis: other (see comments)(Pneumonperitoneum, pancreatic adencarcinoma)  Interdisciplinary Rounds: did not attend    General Information Comments: 5 days post-op ex-lap, ulcer closure, PEG tube insertion. Planning for Whipple next week. Pt NPO x 5 days, stating she is hungry at time of visit. PTA, pt reports decreased PO intake (unsure of time frame) & UBW ~ 125# (-17%). Chart review confirms wt history. NFPE complete - pt meets criteria for severe malnutrition. Please see PES statement for details.  Nutrition Discharge Planning: Unable to determine    Nutrition/Diet History    Patient Reported Diet/Restrictions/Preferences: general  Spiritual, Cultural Beliefs, Rastafari Practices, Values that Affect Care: no  Factors Affecting Nutritional Intake: NPO, altered gastrointestinal function    Anthropometrics    Temp: 98 °F (36.7 °C)  Height Method: Stated  Height: 5' 2" (157.5 cm)  Height (inches): 62 in  Weight Method: Bed Scale  Weight: 47 kg (103 lb 9.9 oz)  Weight (lb): 103.62 lb  Ideal Body Weight (IBW), Female: 110 lb  % Ideal Body Weight, Female (lb): 94.2 %  BMI (Calculated): 18.9  BMI Grade: 18.5-24.9 - normal  Usual Body Weight (UBW), k.8 kg  % Usual Body Weight: 82.92  % Weight Change From Usual Weight: -17.25 %    Lab/Procedures/Meds    Pertinent Labs Reviewed: reviewed  Pertinent Labs Comments: Na 130  Pertinent Medications Reviewed: reviewed  Pertinent Medications Comments: " D5    Estimated/Assessed Needs    Weight Used For Calorie Calculations: 47 kg (103 lb 9.9 oz)     Energy Calorie Requirements (kcal): 7549-4916 kcal/d  Energy Need Method: Kcal/kg(30-35 kcal/kg)     Protein Requirements: 57-71 g/d (1.2-1.5 g/kg)  Weight Used For Protein Calculations: 47 kg (103 lb 9.9 oz)     Estimated Fluid Requirement Method: other (see comments)(Per MD or 1 mL/kcal)    Nutrition Prescription Ordered    Current Diet Order: NPO x 5 days    Evaluation of Received Nutrient/Fluid Intake    Comments: LBM: 3/8    Nutrition Risk    Level of Risk/Frequency of Follow-up: (2x/week)     Assessment and Plan    Severe protein-calorie malnutrition    Nutrition Problem:  Severe Protein-Calorie Malnutrition  Malnutrition in the context of Chronic Illness/Injury    Related to (etiology):  Inability to consume sufficient energy    Signs and Symptoms (as evidenced by):  Energy Intake: <75% of estimated energy requirement for > 3 months  Body Fat Depletion: severe depletion of orbitals, triceps and thoracic and lumbar region   Muscle Mass Depletion: severe depletion of temples, clavicle region, scapular region and interosseous muscle   Weight Loss: 17% x 6 months     Interventions(treatment strategy):  Collaboration of nutrition care w/ other providers    Nutrition Diagnosis Status:  New     Monitor and Evaluation    Food and Nutrient Intake: energy intake, food and beverage intake, enteral nutrition intake  Food and Nutrient Adminstration: diet order, enteral and parenteral nutrition administration  Physical Activity and Function: nutrition-related ADLs and IADLs  Anthropometric Measurements: weight, weight change  Biochemical Data, Medical Tests and Procedures: lipid profile, inflammatory profile, glucose/endocrine profile, electrolyte and renal panel, gastrointestinal profile  Nutrition-Focused Physical Findings: overall appearance     Malnutrition Assessment    Weight Loss (Malnutrition): greater than 10% in 6  months  Energy Intake (Malnutrition): less than 75% for greater than or equal to 3 months  Subcutaneous Fat (Malnutrition): severe depletion  Muscle Mass (Malnutrition): severe depletion   Orbital Region (Subcutaneous Fat Loss): severe depletion  Upper Arm Region (Subcutaneous Fat Loss): severe depletion  Thoracic and Lumbar Region: severe depletion   Orthodox Region (Muscle Loss): severe depletion  Clavicle Bone Region (Muscle Loss): severe depletion  Scapular Bone Region (Muscle Loss): severe depletion  Dorsal Hand (Muscle Loss): severe depletion  Anterior Thigh Region (Muscle Loss): other (see comments)(ZAIRE)  Posterior Calf Region (Muscle Loss): other (see comments)(ZAIRE)     Nutrition Follow-Up    RD Follow-up?: Yes

## 2020-03-15 NOTE — PLAN OF CARE
Recommendations     1. When/if medically feasible, initiate enteral nutrition via PEG. Rec'd Isosource 1.5 @ 45 mL/hr to provide 1620 kcals, 73 g of protein, 825 mL fluid.  2. ADAT, per MD discretion.   3. RD to monitor & follow-up.     Goals: Meet % EEN, EPN by RD f/u date  Nutrition Goal Status: new  Communication of RD Recs: reviewed with RN

## 2020-03-15 NOTE — ASSESSMENT & PLAN NOTE
75 y/o F with pancreatic cancer with plans to undergo Whipple next week with right nephrectomy and caval reconstruction with Dr. Wall. Transferred in with pneumoperitoneum. S/p exlap with repair of duodenal perforation and peritoneal biopsies on 3/10. UGI on 3/14 demonstrating no evidence of leak.     -NPO, possibly allow sips later today  -Clamp G-tube, unclamp q6 hr or for nausea  -PT/OT; OOB to chair  -mIVF  -SCDs/lovenox/protonix  - Pain meds prn  - Antiemetics prn    Dispo: Continue [facility] care.

## 2020-03-15 NOTE — SUBJECTIVE & OBJECTIVE
Interval History: No acute events overnight. AF and VSS. Her G tube was vented q6 hours with 500 cc out since yesterday morning. She is not passing gas yet.     Medications:  Continuous Infusions:   dextrose 5 % and 0.45 % NaCl with KCl 20 mEq 80 mL/hr at 03/15/20 0414    hydromorphone in 0.9 % NaCl 6 mg/30 ml       Scheduled Meds:   enoxaparin  40 mg Subcutaneous Daily    levalbuterol  0.63 mg Nebulization Q6H WAKE    levothyroxine  25 mcg Intravenous Daily    pantoprazole  40 mg Intravenous BID     PRN Meds:Dextrose 10% Bolus, Dextrose 10% Bolus, glucagon (human recombinant), influenza, insulin aspart U-100, naloxone, ondansetron, sodium chloride 0.9%     Review of patient's allergies indicates:   Allergen Reactions    Azithromycin Other (See Comments)     Stomach pain     Codeine Hallucinations     Objective:     Vital Signs (Most Recent):  Temp: 97.6 °F (36.4 °C) (03/15/20 0342)  Pulse: 87 (03/15/20 0400)  Resp: 16 (03/15/20 0342)  BP: 108/65 (03/15/20 0342)  SpO2: 97 % (03/15/20 0400) Vital Signs (24h Range):  Temp:  [96.8 °F (36 °C)-98.3 °F (36.8 °C)] 97.6 °F (36.4 °C)  Pulse:  [] 87  Resp:  [12-18] 16  SpO2:  [84 %-100 %] 97 %  BP: (101-122)/(62-72) 108/65     Weight: 47 kg (103 lb 9.9 oz)  Body mass index is 18.95 kg/m².    Intake/Output - Last 3 Shifts       03/13 0700 - 03/14 0659 03/14 0700 - 03/15 0659    I.V. (mL/kg) 1918.7 (40.8) 3490 (74.3)    IV Piggyback 500     Total Intake(mL/kg) 2418.7 (51.5) 3490 (74.3)    Urine (mL/kg/hr) 1300 (1.2) 2050 (1.8)    Emesis/NG output 0 0    Drains 1300 500    Other 0 0    Stool 0 0    Total Output 2600 2550    Net -181.3 +940          Urine Occurrence 3 x 7 x    Stool Occurrence 0 x 0 x    Emesis Occurrence 0 x 0 x        Physical Exam   Constitutional: She is oriented to person, place, and time.   Cardiovascular: Normal rate and regular rhythm.   Pulmonary/Chest: Effort normal. No respiratory distress.   Abdominal: Soft.   Midline incision c/d/i  G  tube in LUQ  R nephrostomy tube   Neurological: She is alert and oriented to person, place, and time.   Psychiatric: She has a normal mood and affect.   Nursing note and vitals reviewed.    Significant Labs:  CBC:   Recent Labs   Lab 03/14/20 0410   WBC 5.89   RBC 2.46*   HGB 7.8*   HCT 24.3*      MCV 99*   MCH 31.7*   MCHC 32.1     CMP:   Recent Labs   Lab 03/14/20 0410 03/14/20  1133   * 118*   CALCIUM 7.9* 8.1*   ALBUMIN 1.8*  --    PROT 5.2*  --    * 130*   K 4.8 3.6   CO2 24 23   CL 99 100   BUN 8 7*   CREATININE 0.7 0.7   ALKPHOS 511*  --    ALT 30  --    AST 25  --    BILITOT 1.8*  --        Significant Diagnostics:  I have reviewed and interpreted all pertinent imaging results/findings within the past 24 hours.

## 2020-03-15 NOTE — ASSESSMENT & PLAN NOTE
Nutrition Problem:  Severe Protein-Calorie Malnutrition  Malnutrition in the context of Chronic Illness/Injury    Related to (etiology):  Inability to consume sufficient energy    Signs and Symptoms (as evidenced by):  Energy Intake: <75% of estimated energy requirement for > 3 months  Body Fat Depletion: severe depletion of orbitals, triceps and thoracic and lumbar region   Muscle Mass Depletion: severe depletion of temples, clavicle region, scapular region and interosseous muscle   Weight Loss: 17% x 6 months     Interventions (treatment strategy):  Collaboration of nutrition care w/ other providers  [employer] - Boost Breeze    Nutrition Diagnosis Status:  Continues

## 2020-03-16 LAB
ALBUMIN SERPL BCP-MCNC: 2 G/DL (ref 3.5–5.2)
ALP SERPL-CCNC: 723 U/L (ref 55–135)
ALT SERPL W/O P-5'-P-CCNC: 29 U/L (ref 10–44)
ANION GAP SERPL CALC-SCNC: 4 MMOL/L (ref 8–16)
AST SERPL-CCNC: 28 U/L (ref 10–40)
BASOPHILS # BLD AUTO: 0.01 K/UL (ref 0–0.2)
BASOPHILS NFR BLD: 0.2 % (ref 0–1.9)
BILIRUB SERPL-MCNC: 1.5 MG/DL (ref 0.1–1)
BUN SERPL-MCNC: 3 MG/DL (ref 8–23)
CALCIUM SERPL-MCNC: 8.5 MG/DL (ref 8.7–10.5)
CHLORIDE SERPL-SCNC: 99 MMOL/L (ref 95–110)
CO2 SERPL-SCNC: 28 MMOL/L (ref 23–29)
CREAT SERPL-MCNC: 0.7 MG/DL (ref 0.5–1.4)
DIFFERENTIAL METHOD: ABNORMAL
EOSINOPHIL # BLD AUTO: 0.2 K/UL (ref 0–0.5)
EOSINOPHIL NFR BLD: 3.1 % (ref 0–8)
ERYTHROCYTE [DISTWIDTH] IN BLOOD BY AUTOMATED COUNT: 16.1 % (ref 11.5–14.5)
EST. GFR  (AFRICAN AMERICAN): >60 ML/MIN/1.73 M^2
EST. GFR  (NON AFRICAN AMERICAN): >60 ML/MIN/1.73 M^2
GLUCOSE SERPL-MCNC: 106 MG/DL (ref 70–110)
HCT VFR BLD AUTO: 25.6 % (ref 37–48.5)
HGB BLD-MCNC: 8.1 G/DL (ref 12–16)
IMM GRANULOCYTES # BLD AUTO: 0.06 K/UL (ref 0–0.04)
IMM GRANULOCYTES NFR BLD AUTO: 1 % (ref 0–0.5)
LYMPHOCYTES # BLD AUTO: 0.6 K/UL (ref 1–4.8)
LYMPHOCYTES NFR BLD: 10 % (ref 18–48)
MAGNESIUM SERPL-MCNC: 1.7 MG/DL (ref 1.6–2.6)
MCH RBC QN AUTO: 30.6 PG (ref 27–31)
MCHC RBC AUTO-ENTMCNC: 31.6 G/DL (ref 32–36)
MCV RBC AUTO: 97 FL (ref 82–98)
MONOCYTES # BLD AUTO: 0.4 K/UL (ref 0.3–1)
MONOCYTES NFR BLD: 6.6 % (ref 4–15)
NEUTROPHILS # BLD AUTO: 4.8 K/UL (ref 1.8–7.7)
NEUTROPHILS NFR BLD: 79.1 % (ref 38–73)
NRBC BLD-RTO: 0 /100 WBC
PHOSPHATE SERPL-MCNC: 3.6 MG/DL (ref 2.7–4.5)
PLATELET # BLD AUTO: 298 K/UL (ref 150–350)
PMV BLD AUTO: 10 FL (ref 9.2–12.9)
POCT GLUCOSE: 110 MG/DL (ref 70–110)
POCT GLUCOSE: 113 MG/DL (ref 70–110)
POCT GLUCOSE: 118 MG/DL (ref 70–110)
POTASSIUM SERPL-SCNC: 4 MMOL/L (ref 3.5–5.1)
PROT SERPL-MCNC: 5.6 G/DL (ref 6–8.4)
RBC # BLD AUTO: 2.65 M/UL (ref 4–5.4)
SODIUM SERPL-SCNC: 131 MMOL/L (ref 136–145)
WBC # BLD AUTO: 6.1 K/UL (ref 3.9–12.7)

## 2020-03-16 PROCEDURE — 63600175 PHARM REV CODE 636 W HCPCS: Performed by: STUDENT IN AN ORGANIZED HEALTH CARE EDUCATION/TRAINING PROGRAM

## 2020-03-16 PROCEDURE — 94799 UNLISTED PULMONARY SVC/PX: CPT

## 2020-03-16 PROCEDURE — C9113 INJ PANTOPRAZOLE SODIUM, VIA: HCPCS | Performed by: STUDENT IN AN ORGANIZED HEALTH CARE EDUCATION/TRAINING PROGRAM

## 2020-03-16 PROCEDURE — 80053 COMPREHEN METABOLIC PANEL: CPT

## 2020-03-16 PROCEDURE — 25000003 PHARM REV CODE 250: Performed by: STUDENT IN AN ORGANIZED HEALTH CARE EDUCATION/TRAINING PROGRAM

## 2020-03-16 PROCEDURE — 84100 ASSAY OF PHOSPHORUS: CPT

## 2020-03-16 PROCEDURE — 94761 N-INVAS EAR/PLS OXIMETRY MLT: CPT

## 2020-03-16 PROCEDURE — 63700000 PHARM REV CODE 250 ALT 637 W/O HCPCS: Performed by: STUDENT IN AN ORGANIZED HEALTH CARE EDUCATION/TRAINING PROGRAM

## 2020-03-16 PROCEDURE — 97116 GAIT TRAINING THERAPY: CPT | Mod: CQ

## 2020-03-16 PROCEDURE — 20600001 HC STEP DOWN PRIVATE ROOM

## 2020-03-16 PROCEDURE — 25000242 PHARM REV CODE 250 ALT 637 W/ HCPCS: Performed by: NURSE PRACTITIONER

## 2020-03-16 PROCEDURE — 97530 THERAPEUTIC ACTIVITIES: CPT | Mod: CQ

## 2020-03-16 PROCEDURE — 83735 ASSAY OF MAGNESIUM: CPT

## 2020-03-16 PROCEDURE — 94640 AIRWAY INHALATION TREATMENT: CPT

## 2020-03-16 PROCEDURE — 99900035 HC TECH TIME PER 15 MIN (STAT)

## 2020-03-16 PROCEDURE — 94664 DEMO&/EVAL PT USE INHALER: CPT

## 2020-03-16 PROCEDURE — 25000003 PHARM REV CODE 250: Performed by: GENERAL PRACTICE

## 2020-03-16 PROCEDURE — 85025 COMPLETE CBC W/AUTO DIFF WBC: CPT

## 2020-03-16 RX ORDER — OXYCODONE HCL 5 MG/5 ML
10 SOLUTION, ORAL ORAL EVERY 4 HOURS PRN
Status: DISCONTINUED | OUTPATIENT
Start: 2020-03-16 | End: 2020-03-18

## 2020-03-16 RX ORDER — LEVOTHYROXINE SODIUM 25 UG/1
50 TABLET ORAL
Status: DISCONTINUED | OUTPATIENT
Start: 2020-03-17 | End: 2020-03-26 | Stop reason: HOSPADM

## 2020-03-16 RX ORDER — ACETAMINOPHEN 500 MG
1000 TABLET ORAL EVERY 8 HOURS
Status: DISCONTINUED | OUTPATIENT
Start: 2020-03-16 | End: 2020-03-20

## 2020-03-16 RX ORDER — KETOROLAC TROMETHAMINE 15 MG/ML
15 INJECTION, SOLUTION INTRAMUSCULAR; INTRAVENOUS EVERY 6 HOURS
Status: DISCONTINUED | OUTPATIENT
Start: 2020-03-16 | End: 2020-03-16

## 2020-03-16 RX ORDER — OXYCODONE HCL 5 MG/5 ML
5 SOLUTION, ORAL ORAL EVERY 4 HOURS PRN
Status: DISCONTINUED | OUTPATIENT
Start: 2020-03-16 | End: 2020-03-17

## 2020-03-16 RX ADMIN — OXYCODONE HYDROCHLORIDE 10 MG: 5 SOLUTION ORAL at 05:03

## 2020-03-16 RX ADMIN — LEVALBUTEROL HYDROCHLORIDE 0.63 MG: 0.63 SOLUTION RESPIRATORY (INHALATION) at 08:03

## 2020-03-16 RX ADMIN — ENOXAPARIN SODIUM 40 MG: 100 INJECTION SUBCUTANEOUS at 05:03

## 2020-03-16 RX ADMIN — PANTOPRAZOLE SODIUM 40 MG: 40 INJECTION, POWDER, FOR SOLUTION INTRAVENOUS at 09:03

## 2020-03-16 RX ADMIN — DIPHENHYDRAMINE HYDROCHLORIDE 12.5 MG: 50 INJECTION, SOLUTION INTRAMUSCULAR; INTRAVENOUS at 06:03

## 2020-03-16 RX ADMIN — ACETAMINOPHEN 1000 MG: 500 TABLET ORAL at 09:03

## 2020-03-16 RX ADMIN — Medication: at 02:03

## 2020-03-16 RX ADMIN — OXYCODONE HYDROCHLORIDE 10 MG: 5 SOLUTION ORAL at 09:03

## 2020-03-16 RX ADMIN — ACETAMINOPHEN 1000 MG: 500 TABLET ORAL at 03:03

## 2020-03-16 RX ADMIN — OXYCODONE HYDROCHLORIDE 10 MG: 5 SOLUTION ORAL at 01:03

## 2020-03-16 RX ADMIN — LEVOTHYROXINE SODIUM ANHYDROUS 25 MCG: 100 INJECTION, POWDER, LYOPHILIZED, FOR SOLUTION INTRAVENOUS at 09:03

## 2020-03-16 RX ADMIN — LEVALBUTEROL HYDROCHLORIDE 0.63 MG: 0.63 SOLUTION RESPIRATORY (INHALATION) at 02:03

## 2020-03-16 RX ADMIN — DEXTROSE MONOHYDRATE, SODIUM CHLORIDE, AND POTASSIUM CHLORIDE: 50; 4.5; 1.49 INJECTION, SOLUTION INTRAVENOUS at 05:03

## 2020-03-16 NOTE — ASSESSMENT & PLAN NOTE
73 y/o F with pancreatic cancer with plans to undergo Whipple next week with right nephrectomy and caval reconstruction with Dr. Wall. Transferred in with pneumoperitoneum. S/p exlap with repair of duodenal perforation and peritoneal biopsies on 3/10. UGI on 3/14 demonstrating no evidence of leak.     -CLD  -Add Boost Breeze  -Clamp G-tube, unclamp prn for nausea  -PT/OT; OOB to chair  -mIVF  -SCDs/lovenox/protonix  -Pain meds prn, transition to PO  -Antiemetics prn    Dispo: Continue [facility] care, likely discharge to SNF

## 2020-03-16 NOTE — PLAN OF CARE
Patient is POD #6 from Exploratory Laparotomy, Closure Perforated Duodenal Ulcer using Omental Patch, Liver Biopsy, G tube placement.  Patient is expected to discharge to SNF +/- 3/18/2020.  Will continue to follow for needs.       03/16/20 1402   Discharge Reassessment   Assessment Type Discharge Planning Reassessment   Discharge Plan A Skilled Nursing Facility   Discharge Plan B Home with family;Home Health   Post-Acute Status   Post-Acute Authorization Placement   Post-Acute Placement Status Pending Post-Acute Clinical Review   Discharge Delays (!) Change in Medical Condition

## 2020-03-16 NOTE — PLAN OF CARE
POC reviewed with patient, states understanding. AOx4. VS WDL. Clear liquid diet, tolerated well. Nephrostomy tube WDL, adequate output. ML incision WDL. G tube WDL, clamped/unclamped per order. No complaints of nausea. Pain effectively managed per PCA pump. IVF infusing per order. Voids per bedside commode with stand by assit. No BM this shift. Will continue to manage POC.

## 2020-03-16 NOTE — PROGRESS NOTES
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  Pt has been having abdominal pain since Saturday.  Transferred after CT findings.  Reportedly had been responding to neoadjuvant chemo based on last PET    Post-Op Info:  Procedure(s) (LRB):  LAPAROTOMY, EXPLORATORY (N/A)  CLOSURE, ULCER, PERFORATED, DUODENUM, USING OMENTAL PATCH  BIOPSY, LIVER  INSERTION, PEG TUBE   6 Days Post-Op     Interval History: No acute events overnight. AF and VSS. She tolerated a CLD without nausea or vomiting. She has not yet passed gas or BM.     Medications:  Continuous Infusions:   dextrose 5 % and 0.45 % NaCl with KCl 20 mEq 50 mL/hr at 03/15/20 1803     Scheduled Meds:   enoxaparin  40 mg Subcutaneous Daily    levalbuterol  0.63 mg Nebulization Q6H WAKE    levothyroxine  25 mcg Intravenous Daily    pantoprazole  40 mg Intravenous BID     PRN Meds:Dextrose 10% Bolus, Dextrose 10% Bolus, diphenhydrAMINE, glucagon (human recombinant), influenza, insulin aspart U-100, ondansetron, oxyCODONE, oxyCODONE, sodium chloride 0.9%     Review of patient's allergies indicates:   Allergen Reactions    Azithromycin Other (See Comments)     Stomach pain     Codeine Hallucinations     Objective:     Vital Signs (Most Recent):  Temp: 98.7 °F (37.1 °C) (03/16/20 0816)  Pulse: 81 (03/16/20 0837)  Resp: (!) 22 (03/16/20 0837)  BP: 129/79 (03/16/20 0816)  SpO2: 99 % (03/16/20 0837) Vital Signs (24h Range):  Temp:  [97.7 °F (36.5 °C)-99.3 °F (37.4 °C)] 98.7 °F (37.1 °C)  Pulse:  [75-93] 81  Resp:  [9-22] 22  SpO2:  [93 %-99 %] 99 %  BP: ()/(63-79) 129/79     Weight: 47 kg (103 lb 9.9 oz)  Body mass index is 18.95 kg/m².    Intake/Output - Last 3 Shifts       03/14 0700 - 03/15 0659  03/15 0700 - 03/16 0659 03/16 0700 - 03/17 0659    P.O.  240     I.V. (mL/kg) 3490 (74.3) 1394 (29.7)     IV Piggyback       Total Intake(mL/kg) 3490 (74.3) 1634 (34.8)     Urine (mL/kg/hr) 2050 (1.8) 2950 (2.6)     Emesis/NG output 0      Drains 500 225     Other 0      Stool 0      Total Output 2550 3175     Net +940 -1541            Urine Occurrence 7 x 5 x     Stool Occurrence 0 x      Emesis Occurrence 0 x          Physical Exam   Constitutional: She is oriented to person, place, and time.   Cardiovascular: Normal rate and regular rhythm.   Pulmonary/Chest: Effort normal. No respiratory distress.   Abdominal: Soft.   Midline incision c/d/i  G tube in LUQ  R nephrostomy tube   Neurological: She is alert and oriented to person, place, and time.   Psychiatric: She has a normal mood and affect.   Nursing note and vitals reviewed.    Significant Labs:  CBC:   Recent Labs   Lab 03/16/20  0315   WBC 6.10   RBC 2.65*   HGB 8.1*   HCT 25.6*      MCV 97   MCH 30.6   MCHC 31.6*     CMP:   Recent Labs   Lab 03/16/20  0315      CALCIUM 8.5*   ALBUMIN 2.0*   PROT 5.6*   *   K 4.0   CO2 28   CL 99   BUN 3*   CREATININE 0.7   ALKPHOS 723*   ALT 29   AST 28   BILITOT 1.5*       Significant Diagnostics:  I have reviewed and interpreted all pertinent imaging results/findings within the past 24 hours.     Assessment/Plan:     * Pneumoperitoneum  73 y/o F with pancreatic cancer with plans to undergo Whipple next week with right nephrectomy and caval reconstruction with Dr. Wall. Transferred in with pneumoperitoneum. S/p exlap with repair of duodenal perforation and peritoneal biopsies on 3/10. UGI on 3/14 demonstrating no evidence of leak.     -CLD  -Add Boost Breeze  -Clamp G-tube, unclamp prn for nausea  -PT/OT; OOB to chair  -mIVF  -SCDs/lovenox/protonix  -Pain meds prn, transition to PO  -Antiemetics prn    Dispo: Continue [facility] care, likely discharge to SNF        Stefano Robledo MD  General  Surgery  [facility]

## 2020-03-16 NOTE — PT/OT/SLP PROGRESS
Occupational Therapy      Patient Name:  Julee Hawkins   MRN:  [id number]     Patient not seen today. Chart review performed. Pt remains appropriate for OT services, Will follow-up 3/17/20.    Bess Frost OT  3/16/2020

## 2020-03-16 NOTE — PLAN OF CARE
POC reviewed with pt, verbalized understanding. AAOx4. VSS on RA. Up to BSC, voiding adequately.  R Nephrostomy with adequate UOP. IVF maintained. LLQ G-tube clamped, denies n/v. Pt up to chair for several hours. Passing gas. Pt states pain better managed with PRN meds. No adverse events, WCTM.         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   3/10: Admit to ED from [facility]. Pan cultures. Admit to SICU, Class A to OR for ExLap (perforated ulcer closed, G tube placed), readmitted to SICU extubated w/ no gtts.    Outcome: Ongoing, Progressing  Goal: Absence of Hospital-Acquired Illness or Injury  Outcome: Ongoing, Progressing  Goal: Optimal Comfort and Wellbeing  Outcome: Ongoing, Progressing  Goal: Readiness for Transition of Care  Outcome: Ongoing, Progressing  Goal: Rounds/Family Conference  Outcome: Ongoing, Progressing     Problem: Skin Injury Risk Increased  Goal: Skin Health and Integrity  Outcome: Ongoing, Progressing

## 2020-03-16 NOTE — PLAN OF CARE
03/16/20 1120   Post-Acute Status   Post-Acute Authorization Placement   Post-Acute Placement Status Referrals Sent   Kenya spoke with admissions at [facility]. They are only a long term facility and do not do skilled nursing. KENYA spoke with admissions at [facility] [phone]) and got the correct fax number ([phone]) and hard faxed pt's clinicals.    UPDATE:3:00 PM   Kenya spoke with Janna at [facility]. She stated she had not received fax and that their fax was not working correctly this morning. SW refaxed the referral.    Jaimee Negro, SILVIA  [facility]  [phone]

## 2020-03-16 NOTE — SUBJECTIVE & OBJECTIVE
Interval History: No acute events overnight. AF and VSS. She tolerated a CLD without nausea or vomiting. She has not yet passed gas or BM.     Medications:  Continuous Infusions:   dextrose 5 % and 0.45 % NaCl with KCl 20 mEq 50 mL/hr at 03/15/20 1803     Scheduled Meds:   enoxaparin  40 mg Subcutaneous Daily    levalbuterol  0.63 mg Nebulization Q6H WAKE    levothyroxine  25 mcg Intravenous Daily    pantoprazole  40 mg Intravenous BID     PRN Meds:Dextrose 10% Bolus, Dextrose 10% Bolus, diphenhydrAMINE, glucagon (human recombinant), influenza, insulin aspart U-100, ondansetron, oxyCODONE, oxyCODONE, sodium chloride 0.9%     Review of patient's allergies indicates:   Allergen Reactions    Azithromycin Other (See Comments)     Stomach pain     Codeine Hallucinations     Objective:     Vital Signs (Most Recent):  Temp: 98.7 °F (37.1 °C) (03/16/20 0816)  Pulse: 81 (03/16/20 0837)  Resp: (!) 22 (03/16/20 0837)  BP: 129/79 (03/16/20 0816)  SpO2: 99 % (03/16/20 0837) Vital Signs (24h Range):  Temp:  [97.7 °F (36.5 °C)-99.3 °F (37.4 °C)] 98.7 °F (37.1 °C)  Pulse:  [75-93] 81  Resp:  [9-22] 22  SpO2:  [93 %-99 %] 99 %  BP: ()/(63-79) 129/79     Weight: 47 kg (103 lb 9.9 oz)  Body mass index is 18.95 kg/m².    Intake/Output - Last 3 Shifts       03/14 0700 - 03/15 0659 03/15 0700 - 03/16 0659 03/16 0700 - 03/17 0659    P.O.  240     I.V. (mL/kg) 3490 (74.3) 1394 (29.7)     IV Piggyback       Total Intake(mL/kg) 3490 (74.3) 1634 (34.8)     Urine (mL/kg/hr) 2050 (1.8) 2950 (2.6)     Emesis/NG output 0      Drains 500 225     Other 0      Stool 0      Total Output 2550 3175     Net +940 -1541            Urine Occurrence 7 x 5 x     Stool Occurrence 0 x      Emesis Occurrence 0 x          Physical Exam   Constitutional: She is oriented to person, place, and time.   Cardiovascular: Normal rate and regular rhythm.   Pulmonary/Chest: Effort normal. No respiratory distress.   Abdominal: Soft.   Midline incision c/d/i  G  tube in LUQ  R nephrostomy tube   Neurological: She is alert and oriented to person, place, and time.   Psychiatric: She has a normal mood and affect.   Nursing note and vitals reviewed.    Significant Labs:  CBC:   Recent Labs   Lab 03/16/20 0315   WBC 6.10   RBC 2.65*   HGB 8.1*   HCT 25.6*      MCV 97   MCH 30.6   MCHC 31.6*     CMP:   Recent Labs   Lab 03/16/20 0315      CALCIUM 8.5*   ALBUMIN 2.0*   PROT 5.6*   *   K 4.0   CO2 28   CL 99   BUN 3*   CREATININE 0.7   ALKPHOS 723*   ALT 29   AST 28   BILITOT 1.5*       Significant Diagnostics:  I have reviewed and interpreted all pertinent imaging results/findings within the past 24 hours.

## 2020-03-16 NOTE — PLAN OF CARE
Plan of care reviewed with pt: AAOx4, on RA, VS stable. Midline with staples CDI DELVIS. G tube intact, unclamped at 1330 to 1430 with only 150cc of thick green drainge. Advanced to clear liquid diet today, tolerated it well. No complains of nausea. Pain under control with PCA Dilaudid. Voided with adequate amount clear yellow urine.  Nephrostomy intact with clear yellow urine. No bowel movement. Call light in reach. WCTM.

## 2020-03-16 NOTE — PLAN OF CARE
1 goal met. Goals remain appropriate.     Problem: Physical Therapy Goal  Goal: Physical Therapy Goal  Description  Goals to be met by: 3/25/20    Patient will increase functional independence with mobility by performin. Supine to sit with Stand-by Assistance -met  2. Sit to stand transfer with Supervision -not met  3. Gait  x 150 feet with Contact Guard Assistance using AD if needed.. -not met  4. Ascend/descend 4 stair with no Handrails Contact Guard Assistance . -not met      Outcome: Ongoing, Progressing

## 2020-03-17 LAB
ALBUMIN SERPL BCP-MCNC: 2 G/DL (ref 3.5–5.2)
ALP SERPL-CCNC: 688 U/L (ref 55–135)
ALT SERPL W/O P-5'-P-CCNC: 24 U/L (ref 10–44)
ANION GAP SERPL CALC-SCNC: 5 MMOL/L (ref 8–16)
AST SERPL-CCNC: 30 U/L (ref 10–40)
BASOPHILS # BLD AUTO: 0.01 K/UL (ref 0–0.2)
BASOPHILS NFR BLD: 0.2 % (ref 0–1.9)
BILIRUB SERPL-MCNC: 1.3 MG/DL (ref 0.1–1)
BUN SERPL-MCNC: 2 MG/DL (ref 8–23)
CALCIUM SERPL-MCNC: 8.5 MG/DL (ref 8.7–10.5)
CHLORIDE SERPL-SCNC: 101 MMOL/L (ref 95–110)
CO2 SERPL-SCNC: 27 MMOL/L (ref 23–29)
CREAT SERPL-MCNC: 0.7 MG/DL (ref 0.5–1.4)
DIFFERENTIAL METHOD: ABNORMAL
EOSINOPHIL # BLD AUTO: 0.2 K/UL (ref 0–0.5)
EOSINOPHIL NFR BLD: 3.3 % (ref 0–8)
ERYTHROCYTE [DISTWIDTH] IN BLOOD BY AUTOMATED COUNT: 16.2 % (ref 11.5–14.5)
EST. GFR  (AFRICAN AMERICAN): >60 ML/MIN/1.73 M^2
EST. GFR  (NON AFRICAN AMERICAN): >60 ML/MIN/1.73 M^2
GLUCOSE SERPL-MCNC: 100 MG/DL (ref 70–110)
HCT VFR BLD AUTO: 25.7 % (ref 37–48.5)
HGB BLD-MCNC: 8.1 G/DL (ref 12–16)
IMM GRANULOCYTES # BLD AUTO: 0.04 K/UL (ref 0–0.04)
IMM GRANULOCYTES NFR BLD AUTO: 0.7 % (ref 0–0.5)
LYMPHOCYTES # BLD AUTO: 0.8 K/UL (ref 1–4.8)
LYMPHOCYTES NFR BLD: 14.2 % (ref 18–48)
MAGNESIUM SERPL-MCNC: 1.5 MG/DL (ref 1.6–2.6)
MCH RBC QN AUTO: 30.9 PG (ref 27–31)
MCHC RBC AUTO-ENTMCNC: 31.5 G/DL (ref 32–36)
MCV RBC AUTO: 98 FL (ref 82–98)
MONOCYTES # BLD AUTO: 0.4 K/UL (ref 0.3–1)
MONOCYTES NFR BLD: 7.2 % (ref 4–15)
NEUTROPHILS # BLD AUTO: 4 K/UL (ref 1.8–7.7)
NEUTROPHILS NFR BLD: 74.4 % (ref 38–73)
NRBC BLD-RTO: 0 /100 WBC
PHOSPHATE SERPL-MCNC: 3.6 MG/DL (ref 2.7–4.5)
PLATELET # BLD AUTO: 298 K/UL (ref 150–350)
PMV BLD AUTO: 9.8 FL (ref 9.2–12.9)
POCT GLUCOSE: 107 MG/DL (ref 70–110)
POCT GLUCOSE: 109 MG/DL (ref 70–110)
POCT GLUCOSE: 115 MG/DL (ref 70–110)
POCT GLUCOSE: 120 MG/DL (ref 70–110)
POTASSIUM SERPL-SCNC: 3.9 MMOL/L (ref 3.5–5.1)
PROT SERPL-MCNC: 5.6 G/DL (ref 6–8.4)
RBC # BLD AUTO: 2.62 M/UL (ref 4–5.4)
SODIUM SERPL-SCNC: 133 MMOL/L (ref 136–145)
WBC # BLD AUTO: 5.42 K/UL (ref 3.9–12.7)

## 2020-03-17 PROCEDURE — 94640 AIRWAY INHALATION TREATMENT: CPT

## 2020-03-17 PROCEDURE — 80053 COMPREHEN METABOLIC PANEL: CPT

## 2020-03-17 PROCEDURE — 94799 UNLISTED PULMONARY SVC/PX: CPT

## 2020-03-17 PROCEDURE — 63600175 PHARM REV CODE 636 W HCPCS: Performed by: STUDENT IN AN ORGANIZED HEALTH CARE EDUCATION/TRAINING PROGRAM

## 2020-03-17 PROCEDURE — 25000242 PHARM REV CODE 250 ALT 637 W/ HCPCS: Performed by: NURSE PRACTITIONER

## 2020-03-17 PROCEDURE — 94664 DEMO&/EVAL PT USE INHALER: CPT

## 2020-03-17 PROCEDURE — 25000003 PHARM REV CODE 250: Performed by: STUDENT IN AN ORGANIZED HEALTH CARE EDUCATION/TRAINING PROGRAM

## 2020-03-17 PROCEDURE — 25000003 PHARM REV CODE 250: Performed by: GENERAL PRACTICE

## 2020-03-17 PROCEDURE — 84100 ASSAY OF PHOSPHORUS: CPT

## 2020-03-17 PROCEDURE — 63600175 PHARM REV CODE 636 W HCPCS: Performed by: NURSE PRACTITIONER

## 2020-03-17 PROCEDURE — 83735 ASSAY OF MAGNESIUM: CPT

## 2020-03-17 PROCEDURE — 99900035 HC TECH TIME PER 15 MIN (STAT)

## 2020-03-17 PROCEDURE — 25000003 PHARM REV CODE 250: Performed by: NURSE PRACTITIONER

## 2020-03-17 PROCEDURE — 97530 THERAPEUTIC ACTIVITIES: CPT | Mod: CQ

## 2020-03-17 PROCEDURE — 85025 COMPLETE CBC W/AUTO DIFF WBC: CPT

## 2020-03-17 PROCEDURE — 94761 N-INVAS EAR/PLS OXIMETRY MLT: CPT

## 2020-03-17 PROCEDURE — 97530 THERAPEUTIC ACTIVITIES: CPT

## 2020-03-17 PROCEDURE — 97116 GAIT TRAINING THERAPY: CPT | Mod: CQ

## 2020-03-17 PROCEDURE — 63700000 PHARM REV CODE 250 ALT 637 W/O HCPCS: Performed by: STUDENT IN AN ORGANIZED HEALTH CARE EDUCATION/TRAINING PROGRAM

## 2020-03-17 PROCEDURE — 20600001 HC STEP DOWN PRIVATE ROOM

## 2020-03-17 RX ORDER — PANTOPRAZOLE SODIUM 40 MG/1
40 TABLET, DELAYED RELEASE ORAL
Status: DISCONTINUED | OUTPATIENT
Start: 2020-03-17 | End: 2020-03-26 | Stop reason: HOSPADM

## 2020-03-17 RX ORDER — POTASSIUM CHLORIDE 750 MG/1
10 CAPSULE, EXTENDED RELEASE ORAL ONCE
Status: COMPLETED | OUTPATIENT
Start: 2020-03-17 | End: 2020-03-17

## 2020-03-17 RX ORDER — LANOLIN ALCOHOL/MO/W.PET/CERES
800 CREAM (GRAM) TOPICAL ONCE
Status: COMPLETED | OUTPATIENT
Start: 2020-03-17 | End: 2020-03-17

## 2020-03-17 RX ORDER — OXYCODONE HCL 5 MG/5 ML
7.5 SOLUTION, ORAL ORAL EVERY 4 HOURS PRN
Status: DISCONTINUED | OUTPATIENT
Start: 2020-03-17 | End: 2020-03-18

## 2020-03-17 RX ORDER — FENTANYL 12.5 UG/1
1 PATCH TRANSDERMAL
Status: DISCONTINUED | OUTPATIENT
Start: 2020-03-17 | End: 2020-03-26 | Stop reason: HOSPADM

## 2020-03-17 RX ORDER — HYDROMORPHONE HYDROCHLORIDE 1 MG/ML
0.5 INJECTION, SOLUTION INTRAMUSCULAR; INTRAVENOUS; SUBCUTANEOUS
Status: DISCONTINUED | OUTPATIENT
Start: 2020-03-17 | End: 2020-03-23

## 2020-03-17 RX ADMIN — OXYCODONE HYDROCHLORIDE 5 MG: 5 SOLUTION ORAL at 10:03

## 2020-03-17 RX ADMIN — Medication 800 MG: at 04:03

## 2020-03-17 RX ADMIN — LEVOTHYROXINE SODIUM 50 MCG: 25 TABLET ORAL at 06:03

## 2020-03-17 RX ADMIN — HYDROMORPHONE HYDROCHLORIDE 0.5 MG: 1 INJECTION, SOLUTION INTRAMUSCULAR; INTRAVENOUS; SUBCUTANEOUS at 02:03

## 2020-03-17 RX ADMIN — ACETAMINOPHEN 1000 MG: 500 TABLET ORAL at 02:03

## 2020-03-17 RX ADMIN — LEVALBUTEROL HYDROCHLORIDE 0.63 MG: 0.63 SOLUTION RESPIRATORY (INHALATION) at 08:03

## 2020-03-17 RX ADMIN — DEXTROSE MONOHYDRATE, SODIUM CHLORIDE, AND POTASSIUM CHLORIDE: 50; 4.5; 1.49 INJECTION, SOLUTION INTRAVENOUS at 12:03

## 2020-03-17 RX ADMIN — FENTANYL 1 PATCH: 12 PATCH TRANSDERMAL at 05:03

## 2020-03-17 RX ADMIN — HYDROMORPHONE HYDROCHLORIDE 0.5 MG: 1 INJECTION, SOLUTION INTRAMUSCULAR; INTRAVENOUS; SUBCUTANEOUS at 08:03

## 2020-03-17 RX ADMIN — LEVALBUTEROL HYDROCHLORIDE 0.63 MG: 0.63 SOLUTION RESPIRATORY (INHALATION) at 07:03

## 2020-03-17 RX ADMIN — LEVALBUTEROL HYDROCHLORIDE 0.63 MG: 0.63 SOLUTION RESPIRATORY (INHALATION) at 01:03

## 2020-03-17 RX ADMIN — PANTOPRAZOLE SODIUM 40 MG: 40 TABLET, DELAYED RELEASE ORAL at 04:03

## 2020-03-17 RX ADMIN — ACETAMINOPHEN 1000 MG: 500 TABLET ORAL at 06:03

## 2020-03-17 RX ADMIN — ENOXAPARIN SODIUM 40 MG: 100 INJECTION SUBCUTANEOUS at 04:03

## 2020-03-17 RX ADMIN — ONDANSETRON 4 MG: 2 INJECTION INTRAMUSCULAR; INTRAVENOUS at 09:03

## 2020-03-17 RX ADMIN — POTASSIUM CHLORIDE 10 MEQ: 750 CAPSULE, EXTENDED RELEASE ORAL at 04:03

## 2020-03-17 RX ADMIN — OXYCODONE HYDROCHLORIDE 5 MG: 5 SOLUTION ORAL at 03:03

## 2020-03-17 RX ADMIN — OXYCODONE HYDROCHLORIDE 10 MG: 5 SOLUTION ORAL at 04:03

## 2020-03-17 RX ADMIN — OXYCODONE HYDROCHLORIDE 10 MG: 5 SOLUTION ORAL at 09:03

## 2020-03-17 RX ADMIN — OXYCODONE HYDROCHLORIDE 5 MG: 5 SOLUTION ORAL at 07:03

## 2020-03-17 RX ADMIN — ACETAMINOPHEN 1000 MG: 500 TABLET ORAL at 09:03

## 2020-03-17 NOTE — PROGRESS NOTES
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  Pt has been having abdominal pain since Saturday.  Transferred after CT findings.  Reportedly had been responding to neoadjuvant chemo based on last PET    Post-Op Info:  Procedure(s) (LRB):  LAPAROTOMY, EXPLORATORY (N/A)  CLOSURE, ULCER, PERFORATED, DUODENUM, USING OMENTAL PATCH  BIOPSY, LIVER  INSERTION, PEG TUBE   7 Days Post-Op     Interval History:   No acute events overnight. Afebrile. VSS. Tolerating clear liquids. Limited appetite. Passing flatus. No BM. Ambulating some. Adequate UOP. Pain much better controlled relative to yesterday afternoon.      Medications:  Continuous Infusions:   dextrose 5 % and 0.45 % NaCl with KCl 20 mEq 50 mL/hr at 03/16/20 1742     Scheduled Meds:   acetaminophen  1,000 mg Oral Q8H    enoxaparin  40 mg Subcutaneous Daily    levalbuterol  0.63 mg Nebulization Q6H WAKE    levothyroxine  50 mcg Oral Before breakfast    pantoprazole  40 mg Oral BID AC     PRN Meds:Dextrose 10% Bolus, Dextrose 10% Bolus, diphenhydrAMINE, glucagon (human recombinant), influenza, insulin aspart U-100, ondansetron, oxyCODONE, oxyCODONE, sodium chloride 0.9%     Review of patient's allergies indicates:   Allergen Reactions    Azithromycin Other (See Comments)     Stomach pain     Codeine Hallucinations     Objective:     Vital Signs (Most Recent):  Temp: 98 °F (36.7 °C) (03/17/20 0740)  Pulse: 77 (03/17/20 0740)  Resp: 12 (03/17/20 0740)  BP: 124/75 (03/17/20 0740)  SpO2: 99 % (03/17/20 0740) Vital Signs (24h Range):  Temp:  [96.2 °F (35.7 °C)-98.4 °F (36.9 °C)] 98 °F (36.7 °C)  Pulse:  [75-85] 77  Resp:  [12-22] 12  SpO2:  [98 %-99 %] 99 %  BP: (118-132)/(66-75) 124/75     Weight: 47 kg  (103 lb 9.9 oz)  Body mass index is 18.95 kg/m².    Intake/Output - Last 3 Shifts       03/15 0700 - 03/16 0659 03/16 0700 - 03/17 0659 03/17 0700 - 03/18 0659    P.O. 240 240     I.V. (mL/kg) 1394 (29.7) 1126.7 (24)     Total Intake(mL/kg) 1634 (34.8) 1366.7 (29.1)     Urine (mL/kg/hr) 2950 (2.6) 275 (0.2) 375 (1.3)    Emesis/NG output  0     Drains 225 0     Other  0     Stool  0     Total Output 3175 275 375    Net -1541 +1091.7 -375           Urine Occurrence 5 x 5 x     Stool Occurrence  0 x     Emesis Occurrence  0 x           Physical Exam    Significant Labs:  CBC:   Recent Labs   Lab 03/17/20  0348   WBC 5.42   RBC 2.62*   HGB 8.1*   HCT 25.7*      MCV 98   MCH 30.9   MCHC 31.5*     CMP:   Recent Labs   Lab 03/17/20  0348      CALCIUM 8.5*   ALBUMIN 2.0*   PROT 5.6*   *   K 3.9   CO2 27      BUN 2*   CREATININE 0.7   ALKPHOS 688*   ALT 24   AST 30   BILITOT 1.3*       Significant Diagnostics:  None    Assessment/Plan:     * Pneumoperitoneum  73 y/o female with pancreatic cancer with plans to undergo Whipple with right nephrectomy and caval reconstruction with Dr. Wall. Transferred in with pneumoperitoneum. S/p exlap with repair of duodenal perforation and peritoneal biopsies on 3/10. UGI on 3/14 demonstrating no evidence of leak.     - Continue CLD  - Boost Breeze for nutritional supplementation  - Clamp G-tube  - Anti-emetics PRN  - Unclamp for nausea or vomiting not controlled by anti-emetics  - PT/OT  - Encourage OOB, ambulation in halls  - Pulmonary hygiene - IS, ACAPELLA, deep breathing  - Mechanical and chemical SDVT prophylaxis  - Replace electrolytes PRN      Dejuan Foy MD  Surgery Resident, PGY-V  Pager: [phone]  3/17/2020 1:01 PM

## 2020-03-17 NOTE — PHYSICIAN QUERY
PT Name: Julee Hawkins  MR #: [id number]    Physician Query Form - Cause and Effect Relationship Clarification    CDS: Melani KOCH,RN        Contact information:[phone]    This form is a permanent document in the medical record.     Query Date: March 17, 2020    By submitting this query, we are merely seeking further clarification of documentation. Please utilize your independent clinical judgment when addressing the question(s) below.    The Medical record contains the following:  Supporting Clinical Findings   Location in record    FINDINGS: Small perforation at level of duodenal stent, no other obvious ulcers or     perforation.                                                                   We intubated the duodenum and examined the first and second portions; there was a small area at the level of the duodenal stent with some surrounding inflammation that was the level of the perforation.                                                                                                            3/10/20 General Surgery Op Note       3/10/20: Approx 2 mm perforation the lateral aspect of the proximal duodenum overlying the indwelling duodenal stent.                                                                                                                                                                                   3/11/20 Critical Care Surgery Progress Notes         Provider, please clarify if there is any correlation between _duodenal perforation_______________________ and _duodenal stent_________________.           Are the conditions:      [  ] Due to or associated with each other   xx[  ] Unrelated to each other, related to tumor   [  ] Other (Please Specify): _________________________   [  ] Clinically Undetermined

## 2020-03-17 NOTE — PLAN OF CARE
POC reviewed with pt, verbalized understanding. AAOx4. VSS on RA. Up to BSC. Tolerating clears, decreased appetite. Pt up to chair for 2 hrs. C/o pain not being managed. R nephrostomy with adequate UOP. Up to BSC. G-tube clamped, denies nausea. IVF maintained. No adverse events. WCTM.

## 2020-03-17 NOTE — PT/OT/SLP PROGRESS
"Occupational Therapy   Treatment    Name: Julee Hawkins  MRN: [id number]  Admitting Diagnosis:  Pneumoperitoneum  7 Days Post-Op    Recommendations:     Discharge Recommendations: nursing facility, skilled  Discharge Equipment Recommendations:  bedside commode, walker, rolling, bath bench  Barriers to discharge:  (increased assistance needed)    Assessment:     Julee Hawkins is a 74 y.o. female with a medical diagnosis of Pneumoperitoneum.  She presents seated EOB with 7/10 abdominal pain. Session focused on bed mobility and therapeutic listening. Pt given 2 hot packs and RN notified of pt's pain level at conclusion of treatment. Performance deficits affecting function are weakness, impaired endurance, impaired self care skills, impaired functional mobilty, gait instability, impaired balance, impaired cardiopulmonary response to activity.     Rehab Prognosis:  Good; patient would benefit from acute skilled OT services to address these deficits and reach maximum level of function.       Plan:     Patient to be seen 3 x/week to address the above listed problems via self-care/home management, therapeutic activities, therapeutic exercises  · Plan of Care Expires: 04/11/20  · Plan of Care Reviewed with: patient    Subjective   "I barely slept last night"    Pain/Comfort:  · Pain Rating 1: 7/10  · Location - Orientation 1: generalized  · Location 1: abdomen  · Pain Addressed 1: Nurse notified, Distraction, Reposition  · Pain Rating Post-Intervention 1: 7/10    Objective:     Communicated with: RN prior to session.  Patient found seated EOB with telemetry, peripheral IV, PICC line upon OT entry to room.    General Precautions: Standard, fall   Orthopedic Precautions:N/A   Braces: N/A     Occupational Performance:     Bed Mobility:    · Patient completed Sit to Supine with minimum assistance BLE management  · Patient completed 3 scoots EOB left towards HOB with SBA    [facility] 6 Click ADL: 15    Treatment & " Education:  -Therapist provided facilitation and instruction of proper body mechanics, energy conservation, and fall prevention strategies during tasks listed above.  -Pt educated on role of OT, POC and goals for therapy  -Time provided for therapeutic listening and hot packs provided for pain, RN notified of pt's pain level  -Pt educated on importance of OOB activities with staff member assistance and sitting OOB majority of the day.   -Pt verbalized understanding. Pt expressed no further concerns/questions  -Whiteboard updated      Patient left HOB elevated with all lines intact, call button in reach and RN notifiedEducation:      GOALS:   Multidisciplinary Problems     Occupational Therapy Goals        Problem: Occupational Therapy Goal    Goal Priority Disciplines Outcome Interventions   Occupational Therapy Goal     OT, PT/OT Ongoing, Progressing    Description:  Goals to be met by: 3/18/2020     Patient will increase functional independence with ADLs by performing:    UE Dressing with Stand-by Assistance.  LE Dressing with Stand-by Assistance.  Grooming while standing at sink with Stand-by Assistance.  Toileting from toilet with Stand-by Assistance for hygiene and clothing management.   Upper extremity exercise program x10 reps per handout, with supervision.  Pt will perform functional mobility task of household and community distance with SBA using AD as needed.                    Time Tracking:     OT Date of Treatment: 03/17/20  OT Start Time: 1413  OT Stop Time: 1421  OT Total Time (min): 8 min    Billable Minutes:Therapeutic Activity 8    Bess Frost OT  3/17/2020

## 2020-03-17 NOTE — PLAN OF CARE
03/17/20 1142   Post-Acute Status   Post-Acute Authorization Placement   Post-Acute Placement Status Pending Post-Acute Clinical Review   KENYA spoke with Zoey from [facility] ([phone]). She stated pt's face sheet did not come over fax and had a few questions regarding pt's plan of care post acute. RAMOS was able to answer the questions and KENYA refaxed the face sheet. She reported they are having their morning meeting and will call us back.    UPDATE: 11:44 AM  KENYA left message for Zoey following up regarding the above.    Jaimee Negro LMSW  [facility]  [phone]

## 2020-03-17 NOTE — PLAN OF CARE
Plan of care reviewed, patient verbalized understanding. VSS, AAOx4. PRN Oxycodone elixir was given for pain. Abdominal ML with staples is DELVIS and is clean, dry and intact. RLQ Nephrostomy tube is intact with a small amount clear yellow urine. LUQ PEG tube is intact. Tolerating a clear liquid diet. Voiding on the bedside commode. Free from falls/injuries. No acute distress noted. Will continue to monitor.

## 2020-03-17 NOTE — SUBJECTIVE & OBJECTIVE
Interval History:   No acute events overnight. Afebrile. VSS. Tolerating clear liquids. Limited appetite. Passing flatus. No BM. Ambulating some. Adequate UOP. Pain much better controlled relative to yesterday afternoon.      Medications:  Continuous Infusions:   dextrose 5 % and 0.45 % NaCl with KCl 20 mEq 50 mL/hr at 03/16/20 1742     Scheduled Meds:   acetaminophen  1,000 mg Oral Q8H    enoxaparin  40 mg Subcutaneous Daily    levalbuterol  0.63 mg Nebulization Q6H WAKE    levothyroxine  50 mcg Oral Before breakfast    pantoprazole  40 mg Oral BID AC     PRN Meds:Dextrose 10% Bolus, Dextrose 10% Bolus, diphenhydrAMINE, glucagon (human recombinant), influenza, insulin aspart U-100, ondansetron, oxyCODONE, oxyCODONE, sodium chloride 0.9%     Review of patient's allergies indicates:   Allergen Reactions    Azithromycin Other (See Comments)     Stomach pain     Codeine Hallucinations     Objective:     Vital Signs (Most Recent):  Temp: 98 °F (36.7 °C) (03/17/20 0740)  Pulse: 77 (03/17/20 0740)  Resp: 12 (03/17/20 0740)  BP: 124/75 (03/17/20 0740)  SpO2: 99 % (03/17/20 0740) Vital Signs (24h Range):  Temp:  [96.2 °F (35.7 °C)-98.4 °F (36.9 °C)] 98 °F (36.7 °C)  Pulse:  [75-85] 77  Resp:  [12-22] 12  SpO2:  [98 %-99 %] 99 %  BP: (118-132)/(66-75) 124/75     Weight: 47 kg (103 lb 9.9 oz)  Body mass index is 18.95 kg/m².    Intake/Output - Last 3 Shifts       03/15 0700 - 03/16 0659 03/16 0700 - 03/17 0659 03/17 0700 - 03/18 0659    P.O. 240 240     I.V. (mL/kg) 1394 (29.7) 1126.7 (24)     Total Intake(mL/kg) 1634 (34.8) 1366.7 (29.1)     Urine (mL/kg/hr) 2950 (2.6) 275 (0.2) 375 (1.3)    Emesis/NG output  0     Drains 225 0     Other  0     Stool  0     Total Output 3175 275 375    Net -1541 +1091.7 -375           Urine Occurrence 5 x 5 x     Stool Occurrence  0 x     Emesis Occurrence  0 x           Physical Exam    Significant Labs:  CBC:   Recent Labs   Lab 03/17/20  0348   WBC 5.42   RBC 2.62*   HGB 8.1*    HCT 25.7*      MCV 98   MCH 30.9   MCHC 31.5*     CMP:   Recent Labs   Lab 03/17/20  0348      CALCIUM 8.5*   ALBUMIN 2.0*   PROT 5.6*   *   K 3.9   CO2 27      BUN 2*   CREATININE 0.7   ALKPHOS 688*   ALT 24   AST 30   BILITOT 1.3*       Significant Diagnostics:  None

## 2020-03-17 NOTE — PLAN OF CARE
Problem: Occupational Therapy Goal  Goal: Occupational Therapy Goal  Description  Goals to be met by: 3/18/2020     Patient will increase functional independence with ADLs by performing:    UE Dressing with Stand-by Assistance.  LE Dressing with Stand-by Assistance.  Grooming while standing at sink with Stand-by Assistance.  Toileting from toilet with Stand-by Assistance for hygiene and clothing management.   Upper extremity exercise program x10 reps per handout, with supervision.  Pt will perform functional mobility task of household and community distance with SBA using AD as needed.   Outcome: Ongoing, Progressing     Goals reviewed and remain appropriate, continue POC  Bess Frost OTR/L  3/17/2020   Pager #: [phone]

## 2020-03-17 NOTE — ASSESSMENT & PLAN NOTE
75 y/o female with pancreatic cancer with plans to undergo Whipple with right nephrectomy and caval reconstruction with Dr. Wall. Transferred in with pneumoperitoneum. S/p exlap with repair of duodenal perforation and peritoneal biopsies on 3/10. UGI on 3/14 demonstrating no evidence of leak.     - Continue CLD  - Boost Breeze for nutritional supplementation  - Clamp G-tube  - Anti-emetics PRN  - Unclamp for nausea or vomiting not controlled by anti-emetics  - PT/OT  - Encourage OOB, ambulation in halls  - Pulmonary hygiene - IS, ACAPELLA, deep breathing  - Mechanical and chemical SDVT prophylaxis  - Replace electrolytes PRN

## 2020-03-17 NOTE — PLAN OF CARE
Goals remain appropriate.     Problem: Physical Therapy Goal  Goal: Physical Therapy Goal  Description  Goals to be met by: 3/25/20    Patient will increase functional independence with mobility by performin. Supine to sit with Stand-by Assistance -met  2. Sit to stand transfer with Supervision -not met  3. Gait  x 150 feet with Contact Guard Assistance using AD if needed.. -not met  4. Ascend/descend 4 stair with no Handrails Contact Guard Assistance . -not met      Outcome: Ongoing, Progressing

## 2020-03-17 NOTE — PT/OT/SLP PROGRESS
Physical Therapy Treatment    Patient Name:  Julee Hawkins   MRN:  [id number]    Recommendations:     Discharge Recommendations:  nursing facility, skilled   Discharge Equipment Recommendations: bedside commode, walker, rolling, bath bench   Barriers to discharge: decreased functional mobility    Assessment:     Julee Hawkins is a 74 y.o. female admitted with a medical diagnosis of Pneumoperitoneum.  She presents with the following impairments/functional limitations:  weakness, impaired endurance, impaired self care skills, impaired functional mobilty, gait instability, impaired balance, impaired cardiopulmonary response to activity. Pt tolerated session well with focus on bed mobility, transfers, and gait training. Pt reporting pain but agreeable to mobilizing with therapy this day. Pt remains limited by pain and decreased motivation to mobilize with increased frequency. Pt responds well to encouragement and improves gait distance this day. Pt will continue to benefit from therapy services to address impairments listed above.     Rehab Prognosis: Good; patient would benefit from acute skilled PT services to address these deficits and reach maximum level of function.    Recent Surgery: Procedure(s) (LRB):  LAPAROTOMY, EXPLORATORY (N/A)  CLOSURE, ULCER, PERFORATED, DUODENUM, USING OMENTAL PATCH  BIOPSY, LIVER  INSERTION, PEG TUBE 7 Days Post-Op    Plan:     During this hospitalization, patient to be seen 4 x/week to address the identified rehab impairments via gait training, therapeutic activities, therapeutic exercises and progress toward the following goals:    · Plan of Care Expires:  04/09/20    Subjective     Chief Complaint: pain  Pain/Comfort:  · Pain Rating 1: 7/10  · Location - Orientation 1: generalized  · Location 1: abdomen  · Pain Addressed 1: Reposition, Distraction, Nurse notified  · Pain Rating Post-Intervention 1: 8/10      Objective:     Communicated with NSG prior to session.  Patient  found supine with telemetry, peripheral IV, PICC line upon PTA entry to room. Rehab tech present and assists with management of lines/drains and for bedside chair follow during gait trial.    General Precautions: Standard, fall   Orthopedic Precautions:N/A   Braces: N/A     Functional Mobility:  · Bed Mobility:     · Scooting: stand by assistance  · Supine to Sit: stand by assistance  · Transfers:     · Sit to Stand:  contact guard assistance with rolling walker  · Bed to Chair: contact guard assistance with  rolling walker  using  Stand Pivot  · Gait: Pt ambulates 78 ft with RW and CGA. Pt with extremely slow jefe, FFP, downward gaze, and poor mgmt of RW. Pt responds well but requires frequent cues for posture, visual scanning, and use of RW. Heavy fatigue at end of trial with pt reporting need for seated rest.       AM-PAC 6 CLICK MOBILITY  Turning over in bed (including adjusting bedclothes, sheets and blankets)?: 3  Sitting down on and standing up from a chair with arms (e.g., wheelchair, bedside commode, etc.): 3  Moving from lying on back to sitting on the side of the bed?: 3  Moving to and from a bed to a chair (including a wheelchair)?: 3  Need to walk in hospital room?: 3  Climbing 3-5 steps with a railing?: 2  Basic Mobility Total Score: 17       Therapeutic Activities and Exercises:  Pt assisted with functional mobility as noted above.   Pt with increased time to mobilize d/t pace, requiring encouragement to participate.  Pt educated on importance of increased time UIC/OOB and ambulation 3 x daily with NSG and use of RW.   Pt encouraged to transfer between bed and chair with asssistance, as needed for pain relief.      Patient left up in chair with all lines intact, call button in reach and NSG notified..    GOALS:   Multidisciplinary Problems     Physical Therapy Goals        Problem: Physical Therapy Goal    Goal Priority Disciplines Outcome Goal Variances Interventions   Physical Therapy Goal      PT, PT/OT Ongoing, Progressing     Description:  Goals to be met by: 3/25/20    Patient will increase functional independence with mobility by performin. Supine to sit with Stand-by Assistance -met  2. Sit to stand transfer with Supervision -not met  3. Gait  x 150 feet with Contact Guard Assistance using AD if needed.. -not met  4. Ascend/descend 4 stair with no Handrails Contact Guard Assistance . -not met                       Time Tracking:     PT Received On: 20  PT Start Time: 902     PT Stop Time: 942  PT Total Time (min): 40 min     Billable Minutes: Gait Training 15 and Therapeutic Activity 25    Treatment Type: Treatment  PT/PTA: PTA     PTA Visit Number: 2     Stephan Calderon, ELBERT  2020

## 2020-03-18 LAB
ALBUMIN SERPL BCP-MCNC: 2.1 G/DL (ref 3.5–5.2)
ALP SERPL-CCNC: 685 U/L (ref 55–135)
ALT SERPL W/O P-5'-P-CCNC: 21 U/L (ref 10–44)
ANION GAP SERPL CALC-SCNC: 6 MMOL/L (ref 8–16)
AST SERPL-CCNC: 21 U/L (ref 10–40)
BASOPHILS # BLD AUTO: 0.03 K/UL (ref 0–0.2)
BASOPHILS NFR BLD: 0.5 % (ref 0–1.9)
BILIRUB SERPL-MCNC: 1.2 MG/DL (ref 0.1–1)
BUN SERPL-MCNC: 3 MG/DL (ref 8–23)
CALCIUM SERPL-MCNC: 8.8 MG/DL (ref 8.7–10.5)
CHLORIDE SERPL-SCNC: 99 MMOL/L (ref 95–110)
CO2 SERPL-SCNC: 26 MMOL/L (ref 23–29)
CREAT SERPL-MCNC: 0.7 MG/DL (ref 0.5–1.4)
DIFFERENTIAL METHOD: ABNORMAL
EOSINOPHIL # BLD AUTO: 0.2 K/UL (ref 0–0.5)
EOSINOPHIL NFR BLD: 2.4 % (ref 0–8)
ERYTHROCYTE [DISTWIDTH] IN BLOOD BY AUTOMATED COUNT: 16.1 % (ref 11.5–14.5)
EST. GFR  (AFRICAN AMERICAN): >60 ML/MIN/1.73 M^2
EST. GFR  (NON AFRICAN AMERICAN): >60 ML/MIN/1.73 M^2
GLUCOSE SERPL-MCNC: 96 MG/DL (ref 70–110)
HCT VFR BLD AUTO: 25.4 % (ref 37–48.5)
HGB BLD-MCNC: 8 G/DL (ref 12–16)
IMM GRANULOCYTES # BLD AUTO: 0.03 K/UL (ref 0–0.04)
IMM GRANULOCYTES NFR BLD AUTO: 0.5 % (ref 0–0.5)
LYMPHOCYTES # BLD AUTO: 0.6 K/UL (ref 1–4.8)
LYMPHOCYTES NFR BLD: 10.2 % (ref 18–48)
MAGNESIUM SERPL-MCNC: 1.4 MG/DL (ref 1.6–2.6)
MCH RBC QN AUTO: 30.4 PG (ref 27–31)
MCHC RBC AUTO-ENTMCNC: 31.5 G/DL (ref 32–36)
MCV RBC AUTO: 97 FL (ref 82–98)
MONOCYTES # BLD AUTO: 0.5 K/UL (ref 0.3–1)
MONOCYTES NFR BLD: 7.2 % (ref 4–15)
NEUTROPHILS # BLD AUTO: 5 K/UL (ref 1.8–7.7)
NEUTROPHILS NFR BLD: 79.2 % (ref 38–73)
NRBC BLD-RTO: 0 /100 WBC
PHOSPHATE SERPL-MCNC: 3.6 MG/DL (ref 2.7–4.5)
PLATELET # BLD AUTO: 308 K/UL (ref 150–350)
PMV BLD AUTO: 9.9 FL (ref 9.2–12.9)
POCT GLUCOSE: 104 MG/DL (ref 70–110)
POCT GLUCOSE: 106 MG/DL (ref 70–110)
POCT GLUCOSE: 106 MG/DL (ref 70–110)
POTASSIUM SERPL-SCNC: 4.2 MMOL/L (ref 3.5–5.1)
PROT SERPL-MCNC: 5.6 G/DL (ref 6–8.4)
RBC # BLD AUTO: 2.63 M/UL (ref 4–5.4)
SODIUM SERPL-SCNC: 131 MMOL/L (ref 136–145)
WBC # BLD AUTO: 6.27 K/UL (ref 3.9–12.7)

## 2020-03-18 PROCEDURE — 80053 COMPREHEN METABOLIC PANEL: CPT

## 2020-03-18 PROCEDURE — 25000003 PHARM REV CODE 250: Performed by: STUDENT IN AN ORGANIZED HEALTH CARE EDUCATION/TRAINING PROGRAM

## 2020-03-18 PROCEDURE — 94761 N-INVAS EAR/PLS OXIMETRY MLT: CPT

## 2020-03-18 PROCEDURE — 94640 AIRWAY INHALATION TREATMENT: CPT

## 2020-03-18 PROCEDURE — 63600175 PHARM REV CODE 636 W HCPCS: Performed by: STUDENT IN AN ORGANIZED HEALTH CARE EDUCATION/TRAINING PROGRAM

## 2020-03-18 PROCEDURE — 94664 DEMO&/EVAL PT USE INHALER: CPT

## 2020-03-18 PROCEDURE — 25000003 PHARM REV CODE 250: Performed by: NURSE PRACTITIONER

## 2020-03-18 PROCEDURE — 83735 ASSAY OF MAGNESIUM: CPT

## 2020-03-18 PROCEDURE — 25000242 PHARM REV CODE 250 ALT 637 W/ HCPCS: Performed by: NURSE PRACTITIONER

## 2020-03-18 PROCEDURE — 63600175 PHARM REV CODE 636 W HCPCS: Performed by: NURSE PRACTITIONER

## 2020-03-18 PROCEDURE — 20600001 HC STEP DOWN PRIVATE ROOM

## 2020-03-18 PROCEDURE — 99900035 HC TECH TIME PER 15 MIN (STAT)

## 2020-03-18 PROCEDURE — 85025 COMPLETE CBC W/AUTO DIFF WBC: CPT

## 2020-03-18 PROCEDURE — 63700000 PHARM REV CODE 250 ALT 637 W/O HCPCS: Performed by: STUDENT IN AN ORGANIZED HEALTH CARE EDUCATION/TRAINING PROGRAM

## 2020-03-18 PROCEDURE — 84100 ASSAY OF PHOSPHORUS: CPT

## 2020-03-18 PROCEDURE — 25000003 PHARM REV CODE 250: Performed by: GENERAL PRACTICE

## 2020-03-18 RX ORDER — OXYCODONE HYDROCHLORIDE 10 MG/1
10 TABLET ORAL EVERY 4 HOURS PRN
Status: DISCONTINUED | OUTPATIENT
Start: 2020-03-18 | End: 2020-03-26 | Stop reason: HOSPADM

## 2020-03-18 RX ORDER — LANOLIN ALCOHOL/MO/W.PET/CERES
800 CREAM (GRAM) TOPICAL DAILY
Status: DISCONTINUED | OUTPATIENT
Start: 2020-03-18 | End: 2020-03-22

## 2020-03-18 RX ORDER — OXYCODONE HYDROCHLORIDE 10 MG/1
10 TABLET ORAL EVERY 4 HOURS PRN
Qty: 20 TABLET | Refills: 0 | Status: SHIPPED | OUTPATIENT
Start: 2020-03-18 | End: 2020-03-26

## 2020-03-18 RX ORDER — FENTANYL 12.5 UG/1
1 PATCH TRANSDERMAL
Qty: 5 PATCH | Refills: 0 | Status: SHIPPED | OUTPATIENT
Start: 2020-03-20 | End: 2020-03-26

## 2020-03-18 RX ORDER — LEVALBUTEROL INHALATION SOLUTION 0.63 MG/3ML
0.63 SOLUTION RESPIRATORY (INHALATION) EVERY 6 HOURS PRN
Status: DISCONTINUED | OUTPATIENT
Start: 2020-03-18 | End: 2020-03-26 | Stop reason: HOSPADM

## 2020-03-18 RX ORDER — NALOXONE HCL 0.4 MG/ML
0.4 VIAL (ML) INJECTION
Status: DISCONTINUED | OUTPATIENT
Start: 2020-03-18 | End: 2020-03-26 | Stop reason: HOSPADM

## 2020-03-18 RX ORDER — OXYCODONE HYDROCHLORIDE 5 MG/1
5 TABLET ORAL EVERY 4 HOURS PRN
Status: DISCONTINUED | OUTPATIENT
Start: 2020-03-18 | End: 2020-03-26 | Stop reason: HOSPADM

## 2020-03-18 RX ORDER — MAGNESIUM SULFATE HEPTAHYDRATE 40 MG/ML
2 INJECTION, SOLUTION INTRAVENOUS
Status: COMPLETED | OUTPATIENT
Start: 2020-03-18 | End: 2020-03-18

## 2020-03-18 RX ADMIN — ACETAMINOPHEN 1000 MG: 500 TABLET ORAL at 02:03

## 2020-03-18 RX ADMIN — OXYCODONE HYDROCHLORIDE 10 MG: 10 TABLET ORAL at 02:03

## 2020-03-18 RX ADMIN — HYDROMORPHONE HYDROCHLORIDE 0.5 MG: 1 INJECTION, SOLUTION INTRAMUSCULAR; INTRAVENOUS; SUBCUTANEOUS at 04:03

## 2020-03-18 RX ADMIN — PANTOPRAZOLE SODIUM 40 MG: 40 TABLET, DELAYED RELEASE ORAL at 04:03

## 2020-03-18 RX ADMIN — LEVALBUTEROL HYDROCHLORIDE 0.63 MG: 0.63 SOLUTION RESPIRATORY (INHALATION) at 12:03

## 2020-03-18 RX ADMIN — HYDROMORPHONE HYDROCHLORIDE 0.5 MG: 1 INJECTION, SOLUTION INTRAMUSCULAR; INTRAVENOUS; SUBCUTANEOUS at 03:03

## 2020-03-18 RX ADMIN — OXYCODONE HYDROCHLORIDE 10 MG: 10 TABLET ORAL at 10:03

## 2020-03-18 RX ADMIN — ONDANSETRON 4 MG: 2 INJECTION INTRAMUSCULAR; INTRAVENOUS at 07:03

## 2020-03-18 RX ADMIN — HYDROMORPHONE HYDROCHLORIDE 0.5 MG: 1 INJECTION, SOLUTION INTRAMUSCULAR; INTRAVENOUS; SUBCUTANEOUS at 12:03

## 2020-03-18 RX ADMIN — OXYCODONE HYDROCHLORIDE 10 MG: 5 SOLUTION ORAL at 10:03

## 2020-03-18 RX ADMIN — Medication 800 MG: at 10:03

## 2020-03-18 RX ADMIN — MAGNESIUM SULFATE HEPTAHYDRATE 2 G: 40 INJECTION, SOLUTION INTRAVENOUS at 12:03

## 2020-03-18 RX ADMIN — LEVALBUTEROL HYDROCHLORIDE 0.63 MG: 0.63 SOLUTION RESPIRATORY (INHALATION) at 07:03

## 2020-03-18 RX ADMIN — OXYCODONE HYDROCHLORIDE 10 MG: 5 SOLUTION ORAL at 05:03

## 2020-03-18 RX ADMIN — HYDROMORPHONE HYDROCHLORIDE 0.5 MG: 1 INJECTION, SOLUTION INTRAMUSCULAR; INTRAVENOUS; SUBCUTANEOUS at 08:03

## 2020-03-18 RX ADMIN — ENOXAPARIN SODIUM 40 MG: 100 INJECTION SUBCUTANEOUS at 04:03

## 2020-03-18 RX ADMIN — OXYCODONE HYDROCHLORIDE 10 MG: 5 SOLUTION ORAL at 01:03

## 2020-03-18 RX ADMIN — PANTOPRAZOLE SODIUM 40 MG: 40 TABLET, DELAYED RELEASE ORAL at 05:03

## 2020-03-18 RX ADMIN — ACETAMINOPHEN 1000 MG: 500 TABLET ORAL at 10:03

## 2020-03-18 RX ADMIN — DEXTROSE MONOHYDRATE, SODIUM CHLORIDE, AND POTASSIUM CHLORIDE: 50; 4.5; 1.49 INJECTION, SOLUTION INTRAVENOUS at 12:03

## 2020-03-18 RX ADMIN — MAGNESIUM SULFATE HEPTAHYDRATE 2 G: 40 INJECTION, SOLUTION INTRAVENOUS at 02:03

## 2020-03-18 RX ADMIN — LEVOTHYROXINE SODIUM 50 MCG: 25 TABLET ORAL at 05:03

## 2020-03-18 NOTE — PLAN OF CARE
[facility]     Department of Hospital Medicine     [address] [place] [address]     [phone] after hours  [phone] fax       NURSING HOME ORDERS    Patient Name: Julee Hawkins  YOB: [birth year]    Admit to Nursing Home:       Skilled Bed                                                Diagnoses:  Active Hospital Problems    Diagnosis  POA    *Pneumoperitoneum [K66.8]  Yes    Peritonitis [K65.9]  Yes    Severe protein-calorie malnutrition [E43]  Yes      Resolved Hospital Problems   No resolved problems to display.       Patient is homebound due to:  Pneumoperitoneum    Allergies:  Review of patient's allergies indicates:   Allergen Reactions    Azithromycin Other (See Comments)     Stomach pain     Codeine Hallucinations       Vitals:      Every shift (Skilled Nursing patients)    Diet: Full liquid diet                   Supplement:  1 can every three times a day with meals                         Type:  House         Acitivities:   - Up in a chair each morning as tolerated  - Ambulate with assistance to bathroom  - Scheduled walks once each shift (every 8 hours)  - May ambulate with assistance   - Weight bearing: full weight bearing.     LABS:  Per facility protocol     CMP, CBC each month for 3 months     Nursing Precautions:      - Aspiration precautions:             - Assistance with meals            -  Upright 90 degrees befor during and after meals                     - Fall precautions per nursing home protocol     - Decubitus precautions:        -  for positioning   - Pressure reducing foam mattress   - Turn patient every two hours. Use wedge pillows to anchor patient    CONSULTS:      Physical Therapy to evaluate and treat     Occupational Therapy to evaluate and treat     Nutrition to evaluate and recommend diet      to evaluate for long term goals.    MISCELLANEOUS CARE:      1.  Gastrostomy tube to left abdomen. May open gastrostomy tube as needed for nausea or vomiting for 20- 30 minutes and then clamp gastrostomy tube. Please record output.  Care:  Clean site every 24 hours.    2. Right abdomen nephrostomy tube. Empty and record output q shift        Routine Skin for Bedridden Patients:  Apply moisture barrier cream to all    skin folds and wet areas in perineal area daily and after baths and                           all bowel movements.                                          Medications: Discontinue all previous medication orders, if any. See new list below.      Julee Hawkins   Home Medication Instructions ANASTACIA:[id number]    Printed on:03/18/20 8420   Medication Information                      acetaminophen (TYLENOL) 650 MG TbSR  Take 1,000 mg by mouth 3 (three) times daily.              aspirin (ECOTRIN) 81 MG EC tablet  Take 81 mg by mouth once daily.             b complex vitamins capsule  Take 1 capsule by mouth once daily.             buPROPion (WELLBUTRIN XL) 150 MG TB24 tablet  Take 150 mg by mouth once daily.             calcium carbonate 1250 MG capsule  Take 1,250 mg by mouth 2 (two) times daily with meals.             celecoxib (CELEBREX) 200 MG capsule  TK 1 C PO QAM             cholecalciferol, vitamin D3, (VITAMIN D3) 10 mcg/mL (400 unit/mL) Drop  Take by mouth.             cycloSPORINE (RESTASIS) 0.05 % ophthalmic emulsion  1 drop 2 (two) times daily.                        ergocalciferol (VITAMIN D2) 50,000 unit Cap  Take 8,000 Units by mouth every 7 days.              fentaNYL (DURAGESIC) 12 mcg/hr PT72  Place 1 patch onto the skin every 72 hours.             gabapentin (NEURONTIN) 300 MG capsule  Take 300 mg by mouth 3 (three) times daily.             levothyroxine (SYNTHROID) 50 MCG tablet  Take 50 mcg by mouth once daily.                        multivitamin/iron/folic acid (CENTRUM COMPLETE ORAL)  Take by mouth.             ondansetron (ZOFRAN-ODT) 4 MG  TbDL  DISSOLVE 1 TABLET UNDER TONGUE EVERY 6 HOURS AS NEEDED FOR NAUSEA AND VOMITING                        oxyCODONE (ROXICODONE) 10 mg Tab immediate release tablet  Take 1 tablet (10 mg total) by mouth every 4 (four) hours as needed.             pantoprazole (PROTONIX) 40 MG tablet  Take 40 mg by mouth once daily.                        sertraline (ZOLOFT) 25 MG tablet  Take 25 mg by mouth once daily.                                       _________________________________  Kate Gabriel, MINDY  03/18/2020

## 2020-03-18 NOTE — ASSESSMENT & PLAN NOTE
75 y/o female with pancreatic cancer with plans to undergo Whipple with right nephrectomy and caval reconstruction with Dr. Wall. Transferred in with pneumoperitoneum. S/p exlap with repair of duodenal perforation and peritoneal biopsies on 3/10. UGI on 3/14 demonstrating no evidence of leak.     - FLD  - Boost Breeze for nutritional supplementation  - Clamp G-tube  - Unclamp for nausea or vomiting not controlled by anti-emetics  - Anti-emetics PRN  - PT/OT  - Encourage OOB, ambulation in halls  - Pulmonary hygiene - IS, ACAPELLA, deep breathing  - Mechanical and chemical SDVT prophylaxis  - Replace electrolytes PRN

## 2020-03-18 NOTE — PLAN OF CARE
Patient is alert and oriented. Able to make needs known. PRN Oxycodone and Dilaudid given for pain control. No s/s of respiratory/cardiac distress noted. Abdominal midline incision is clean, dry, and staples are intact. Peg tube is patent and remains clamped. R subclavian port is patent and flushes well. House fluids run continuously at 50 ml/hr. Patient remains on a full liquid diet and is tolerating it well. Right nephrostomy tube is patent and is draining well. Blood sugar checks continue every 6 hours and remain WNLs. VS stable. No issues or concerns at this time. Continue to monitor.

## 2020-03-18 NOTE — PROGRESS NOTES
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  Pt has been having abdominal pain since Saturday.  Transferred after CT findings.  Reportedly had been responding to neoadjuvant chemo based on last PET    Post-Op Info:  Procedure(s) (LRB):  LAPAROTOMY, EXPLORATORY (N/A)  CLOSURE, ULCER, PERFORATED, DUODENUM, USING OMENTAL PATCH  BIOPSY, LIVER  INSERTION, PEG TUBE   8 Days Post-Op     Interval History: No acute events overnight  Continues to have difficulty with pain control  1 episode of nausea overnight requiring venting of g-tube with 150 mL out  Tolerating clear liquids    Medications:  Continuous Infusions:   dextrose 5 % and 0.45 % NaCl with KCl 20 mEq 50 mL/hr at 03/18/20 0029     Scheduled Meds:   acetaminophen  1,000 mg Oral Q8H    enoxaparin  40 mg Subcutaneous Daily    fentaNYL  1 patch Transdermal Q72H    levalbuterol  0.63 mg Nebulization Q6H WAKE    levothyroxine  50 mcg Oral Before breakfast    pantoprazole  40 mg Oral BID AC     PRN Meds:Dextrose 10% Bolus, Dextrose 10% Bolus, diphenhydrAMINE, glucagon (human recombinant), HYDROmorphone, influenza, insulin aspart U-100, naloxone, ondansetron, oxyCODONE, oxyCODONE, sodium chloride 0.9%     Review of patient's allergies indicates:   Allergen Reactions    Azithromycin Other (See Comments)     Stomach pain     Codeine Hallucinations     Objective:     Vital Signs (Most Recent):  Temp: 98.1 °F (36.7 °C) (03/18/20 0826)  Pulse: 87 (03/18/20 0826)  Resp: 16 (03/18/20 0826)  BP: 112/68 (03/18/20 0826)  SpO2: 99 % (03/18/20 0826) Vital Signs (24h Range):  Temp:  [96.9 °F (36.1 °C)-98.6 °F (37 °C)] 98.1 °F (36.7 °C)  Pulse:  [66-87] 87  Resp:  [14-16] 16  SpO2:  [96 %-100 %] 99 %  BP:  (108-126)/(62-69) 112/68     Weight: 47 kg (103 lb 9.9 oz)  Body mass index is 18.95 kg/m².    Intake/Output - Last 3 Shifts       03/16 0700 - 03/17 0659 03/17 0700 - 03/18 0659 03/18 0700 - 03/19 0659    P.O. 240 180 360    I.V. (mL/kg) 1126.7 (24) 1315 (28)     IV Piggyback  0     Total Intake(mL/kg) 1366.7 (29.1) 1495 (31.8) 360 (7.7)    Urine (mL/kg/hr) 275 (0.2) 2125 (1.9)     Emesis/NG output 0 0     Drains 0 300     Other 0      Stool 0 0     Total Output 275 2425     Net +1091.7 -930 +360           Urine Occurrence 5 x 5 x     Stool Occurrence 0 x 0 x     Emesis Occurrence 0 x 0 x           Physical Exam   Constitutional: She is oriented to person, place, and time.   Cardiovascular: Normal rate and regular rhythm.   Pulmonary/Chest: Effort normal. No respiratory distress.   Abdominal: Soft. She exhibits no distension.   Midline incision c/d/i  R nephrostomy tube   Neurological: She is alert and oriented to person, place, and time.   Psychiatric: She has a normal mood and affect.   Nursing note and vitals reviewed.      Significant Labs:  Reviewed    Significant Diagnostics:  Reviewed    Assessment/Plan:     * Pneumoperitoneum  75 y/o female with pancreatic cancer with plans to undergo Whipple with right nephrectomy and caval reconstruction with Dr. Wall. Transferred in with pneumoperitoneum. S/p exlap with repair of duodenal perforation and peritoneal biopsies on 3/10. UGI on 3/14 demonstrating no evidence of leak.     - FLD  - Boost Breeze for nutritional supplementation  - Clamp G-tube  - Unclamp for nausea or vomiting not controlled by anti-emetics  - Anti-emetics PRN  - PT/OT  - Encourage OOB, ambulation in halls  - Pulmonary hygiene - IS, ACAPELLA, deep breathing  - Mechanical and chemical SDVT prophylaxis  - Replace electrolytes PRN        Leland Parson MD  General Surgery  [facility]

## 2020-03-18 NOTE — PLAN OF CARE
03/18/20 1416   Post-Acute Status   Post-Acute Authorization Placement   Post-Acute Placement Status Awaiting Internal Medical Clearance   KENYA spoke with Zoey at [facility]. She stated she completed admissions paperwork with the . KENYA faxed over TB s/s, PPD and chest XRAY to [phone].    UPDATE:2:22 PM  KENYA left message for Zoey ([phone]) regarding admittance for tomorrow.     UPDATE:3:54 PM  KENYA called Zoey again. She stated that they are waiting on preauth from [employer] [place]. KENYA called [facility] to follow up on auth and was on hold for over 20 minutes without being able to speak to anyone. KENYA will continue to follow up.    Jaimee Negro LMSW  [facility]  [phone]

## 2020-03-18 NOTE — PT/OT/SLP PROGRESS
Occupational Therapy      Patient Name:  Julee Hawkins   MRN:  [id number]    Patient not seen today secondary to refusal 2* pain, despite max encouragement. Will follow-up as scheduled.    Bess Frost OT  3/18/2020

## 2020-03-18 NOTE — PLAN OF CARE
Plan of care reviewed with patient, who verbalized understanding. Pt is able to get out of bed to the bedside commode with assistance. Pain management remains difficult, with Pt requiring frequent administration of both IV and oral narcotic pain medications. Pt is not tolerating much of her clear diet, but nausea has been controlled with infrequent use of IV Zofran. Increased activity is needed. Pt should be encouraged to ambulate in the halls this morning after a restful sleep, and pain has been better controlled.

## 2020-03-18 NOTE — PT/OT/SLP PROGRESS
Physical Therapy      Patient Name:  Julee Hawkins   MRN:  [id number]      Pt not seen on this date d/t refusal.  Pt refused treatment session secondary to pain despite max encouragement.  Chart reviewed and pt remain appropriate for PT services at this time.  Will see pt as schedule allows.      Kuldeep Murrieta, PT,DPT  3/18/2020

## 2020-03-18 NOTE — SUBJECTIVE & OBJECTIVE
Interval History: No acute events overnight  Continues to have difficulty with pain control  1 episode of nausea overnight requiring venting of g-tube with 150 mL out  Tolerating clear liquids    Medications:  Continuous Infusions:   dextrose 5 % and 0.45 % NaCl with KCl 20 mEq 50 mL/hr at 03/18/20 0029     Scheduled Meds:   acetaminophen  1,000 mg Oral Q8H    enoxaparin  40 mg Subcutaneous Daily    fentaNYL  1 patch Transdermal Q72H    levalbuterol  0.63 mg Nebulization Q6H WAKE    levothyroxine  50 mcg Oral Before breakfast    pantoprazole  40 mg Oral BID AC     PRN Meds:Dextrose 10% Bolus, Dextrose 10% Bolus, diphenhydrAMINE, glucagon (human recombinant), HYDROmorphone, influenza, insulin aspart U-100, naloxone, ondansetron, oxyCODONE, oxyCODONE, sodium chloride 0.9%     Review of patient's allergies indicates:   Allergen Reactions    Azithromycin Other (See Comments)     Stomach pain     Codeine Hallucinations     Objective:     Vital Signs (Most Recent):  Temp: 98.1 °F (36.7 °C) (03/18/20 0826)  Pulse: 87 (03/18/20 0826)  Resp: 16 (03/18/20 0826)  BP: 112/68 (03/18/20 0826)  SpO2: 99 % (03/18/20 0826) Vital Signs (24h Range):  Temp:  [96.9 °F (36.1 °C)-98.6 °F (37 °C)] 98.1 °F (36.7 °C)  Pulse:  [66-87] 87  Resp:  [14-16] 16  SpO2:  [96 %-100 %] 99 %  BP: (108-126)/(62-69) 112/68     Weight: 47 kg (103 lb 9.9 oz)  Body mass index is 18.95 kg/m².    Intake/Output - Last 3 Shifts       03/16 0700 - 03/17 0659 03/17 0700 - 03/18 0659 03/18 0700 - 03/19 0659    P.O. 240 180 360    I.V. (mL/kg) 1126.7 (24) 1315 (28)     IV Piggyback  0     Total Intake(mL/kg) 1366.7 (29.1) 1495 (31.8) 360 (7.7)    Urine (mL/kg/hr) 275 (0.2) 2125 (1.9)     Emesis/NG output 0 0     Drains 0 300     Other 0      Stool 0 0     Total Output 275 2425     Net +1091.7 -930 +360           Urine Occurrence 5 x 5 x     Stool Occurrence 0 x 0 x     Emesis Occurrence 0 x 0 x           Physical Exam   Constitutional: She is oriented to  person, place, and time.   Cardiovascular: Normal rate and regular rhythm.   Pulmonary/Chest: Effort normal. No respiratory distress.   Abdominal: Soft. She exhibits no distension.   Midline incision c/d/i  R nephrostomy tube   Neurological: She is alert and oriented to person, place, and time.   Psychiatric: She has a normal mood and affect.   Nursing note and vitals reviewed.      Significant Labs:  Reviewed    Significant Diagnostics:  Reviewed

## 2020-03-19 LAB
ALBUMIN SERPL BCP-MCNC: 2.2 G/DL (ref 3.5–5.2)
ALP SERPL-CCNC: 798 U/L (ref 55–135)
ALT SERPL W/O P-5'-P-CCNC: 21 U/L (ref 10–44)
ANION GAP SERPL CALC-SCNC: 7 MMOL/L (ref 8–16)
AST SERPL-CCNC: 25 U/L (ref 10–40)
BASOPHILS # BLD AUTO: 0.03 K/UL (ref 0–0.2)
BASOPHILS NFR BLD: 0.2 % (ref 0–1.9)
BILIRUB SERPL-MCNC: 1.6 MG/DL (ref 0.1–1)
BUN SERPL-MCNC: 4 MG/DL (ref 8–23)
CALCIUM SERPL-MCNC: 8.6 MG/DL (ref 8.7–10.5)
CHLORIDE SERPL-SCNC: 96 MMOL/L (ref 95–110)
CO2 SERPL-SCNC: 26 MMOL/L (ref 23–29)
COMMENT: NORMAL
CREAT SERPL-MCNC: 0.7 MG/DL (ref 0.5–1.4)
DIFFERENTIAL METHOD: ABNORMAL
EOSINOPHIL # BLD AUTO: 0 K/UL (ref 0–0.5)
EOSINOPHIL NFR BLD: 0.2 % (ref 0–8)
ERYTHROCYTE [DISTWIDTH] IN BLOOD BY AUTOMATED COUNT: 16 % (ref 11.5–14.5)
EST. GFR  (AFRICAN AMERICAN): >60 ML/MIN/1.73 M^2
EST. GFR  (NON AFRICAN AMERICAN): >60 ML/MIN/1.73 M^2
FINAL PATHOLOGIC DIAGNOSIS: NORMAL
GLUCOSE SERPL-MCNC: 86 MG/DL (ref 70–110)
GROSS: NORMAL
HCT VFR BLD AUTO: 31 % (ref 37–48.5)
HGB BLD-MCNC: 10 G/DL (ref 12–16)
IMM GRANULOCYTES # BLD AUTO: 0.06 K/UL (ref 0–0.04)
IMM GRANULOCYTES NFR BLD AUTO: 0.4 % (ref 0–0.5)
LYMPHOCYTES # BLD AUTO: 0.3 K/UL (ref 1–4.8)
LYMPHOCYTES NFR BLD: 2.4 % (ref 18–48)
MAGNESIUM SERPL-MCNC: 2 MG/DL (ref 1.6–2.6)
MCH RBC QN AUTO: 31.1 PG (ref 27–31)
MCHC RBC AUTO-ENTMCNC: 32.3 G/DL (ref 32–36)
MCV RBC AUTO: 96 FL (ref 82–98)
MONOCYTES # BLD AUTO: 0.3 K/UL (ref 0.3–1)
MONOCYTES NFR BLD: 1.9 % (ref 4–15)
NEUTROPHILS # BLD AUTO: 13.3 K/UL (ref 1.8–7.7)
NEUTROPHILS NFR BLD: 94.9 % (ref 38–73)
NRBC BLD-RTO: 0 /100 WBC
PHOSPHATE SERPL-MCNC: 3.1 MG/DL (ref 2.7–4.5)
PLATELET # BLD AUTO: 330 K/UL (ref 150–350)
PMV BLD AUTO: 10 FL (ref 9.2–12.9)
POCT GLUCOSE: 100 MG/DL (ref 70–110)
POCT GLUCOSE: 85 MG/DL (ref 70–110)
POTASSIUM SERPL-SCNC: 4.1 MMOL/L (ref 3.5–5.1)
PROT SERPL-MCNC: 6 G/DL (ref 6–8.4)
RBC # BLD AUTO: 3.22 M/UL (ref 4–5.4)
SODIUM SERPL-SCNC: 129 MMOL/L (ref 136–145)
SUPPLEMENTAL DIAGNOSIS: NORMAL
WBC # BLD AUTO: 14.04 K/UL (ref 3.9–12.7)

## 2020-03-19 PROCEDURE — 25500020 PHARM REV CODE 255: Performed by: SURGERY

## 2020-03-19 PROCEDURE — 99900035 HC TECH TIME PER 15 MIN (STAT)

## 2020-03-19 PROCEDURE — 27000221 HC OXYGEN, UP TO 24 HOURS

## 2020-03-19 PROCEDURE — 87040 BLOOD CULTURE FOR BACTERIA: CPT | Mod: 59

## 2020-03-19 PROCEDURE — 25000003 PHARM REV CODE 250: Performed by: GENERAL PRACTICE

## 2020-03-19 PROCEDURE — 99223 PR INITIAL HOSPITAL CARE,LEVL III: ICD-10-PCS | Mod: ,,, | Performed by: INTERNAL MEDICINE

## 2020-03-19 PROCEDURE — U0002 COVID-19 LAB TEST NON-CDC: HCPCS

## 2020-03-19 PROCEDURE — 94761 N-INVAS EAR/PLS OXIMETRY MLT: CPT

## 2020-03-19 PROCEDURE — 63600175 PHARM REV CODE 636 W HCPCS: Performed by: GENERAL PRACTICE

## 2020-03-19 PROCEDURE — 36415 COLL VENOUS BLD VENIPUNCTURE: CPT

## 2020-03-19 PROCEDURE — 85025 COMPLETE CBC W/AUTO DIFF WBC: CPT

## 2020-03-19 PROCEDURE — 63600175 PHARM REV CODE 636 W HCPCS: Performed by: NURSE PRACTITIONER

## 2020-03-19 PROCEDURE — 25000003 PHARM REV CODE 250: Performed by: STUDENT IN AN ORGANIZED HEALTH CARE EDUCATION/TRAINING PROGRAM

## 2020-03-19 PROCEDURE — 25500020 PHARM REV CODE 255: Performed by: STUDENT IN AN ORGANIZED HEALTH CARE EDUCATION/TRAINING PROGRAM

## 2020-03-19 PROCEDURE — 83735 ASSAY OF MAGNESIUM: CPT

## 2020-03-19 PROCEDURE — 84100 ASSAY OF PHOSPHORUS: CPT

## 2020-03-19 PROCEDURE — 20600001 HC STEP DOWN PRIVATE ROOM

## 2020-03-19 PROCEDURE — 25000003 PHARM REV CODE 250: Performed by: NURSE PRACTITIONER

## 2020-03-19 PROCEDURE — 63600175 PHARM REV CODE 636 W HCPCS: Performed by: STUDENT IN AN ORGANIZED HEALTH CARE EDUCATION/TRAINING PROGRAM

## 2020-03-19 PROCEDURE — 94664 DEMO&/EVAL PT USE INHALER: CPT

## 2020-03-19 PROCEDURE — 99223 1ST HOSP IP/OBS HIGH 75: CPT | Mod: ,,, | Performed by: INTERNAL MEDICINE

## 2020-03-19 PROCEDURE — 27000646 HC AEROBIKA DEVICE

## 2020-03-19 PROCEDURE — 80053 COMPREHEN METABOLIC PANEL: CPT

## 2020-03-19 RX ORDER — ONDANSETRON 2 MG/ML
4 INJECTION INTRAMUSCULAR; INTRAVENOUS EVERY 8 HOURS PRN
Status: DISCONTINUED | OUTPATIENT
Start: 2020-03-19 | End: 2020-03-26 | Stop reason: HOSPADM

## 2020-03-19 RX ORDER — PROCHLORPERAZINE EDISYLATE 5 MG/ML
2.5 INJECTION INTRAMUSCULAR; INTRAVENOUS EVERY 12 HOURS PRN
Status: DISCONTINUED | OUTPATIENT
Start: 2020-03-19 | End: 2020-03-26 | Stop reason: HOSPADM

## 2020-03-19 RX ORDER — ONDANSETRON 2 MG/ML
4 INJECTION INTRAMUSCULAR; INTRAVENOUS ONCE
Status: COMPLETED | OUTPATIENT
Start: 2020-03-19 | End: 2020-03-19

## 2020-03-19 RX ADMIN — PANTOPRAZOLE SODIUM 40 MG: 40 TABLET, DELAYED RELEASE ORAL at 05:03

## 2020-03-19 RX ADMIN — ENOXAPARIN SODIUM 40 MG: 100 INJECTION SUBCUTANEOUS at 04:03

## 2020-03-19 RX ADMIN — LEVOTHYROXINE SODIUM 50 MCG: 25 TABLET ORAL at 05:03

## 2020-03-19 RX ADMIN — Medication 800 MG: at 08:03

## 2020-03-19 RX ADMIN — IOHEXOL 15 ML: 350 INJECTION, SOLUTION INTRAVENOUS at 12:03

## 2020-03-19 RX ADMIN — OXYCODONE HYDROCHLORIDE 10 MG: 10 TABLET ORAL at 09:03

## 2020-03-19 RX ADMIN — PIPERACILLIN AND TAZOBACTAM 4.5 G: 4; .5 INJECTION, POWDER, FOR SOLUTION INTRAVENOUS at 04:03

## 2020-03-19 RX ADMIN — IOHEXOL 15 ML: 350 INJECTION, SOLUTION INTRAVENOUS at 10:03

## 2020-03-19 RX ADMIN — PANTOPRAZOLE SODIUM 40 MG: 40 TABLET, DELAYED RELEASE ORAL at 04:03

## 2020-03-19 RX ADMIN — ONDANSETRON 4 MG: 2 INJECTION INTRAMUSCULAR; INTRAVENOUS at 10:03

## 2020-03-19 RX ADMIN — ACETAMINOPHEN 1000 MG: 500 TABLET ORAL at 05:03

## 2020-03-19 RX ADMIN — IOHEXOL 75 ML: 350 INJECTION, SOLUTION INTRAVENOUS at 04:03

## 2020-03-19 RX ADMIN — OXYCODONE HYDROCHLORIDE 10 MG: 10 TABLET ORAL at 08:03

## 2020-03-19 RX ADMIN — ONDANSETRON 4 MG: 2 INJECTION INTRAMUSCULAR; INTRAVENOUS at 02:03

## 2020-03-19 RX ADMIN — ACETAMINOPHEN 1000 MG: 500 TABLET ORAL at 09:03

## 2020-03-19 NOTE — PT/OT/SLP PROGRESS
Occupational Therapy      Patient Name:  Julee Hawkins   MRN:  [id number]    Patient not seen today secondary to patient being worked up for Covid-19. Therapy services will be held at this time until it is appropriate to resume services. Please see last visit note dated 3/17 for latest functional mobility and disposition. Will follow-up as appropriate.    Conchis Severino OT  3/19/2020

## 2020-03-19 NOTE — ASSESSMENT & PLAN NOTE
74F PMH pancreatic CA on neoadjuvant chemo w/ plans for whipple who presented on 3/10 w/ abd pain, found to have pneumoperitoneum and pneumobilia, now s/p ex-lap w/ discovery of duodenal perf, carcinomatosis. Empirically on pip-tazo and fluc from 3/10-13. Developed new fever to 102.6 on 3/18, increased infiltrate on CXR, increased WBC to 14 and new hypoxia requiring 3L (possibly due to aspiration event w/ nausea after G-tube was clamped). COVID testing sent. ID consulted for abx recommendations    Recommendations:   - sources at this time aspiration vs. Intra-abdominal process  - continue pip-tazo  - follow up CT results  - follow up blood cultures and sputum culture  - COVID testing pending

## 2020-03-19 NOTE — HPI
74F hx pancreatic CA on neoadjuvant chemo w/ plans for whipple, complicated by R obstructing hydronephrosis s/p nephrostomy tube, duodenal stent and biliary stent, who was admitted on 3/10 w/ abd pain. CT w/ pneumoperitoneum and pneumobilia, now s/p ex-lap on 3/10, found to have duodenal perf (repaired with clyde-type patch) and carcinomatosis. She was started on fluconazole and pip-tazo empirically. UGI on 3/14 with no leak. Pt also has G tube in place, which is now clamped. Overnight, developed new hypoxia now on 3L w/ concerns for possible aspiration. WBC increased to 14, CXR w/ worsening R sided infiltrate. CT C/A/P ordered, COVID testing sent for r/o. ID consulted for assistance w/ abx.

## 2020-03-19 NOTE — ASSESSMENT & PLAN NOTE
75 y/o female with pancreatic cancer with plans to undergo Whipple with right nephrectomy and caval reconstruction with Dr. Wall. Transferred in with pneumoperitoneum. S/p exlap with repair of duodenal perforation and peritoneal biopsies on 3/10. UGI on 3/14 demonstrating no evidence of leak.     - FLD  - Boost Breeze for nutritional supplementation  - Clamp G-tube  - Unclamp for nausea or vomiting not controlled by anti-emetics  - Anti-emetics PRN  - PT/OT  - Encourage OOB, ambulation in halls  - Pulmonary hygiene - IS, ACAPELLA, deep breathing  - Mechanical and chemical SDVT prophylaxis  - Replace electrolytes PRN  - CXR demonstrate RML and RLL opacity, WBC elevated to 14 (from 6.3 yesterday). Order CT C/A/P

## 2020-03-19 NOTE — PT/OT/SLP PROGRESS
Physical Therapy      Patient Name:  Julee Hawkins   MRN:  [id number]    Patient not seen today secondary to Other (Comment)(Pt being worked up for Covid-19. Therapy services to hold at this time until appropriate to resume services. Please see last note dated 3/17 for latest functional mobility and disposition. ). Will follow-up per POC as appropriate.     Stephan Calderon, PTA

## 2020-03-19 NOTE — CARE UPDATE
"RAPID RESPONSE NURSE PROACTIVE ROUNDING NOTE     Time of Visit: 05:30    Admit Date: 3/10/2020  LOS: 9  Code Status: Full Code   Date of Visit: 2020  : 1945  Age: 74 y.o.  Sex: female  Race: Unknown  Bed: [address] [place] [address] A:   MRN: [id number]  Was the patient discharged from an ICU this admission? yes   Was the patient discharged from a PACU within last 24 hours?  no  Did the patient receive conscious sedation/general anesthesia in last 24 hours?  no  Was the patient in the ED within the past 24 hours?  no  Was the patient started on NIPPV within the past 24 hours?  no  Attending Physician: Quang Wall MD  Primary Service: Networked reference to record PCT     ASSESSMENT     Notified by bedside RN via phone call.  Reason for alert: unresponsive    Diagnosis: Pneumoperitoneum    Abnormal Vital Signs: /74 (BP Location: Left arm, Patient Position: Lying)   Pulse 98   Temp (!) 102.6 °F (39.2 °C) (Oral)   Resp 17   Ht 5' 2" (1.575 m)   Wt 47 kg (103 lb 9.9 oz)   LMP  (LMP Unknown)   SpO2 (!) 90%   Breastfeeding? No   BMI 18.95 kg/m²      Clinical Issues: Neuro    Patient  has a past medical history of Arthritis, Biliary stricture, Cancer, Cholangitis, Dilated bile duct, Duodenal stenosis, Jaundice, Pancreas cyst, Pancreatic mass, Protein calorie malnutrition, Thyroid disease, Ureteral stricture, right, Vitamin D deficiency, and Weight loss, unintentional.      Called to bedside by primary RN secondary to tachycardia 110s, temp 102.6, decreased SpO2 requiring oxygen, and the patient reported to be "unresponsive". Reported that primary team had been notified but that no suggestions given to primary RN. Upon arrival to room the patient appeared uncomfortable and was repositioned after which oxygenation was improved to 93% on 4 LPM. The patient was AAOX4 with a GCS of 15. Chest Xray, blood cultures, and urinalysis w/ culture pending upon chart review. Rapid RN administered PO tylenol to " patient secondary to fever. Primary team updated by Rapid team.      INTERVENTIONS/ RECOMMENDATIONS     Continue current plan of care.     Discussed plan of care with Shonda MILLER.    PHYSICIAN ESCALATION     Yes/No  yes    Orders received and case discussed with Dr. Abbott.    Disposition: Remain in room 1022.    FOLLOW-UP     Call back the Rapid Response Nurse, Mao Meyer, PAUL & Nadia OCONNOR RN at [phone] for additional questions or concerns.

## 2020-03-19 NOTE — PLAN OF CARE
03/19/20 1039   Post-Acute Status   Post-Acute Authorization Placement   Post-Acute Placement Status Pending Payor Review   SW attempted to contact [facility] again and was on hold for over 15 minutes. KENYA escalated case to management. KENYA faxed over updates to [facility] [place].    Jaimee Negro LMSW  [facility]  [phone]

## 2020-03-19 NOTE — PROGRESS NOTES
"[facility]  Adult Nutrition  Progress Note    SUMMARY       Recommendations    Pt meets severe malnutrition criteria.     1. Continue full liquid as tolerated.    Recommend changing ONS to Boost Plus.     2. As medically able, ADAT to Low Fiber/Residue with texture per SLP.     3. If TF warranted, recommend Tf of Isosource 1.5 @ 45 mL/hr to provide 1620 kcal, 73 gm protein, 825 mL fluid.    4. RD following.     Goals: Meet % EEN, EPN by RD f/u date  Nutrition Goal Status: progressing towards goal  Communication of RD Recs: (POC)    Reason for Assessment    Reason For Assessment: RD follow-up  Diagnosis: other (see comments)(Pneumonperitoneum, pancreatic adencarcinoma)  Interdisciplinary Rounds: did not attend  General Information Comments: Remote access. S/p exlap with repair of duodenal perforation and peritoneal biopsies 3/10. Noted RR called this AM. Pt eating 50% of full liquids at this time. NFPE completed 3/15. Pt continues to meet severe malnutrition criteria at this time.  Nutrition Discharge Planning: unable to determine at this time    Nutrition Risk Screen    Nutrition Risk Screen: no indicators present    Nutrition/Diet History    Patient Reported Diet/Restrictions/Preferences: general  Spiritual, Cultural Beliefs, Denominational Practices, Values that Affect Care: no  Factors Affecting Nutritional Intake: altered gastrointestinal function(full liquid diet)    Anthropometrics    Temp: 99.7 °F (37.6 °C)  Height Method: Stated  Height: 5' 2" (157.5 cm)  Height (inches): 62 in  Weight Method: Bed Scale  Weight: 47 kg (103 lb 9.9 oz)  Weight (lb): 103.62 lb  Ideal Body Weight (IBW), Female: 110 lb  % Ideal Body Weight, Female (lb): 94.2 %  BMI (Calculated): 18.9  BMI Grade: 18.5-24.9 - normal  Usual Body Weight (UBW), k.8 kg  % Usual Body Weight: 82.92  % Weight Change From Usual Weight: -17.25 %     Lab/Procedures/Meds    Pertinent Labs Reviewed: reviewed  Pertinent Labs Comments: " Na 129, BUN 4, alk phos 798, T bili 1.6  Pertinent Medications Reviewed: reviewed  Pertinent Medications Comments: acetaminophen, levothyroxine, pantoprazole    Estimated/Assessed Needs    Weight Used For Calorie Calculations: 47 kg (103 lb 9.9 oz)  Energy Calorie Requirements (kcal): 0789-3251 kcal/d  Energy Need Method: Kcal/kg(30-35 kcal/kg)  Protein Requirements: 57-71 g/d (1.2-1.5 g/kg)  Weight Used For Protein Calculations: 47 kg (103 lb 9.9 oz)     Estimated Fluid Requirement Method: other (see comments)(Per MD or 1 mL/kcal)  RDA Method (mL): 1410    Nutrition Prescription Ordered    Current Diet Order: Full Liquid  Oral Nutrition Supplement: Boost Breeze    Evaluation of Received Nutrient/Fluid Intake    Comments: LBM 3/17  Tolerance: tolerating  % Intake of Estimated Energy Needs: 25 - 50 %  % Meal Intake: Other: 50%    Nutrition Risk    Level of Risk/Frequency of Follow-up: (f/u 1 x wk)     Assessment and Plan    Severe protein-calorie malnutrition    Nutrition Problem:  Severe Protein-Calorie Malnutrition  Malnutrition in the context of Chronic Illness/Injury    Related to (etiology):  Inability to consume sufficient energy    Signs and Symptoms (as evidenced by):  Energy Intake: <75% of estimated energy requirement for > 3 months  Body Fat Depletion: severe depletion of orbitals, triceps and thoracic and lumbar region   Muscle Mass Depletion: severe depletion of temples, clavicle region, scapular region and interosseous muscle   Weight Loss: 17% x 6 months     Interventions (treatment strategy):  Collaboration of nutrition care w/ other providers  [employer] - Boost Breeze    Nutrition Diagnosis Status:  Continues              Monitor and Evaluation    Food and Nutrient Intake: energy intake, food and beverage intake  Food and Nutrient Adminstration: diet order  Physical Activity and Function: nutrition-related ADLs and IADLs  Anthropometric Measurements: weight, weight change, body mass  index  Biochemical Data, Medical Tests and Procedures: lipid profile, inflammatory profile, glucose/endocrine profile, electrolyte and renal panel, gastrointestinal profile  Nutrition-Focused Physical Findings: overall appearance     Malnutrition Assessment             Weight Loss (Malnutrition): greater than 10% in 6 months  Energy Intake (Malnutrition): less than 75% for greater than or equal to 3 months  Subcutaneous Fat (Malnutrition): severe depletion  Muscle Mass (Malnutrition): severe depletion   Orbital Region (Subcutaneous Fat Loss): severe depletion  Upper Arm Region (Subcutaneous Fat Loss): severe depletion  Thoracic and Lumbar Region: severe depletion   Gnosticist Region (Muscle Loss): severe depletion  Clavicle Bone Region (Muscle Loss): severe depletion  Scapular Bone Region (Muscle Loss): severe depletion  Dorsal Hand (Muscle Loss): severe depletion  Anterior Thigh Region (Muscle Loss): other (see comments)(ZAIRE)  Posterior Calf Region (Muscle Loss): other (see comments)(ZAIRE)                 Nutrition Follow-Up    RD Follow-up?: Yes

## 2020-03-19 NOTE — NURSING
RN called to room d/t temp of 102.6 and O2 of 85 on RA. Surgery intern paged and rapid response team called to proactively round on patient. Pt placed on 4L NC with oxygenation improving to 93% and given PO tylenol for temp. Chest Xray, Blood culture x2, urinalysis ordered. WCTM.

## 2020-03-19 NOTE — PLAN OF CARE
Patient is alert and oriented. Able to make needs known. PRN Oxycodone given for pain control. No s/s of respiratory/cardiac distress noted. Patient remains on continuous oxygen set at 3 LPM via NC. Abdominal midline incision is clean, dry, and intact. Peg tube is patent and remains clamped. Right subclavian port is patent and flushes well. Patient remains on a full liquid diet. Right Nephrostomy tube is patent and is draining well. VS stable. No issues or concerns at this time. Continue to monitor.

## 2020-03-19 NOTE — PROGRESS NOTES
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  Pt has been having abdominal pain since Saturday.  Transferred after CT findings.  Reportedly had been responding to neoadjuvant chemo based on last PET    Post-Op Info:  Procedure(s) (LRB):  LAPAROTOMY, EXPLORATORY (N/A)  CLOSURE, ULCER, PERFORATED, DUODENUM, USING OMENTAL PATCH  BIOPSY, LIVER  INSERTION, PEG TUBE   9 Days Post-Op     Interval History: Tmax of 102.6 this AM with a desat event to 90%. She seems lethargic this AM and c/o abd pain. She also c/o nausea, with prn venting of her G tube. WBC up this AM and CXR completed.     Medications:  Continuous Infusions:    Scheduled Meds:   acetaminophen  1,000 mg Oral Q8H    enoxaparin  40 mg Subcutaneous Daily    fentaNYL  1 patch Transdermal Q72H    levothyroxine  50 mcg Oral Before breakfast    magnesium oxide  800 mg Oral Daily    pantoprazole  40 mg Oral BID AC     PRN Meds:Dextrose 10% Bolus, Dextrose 10% Bolus, diphenhydrAMINE, glucagon (human recombinant), HYDROmorphone, influenza, insulin aspart U-100, iohexol, levalbuterol, naloxone, ondansetron, oxyCODONE, oxyCODONE, sodium chloride 0.9%     Review of patient's allergies indicates:   Allergen Reactions    Azithromycin Other (See Comments)     Stomach pain     Codeine Hallucinations     Objective:     Vital Signs (Most Recent):  Temp: 99.7 °F (37.6 °C) (03/19/20 0624)  Pulse: 98 (03/19/20 0502)  Resp: 17 (03/19/20 0502)  BP: 120/74 (03/19/20 0502)  SpO2: (!) 90 % (03/19/20 0515) Vital Signs (24h Range):  Temp:  [97.8 °F (36.6 °C)-102.6 °F (39.2 °C)] 99.7 °F (37.6 °C)  Pulse:  [71-98] 98  Resp:  [16-18] 17  SpO2:  [90 %-100 %] 90 %  BP: (111-137)/(64-78) 120/74     Weight: 47 kg (103 lb  9.9 oz)  Body mass index is 18.95 kg/m².    Intake/Output - Last 3 Shifts       03/17 0700 - 03/18 0659 03/18 0700 - 03/19 0659 03/19 0700 - 03/20 0659    P.O. 180 1080     I.V. (mL/kg) 1315 (28) 708.3 (15.1)     IV Piggyback 0      Total Intake(mL/kg) 1495 (31.8) 1788.3 (38)     Urine (mL/kg/hr) 2125 (1.9) 2100 (1.9)     Emesis/NG output 0      Drains 300 150     Other       Stool 0 0     Total Output 2425 2250     Net -930 -461.7            Urine Occurrence 5 x 2 x     Stool Occurrence 0 x 0 x     Emesis Occurrence 0 x            Physical Exam   Constitutional: She is oriented to person, place, and time.   Lethargic   Cardiovascular: Normal rate and regular rhythm.   Pulmonary/Chest: Effort normal. No respiratory distress.   Abdominal: Soft. She exhibits no distension. There is tenderness (Mild epigastric).   Midline incision c/d/i  R nephrostomy tube   Neurological: She is alert and oriented to person, place, and time.   Psychiatric: She has a normal mood and affect.   Nursing note and vitals reviewed.      Significant Labs:  Reviewed    Significant Diagnostics:  Reviewed   CXR: New patchy right mid and lower lung zone opacities which may relate to infection, aspiration, or asymmetric edema.  Interval improved aeration of the left lung base.    Assessment/Plan:     * Pneumoperitoneum  73 y/o female with pancreatic cancer with plans to undergo Whipple with right nephrectomy and caval reconstruction with Dr. Wall. Transferred in with pneumoperitoneum. S/p exlap with repair of duodenal perforation and peritoneal biopsies on 3/10. UGI on 3/14 demonstrating no evidence of leak.     - FLD  - Boost Breeze for nutritional supplementation  - Clamp G-tube  - Unclamp for nausea or vomiting not controlled by anti-emetics  - Anti-emetics PRN  - PT/OT  - Encourage OOB, ambulation in halls  - Pulmonary hygiene - IS, ACAPELLA, deep breathing  - Mechanical and chemical SDVT prophylaxis  - Replace electrolytes PRN  - CXR  demonstrate RML and RLL opacity, WBC elevated to 14 (from 6.3 yesterday). Order CT C/A/P        Stefano Robledo MD  General Surgery  [facility]

## 2020-03-19 NOTE — PLAN OF CARE
Problem: Malnutrition  Goal: Improved Nutritional Intake  Outcome: Ongoing, Progressing     Problem: Oral Intake Inadequate  Goal: Improved Oral Intake  Outcome: Ongoing, Progressing         Recommendations    Pt meets severe malnutrition criteria.     1. Continue full liquid as tolerated.    Recommend changing ONS to Boost Plus.     2. As medically able, ADAT to Low Fiber/Residue with texture per SLP.     3. If TF warranted, recommend Tf of Isosource 1.5 @ 45 mL/hr to provide 1620 kcal, 73 gm protein, 825 mL fluid.    4. RD following.     Goals: Meet % EEN, EPN by RD f/u date  Nutrition Goal Status: progressing towards goal

## 2020-03-19 NOTE — SUBJECTIVE & OBJECTIVE
Interval History: Tmax of 102.6 this AM with a desat event to 90%. She seems lethargic this AM and c/o abd pain. She also c/o nausea, with prn venting of her G tube. WBC up this AM and CXR completed.     Medications:  Continuous Infusions:    Scheduled Meds:   acetaminophen  1,000 mg Oral Q8H    enoxaparin  40 mg Subcutaneous Daily    fentaNYL  1 patch Transdermal Q72H    levothyroxine  50 mcg Oral Before breakfast    magnesium oxide  800 mg Oral Daily    pantoprazole  40 mg Oral BID AC     PRN Meds:Dextrose 10% Bolus, Dextrose 10% Bolus, diphenhydrAMINE, glucagon (human recombinant), HYDROmorphone, influenza, insulin aspart U-100, iohexol, levalbuterol, naloxone, ondansetron, oxyCODONE, oxyCODONE, sodium chloride 0.9%     Review of patient's allergies indicates:   Allergen Reactions    Azithromycin Other (See Comments)     Stomach pain     Codeine Hallucinations     Objective:     Vital Signs (Most Recent):  Temp: 99.7 °F (37.6 °C) (03/19/20 0624)  Pulse: 98 (03/19/20 0502)  Resp: 17 (03/19/20 0502)  BP: 120/74 (03/19/20 0502)  SpO2: (!) 90 % (03/19/20 0515) Vital Signs (24h Range):  Temp:  [97.8 °F (36.6 °C)-102.6 °F (39.2 °C)] 99.7 °F (37.6 °C)  Pulse:  [71-98] 98  Resp:  [16-18] 17  SpO2:  [90 %-100 %] 90 %  BP: (111-137)/(64-78) 120/74     Weight: 47 kg (103 lb 9.9 oz)  Body mass index is 18.95 kg/m².    Intake/Output - Last 3 Shifts       03/17 0700 - 03/18 0659 03/18 0700 - 03/19 0659 03/19 0700 - 03/20 0659    P.O. 180 1080     I.V. (mL/kg) 1315 (28) 708.3 (15.1)     IV Piggyback 0      Total Intake(mL/kg) 1495 (31.8) 1788.3 (38)     Urine (mL/kg/hr) 2125 (1.9) 2100 (1.9)     Emesis/NG output 0      Drains 300 150     Other       Stool 0 0     Total Output 2425 2250     Net -930 -461.7            Urine Occurrence 5 x 2 x     Stool Occurrence 0 x 0 x     Emesis Occurrence 0 x            Physical Exam   Constitutional: She is oriented to person, place, and time.   Lethargic   Cardiovascular: Normal  rate and regular rhythm.   Pulmonary/Chest: Effort normal. No respiratory distress.   Abdominal: Soft. She exhibits no distension. There is tenderness (Mild epigastric).   Midline incision c/d/i  R nephrostomy tube   Neurological: She is alert and oriented to person, place, and time.   Psychiatric: She has a normal mood and affect.   Nursing note and vitals reviewed.      Significant Labs:  Reviewed    Significant Diagnostics:  Reviewed   CXR: New patchy right mid and lower lung zone opacities which may relate to infection, aspiration, or asymmetric edema.  Interval improved aeration of the left lung base.

## 2020-03-19 NOTE — PLAN OF CARE
POC reviewed with patient, understanding verbalized. Pt ANOx4, VS mostly stable until 5 am - pt Tmax @ 102.6, HR in the 110's and required 4L NC to maintain sats >90%; Rapid Response rounded on pt per RN request. ML incision w/ staples remains approximated and dry tonight. LLQ G tube unclamped x1 d/t c/o nausea, no active vomiting tonight. Nephrostomy tube continues putting out clear yellow urine. House fluids D/C. Pt up to commode w/ 1 assist. Q6 POCT maintained. Pain well controlled with PRN meds tonight. Pt able to make needs known and remains free of fall or injury as safety measures intact. No further questions or needs reported at this time. Will continue to monitor.

## 2020-03-19 NOTE — SUBJECTIVE & OBJECTIVE
Past Medical History:   Diagnosis Date    Arthritis     Biliary stricture     Cancer     Breast cancer    Cholangitis     Dilated bile duct     Duodenal stenosis     Jaundice     Pancreas cyst     Pancreatic mass     Protein calorie malnutrition     Thyroid disease     Ureteral stricture, right     Vitamin D deficiency     Weight loss, unintentional        Past Surgical History:   Procedure Laterality Date    ANTEGRADE NEPHROSTOGRAPHY Right 2/13/2020    Procedure: Nephrostogram - antegrade;  Surgeon: Moncho Rowell MD;  Location: [facility] OR 1ST FLR;  Service: Urology;  Laterality: Right;    APPENDECTOMY      BACK SURGERY      BREAST SURGERY      lumpectomy    CHOLECYSTECTOMY      CLOSURE OF PERFORATED ULCER OF DUODENUM USING OMENTAL PATCH  3/10/2020    Procedure: CLOSURE, ULCER, PERFORATED, DUODENUM, USING OMENTAL PATCH;  Surgeon: Quang Wall MD;  Location: [facility] OR 2ND FLR;  Service: General;;    CYSTOSCOPY W/ RETROGRADES Right 2/13/2020    Procedure: CYSTOSCOPY, WITH RETROGRADE PYELOGRAM;  Surgeon: Moncho Rowell MD;  Location: [facility] OR 1ST FLR;  Service: Urology;  Laterality: Right;  1hr    ENDOSCOPIC ULTRASOUND OF UPPER GASTROINTESTINAL TRACT N/A 6/13/2019    Procedure: ULTRASOUND, UPPER GI TRACT, ENDOSCOPIC;  Surgeon: Jhonathan Abdul MD;  Location: [facility] (2ND FLR);  Service: Endoscopy;  Laterality: N/A;    ERCP N/A 6/13/2019    Procedure: ERCP (ENDOSCOPIC RETROGRADE CHOLANGIOPANCREATOGRAPHY);  Surgeon: Jhonathan Abdul MD;  Location: [facility] ENDO (2ND FLR);  Service: Endoscopy;  Laterality: N/A;    ERCP N/A 2/14/2020    Procedure: ERCP (ENDOSCOPIC RETROGRADE CHOLANGIOPANCREATOGRAPHY);  Surgeon: Coleman Merritt MD;  Location: [facility] ENDO (2ND FLR);  Service: Endoscopy;  Laterality: N/A;    HYSTERECTOMY      LIVER BIOPSY  3/10/2020    Procedure: BIOPSY, LIVER;  Surgeon: Quang Wall MD;  Location: [facility] OR 2ND FLR;  Service: General;;    rectovaginal  fistula repair      before 2010, from BRCA chemotherapy    SBR Right 10/09/2019    with open repair of R femoral hernia     SPINE SURGERY      URETEROSCOPY Right 2/13/2020    Procedure: URETEROSCOPY;  Surgeon: Moncho Rowell MD;  Location: [facility] OR [address] [place] [address];  Service: Urology;  Laterality: Right;       Review of patient's allergies indicates:   Allergen Reactions    Azithromycin Other (See Comments)     Stomach pain     Codeine Hallucinations       Medications:  Medications Prior to Admission   Medication Sig    buPROPion (WELLBUTRIN XL) 150 MG TB24 tablet Take 150 mg by mouth once daily.    gabapentin (NEURONTIN) 300 MG capsule Take 300 mg by mouth 3 (three) times daily.    levothyroxine (SYNTHROID) 50 MCG tablet Take 50 mcg by mouth once daily.    ondansetron (ZOFRAN-ODT) 4 MG TbDL DISSOLVE 1 TABLET UNDER TONGUE EVERY 6 HOURS AS NEEDED FOR NAUSEA AND VOMITING    pantoprazole (PROTONIX) 40 MG tablet Take 40 mg by mouth once daily.    prochlorperazine (COMPAZINE) 10 MG tablet Take 10 mg by mouth every 6 (six) hours as needed.    [DISCONTINUED] methocarbamoL (ROBAXIN) 500 MG Tab Take 500 mg by mouth 4 (four) times daily.    [DISCONTINUED] OLANZapine (ZYPREXA) 5 MG tablet Take 5 mg by mouth every evening.    acetaminophen (TYLENOL) 650 MG TbSR Take 1,000 mg by mouth 3 (three) times daily.     aspirin (ECOTRIN) 81 MG EC tablet Take 81 mg by mouth once daily.    b complex vitamins capsule Take 1 capsule by mouth once daily.    calcium carbonate 1250 MG capsule Take 1,250 mg by mouth 2 (two) times daily with meals.    celecoxib (CELEBREX) 200 MG capsule TK 1 C PO QAM    cholecalciferol, vitamin D3, (VITAMIN D3) 10 mcg/mL (400 unit/mL) Drop Take by mouth.    cycloSPORINE (RESTASIS) 0.05 % ophthalmic emulsion 1 drop 2 (two) times daily.    denosumab (PROLIA) 60 mg/mL Syrg Inject 60 mg into the skin.    ergocalciferol (VITAMIN D2) 50,000 unit Cap Take 8,000 Units by mouth every 7 days.      lipase-protease-amylase 24,000-76,000-120,000 units (CREON) 24,000-76,000 -120,000 unit capsule Take 1 capsule by mouth 3 (three) times daily with meals.    multivitamin/iron/folic acid (CENTRUM COMPLETE ORAL) Take by mouth.    ondansetron (ZOFRAN-ODT) 8 MG TbDL Take 8 mg by mouth once.    sertraline (ZOLOFT) 25 MG tablet Take 25 mg by mouth once daily.    traMADol (ULTRAM) 50 mg tablet Take 50 mg by mouth every 6 (six) hours as needed for Pain.    TURMERIC ORAL Take by mouth.    [DISCONTINUED] denosumab (PROLIA) 60 mg/mL Syrg Inject into the skin.    [DISCONTINUED] HYDROcodone-acetaminophen (NORCO) 7.5-325 mg per tablet Take 1 tablet by mouth every 6 (six) hours as needed for Pain.    [DISCONTINUED] oxyCODONE-acetaminophen (PERCOCET)  mg per tablet Take 1 tablet by mouth every 6 (six) hours as needed.    [DISCONTINUED] pantoprazole (PROTONIX) 40 MG tablet Take by mouth.    [DISCONTINUED] tiZANidine (ZANAFLEX) 4 MG tablet Take 4 mg by mouth every 6 (six) hours as needed.     Antibiotics (From admission, onward)    None        Antifungals (From admission, onward)    None        Antivirals (From admission, onward)    None           Immunization History   Administered Date(s) Administered    PPD Test 03/12/2020       Family History     Problem Relation (Age of Onset)    Cancer Paternal Uncle, Brother    Diabetes Father (87)        Social History     Socioeconomic History    Marital status:      Spouse name: Not on file    Number of children: 1    Years of education: Not on file    Highest education level: Not on file   Occupational History    Occupation:    Social Needs    Financial resource strain: Not on file    Food insecurity:     Worry: Not on file     Inability: Not on file    Transportation needs:     Medical: Not on file     Non-medical: Not on file   Tobacco Use    Smoking status: Former Smoker     Last attempt to quit: 6/13/1969     Years since quitting:  50.8    Smokeless tobacco: Never Used   Substance and Sexual Activity    Alcohol use: Yes     Alcohol/week: 2.0 standard drinks     Types: 2 Glasses of wine per week     Comment: 2 glasses of red wine/ night . Not currently     Drug use: Never    Sexual activity: Not on file   Lifestyle    Physical activity:     Days per week: Not on file     Minutes per session: Not on file    Stress: Not on file   Relationships    Social connections:     Talks on phone: Not on file     Gets together: Not on file     Attends Zoroastrianism service: Not on file     Active member of club or organization: Not on file     Attends meetings of clubs or organizations: Not on file     Relationship status: Not on file   Other Topics Concern    Not on file   Social History Narrative    Not on file     Review of Systems per attestation  Objective:     Vital Signs (Most Recent):  Temp: 97.9 °F (36.6 °C) (03/19/20 0858)  Pulse: 84 (03/19/20 0858)  Resp: 16 (03/19/20 0858)  BP: (!) 90/53 (03/19/20 0858)  SpO2: 100 % (03/19/20 0858) Vital Signs (24h Range):  Temp:  [97.9 °F (36.6 °C)-102.6 °F (39.2 °C)] 97.9 °F (36.6 °C)  Pulse:  [72-98] 84  Resp:  [16-18] 16  SpO2:  [90 %-100 %] 100 %  BP: ()/(53-78) 90/53     Weight: 47 kg (103 lb 9.9 oz)  Body mass index is 18.95 kg/m².    Estimated Creatinine Clearance: 51.5 mL/min (based on SCr of 0.7 mg/dL).    Physical Exam per attestation    Significant Labs:   CBC:   Recent Labs   Lab 03/18/20  0253 03/19/20  0500   WBC 6.27 14.04*   HGB 8.0* 10.0*   HCT 25.4* 31.0*    330     CMP:   Recent Labs   Lab 03/18/20  0253 03/19/20  0500   * 129*   K 4.2 4.1   CL 99 96   CO2 26 26   GLU 96 86   BUN 3* 4*   CREATININE 0.7 0.7   CALCIUM 8.8 8.6*   PROT 5.6* 6.0   ALBUMIN 2.1* 2.2*   BILITOT 1.2* 1.6*   ALKPHOS 685* 798*   AST 21 25   ALT 21 21   ANIONGAP 6* 7*   EGFRNONAA >60.0 >60.0     Microbiology Results (last 7 days)     Procedure Component Value Units Date/Time    Blood culture  [[id number]] Collected:  03/19/20 0554    Order Status:  Completed Specimen:  Blood Updated:  03/19/20 1515     Blood Culture, Routine No Growth to date    Blood culture [[id number]] Collected:  03/19/20 0554    Order Status:  Completed Specimen:  Blood Updated:  03/19/20 1515     Blood Culture, Routine No Growth to date    Respiratory Infection Panel (PCR), Nasopharyngeal [[id number]]     Order Status:  No result Specimen:  Nasopharyngeal Swab     Culture, Respiratory with Gram Stain [[id number]]     Order Status:  No result Specimen:  Respiratory     Blood Culture #2 **CANNOT BE ORDERED STAT** [[id number]] Collected:  03/10/20 0340    Order Status:  Completed Specimen:  Blood from Peripheral, Hand, Right Updated:  03/15/20 0822     Blood Culture, Routine No growth after 5 days.    Blood Culture #1 **CANNOT BE ORDERED STAT** [[id number]] Collected:  03/10/20 0340    Order Status:  Completed Specimen:  Blood from Peripheral, Wrist, Left Updated:  03/15/20 0822     Blood Culture, Routine No growth after 5 days.          Significant Imaging: I have reviewed all pertinent imaging results/findings within the past 24 hours.

## 2020-03-19 NOTE — PROGRESS NOTES
[facility]  Infectious Disease  Progress Note    Patient Name: Julee Hawkins  MRN: [id number]  Admission Date: 3/10/2020  Length of Stay: 9 days  Attending Physician: Quang Wall MD  Primary Care Provider: Shalom Cerna MD    Isolation Status: Airborne and Contact and Droplet  Assessment/Plan:      Peritonitis  74F PMH pancreatic CA on neoadjuvant chemo w/ plans for whipple who presented on 3/10 w/ abd pain, found to have pneumoperitoneum and pneumobilia, now s/p ex-lap w/ discovery of duodenal perf, carcinomatosis. Empirically on pip-tazo and fluc from 3/10-13. Developed new fever to 102.6 on 3/18, increased infiltrate on CXR, increased WBC to 14 and new hypoxia requiring 3L (possibly due to aspiration event w/ nausea after G-tube was clamped). COVID testing sent. ID consulted for abx recommendations    Recommendations:   - sources at this time aspiration vs. Intra-abdominal process  - continue pip-tazo  - follow up CT results  - follow up blood cultures and sputum culture  - COVID testing pending        Anticipated Disposition: TBD    Thank you for your consult. I will follow-up with patient. Please contact us if you have any additional questions.    Mary Jo Dillon, DO  Infectious Disease  [facility]    Subjective:     Principal Problem:Pneumoperitoneum    HPI: 74F hx pancreatic CA on neoadjuvant chemo w/ plans for whipple, complicated by R obstructing hydronephrosis s/p nephrostomy tube, duodenal stent and biliary stent, who was admitted on 3/10 w/ abd pain. CT w/ pneumoperitoneum and pneumobilia, now s/p ex-lap on 3/10, found to have duodenal perf (repaired with clyde-type patch) and carcinomatosis. She was started on fluconazole and pip-tazo empirically. UGI on 3/14 with no leak. Pt also has G tube in place, which is now clamped. Overnight, developed new hypoxia now on 3L w/ concerns for possible aspiration. WBC increased to 14, CXR w/ worsening R sided  infiltrate. CT C/A/P ordered, COVID testing sent for r/o. ID consulted for assistance w/ abx.   Past Medical History:   Diagnosis Date    Arthritis     Biliary stricture     Cancer     Breast cancer    Cholangitis     Dilated bile duct     Duodenal stenosis     Jaundice     Pancreas cyst     Pancreatic mass     Protein calorie malnutrition     Thyroid disease     Ureteral stricture, right     Vitamin D deficiency     Weight loss, unintentional        Past Surgical History:   Procedure Laterality Date    ANTEGRADE NEPHROSTOGRAPHY Right 2/13/2020    Procedure: Nephrostogram - antegrade;  Surgeon: Moncho Rowell MD;  Location: [facility] OR 1ST FLR;  Service: Urology;  Laterality: Right;    APPENDECTOMY      BACK SURGERY      BREAST SURGERY      lumpectomy    CHOLECYSTECTOMY      CLOSURE OF PERFORATED ULCER OF DUODENUM USING OMENTAL PATCH  3/10/2020    Procedure: CLOSURE, ULCER, PERFORATED, DUODENUM, USING OMENTAL PATCH;  Surgeon: Quang Wall MD;  Location: [facility] OR 2ND FLR;  Service: General;;    CYSTOSCOPY W/ RETROGRADES Right 2/13/2020    Procedure: CYSTOSCOPY, WITH RETROGRADE PYELOGRAM;  Surgeon: Moncho Rowell MD;  Location: [facility] OR [address] [place] [address];  Service: Urology;  Laterality: Right;  1hr    ENDOSCOPIC ULTRASOUND OF UPPER GASTROINTESTINAL TRACT N/A 6/13/2019    Procedure: ULTRASOUND, UPPER GI TRACT, ENDOSCOPIC;  Surgeon: Jhonathan bAdul MD;  [facility]  Service: Endoscopy;  Laterality: N/A;    ERCP N/A 6/13/2019    Procedure: ERCP (ENDOSCOPIC RETROGRADE CHOLANGIOPANCREATOGRAPHY);  Surgeon: Jhonathan Abdul MD;  Location: [facility] ENDO (2ND FLR);  Service: Endoscopy;  Laterality: N/A;    ERCP N/A 2/14/2020    Procedure: ERCP (ENDOSCOPIC RETROGRADE CHOLANGIOPANCREATOGRAPHY);  Surgeon: Coleman Merritt MD;  Location: [facility] ENDO (2ND FLR);  Service: Endoscopy;  Laterality: N/A;    HYSTERECTOMY      LIVER BIOPSY  3/10/2020    Procedure: BIOPSY, LIVER;  Surgeon:  Quang Wall MD;  Location: [facility] OR 2ND FLR;  Service: General;;    rectovaginal fistula repair      before 2010, from BRCA chemotherapy    SBR Right 10/09/2019    with open repair of R femoral hernia     SPINE SURGERY      URETEROSCOPY Right 2/13/2020    Procedure: URETEROSCOPY;  Surgeon: Moncho Rowell MD;  Location: [facility] OR 1ST FLR;  Service: Urology;  Laterality: Right;       Review of patient's allergies indicates:   Allergen Reactions    Azithromycin Other (See Comments)     Stomach pain     Codeine Hallucinations       Medications:  Medications Prior to Admission   Medication Sig    buPROPion (WELLBUTRIN XL) 150 MG TB24 tablet Take 150 mg by mouth once daily.    gabapentin (NEURONTIN) 300 MG capsule Take 300 mg by mouth 3 (three) times daily.    levothyroxine (SYNTHROID) 50 MCG tablet Take 50 mcg by mouth once daily.    ondansetron (ZOFRAN-ODT) 4 MG TbDL DISSOLVE 1 TABLET UNDER TONGUE EVERY 6 HOURS AS NEEDED FOR NAUSEA AND VOMITING    pantoprazole (PROTONIX) 40 MG tablet Take 40 mg by mouth once daily.    prochlorperazine (COMPAZINE) 10 MG tablet Take 10 mg by mouth every 6 (six) hours as needed.    [DISCONTINUED] methocarbamoL (ROBAXIN) 500 MG Tab Take 500 mg by mouth 4 (four) times daily.    [DISCONTINUED] OLANZapine (ZYPREXA) 5 MG tablet Take 5 mg by mouth every evening.    acetaminophen (TYLENOL) 650 MG TbSR Take 1,000 mg by mouth 3 (three) times daily.     aspirin (ECOTRIN) 81 MG EC tablet Take 81 mg by mouth once daily.    b complex vitamins capsule Take 1 capsule by mouth once daily.    calcium carbonate 1250 MG capsule Take 1,250 mg by mouth 2 (two) times daily with meals.    celecoxib (CELEBREX) 200 MG capsule TK 1 C PO QAM    cholecalciferol, vitamin D3, (VITAMIN D3) 10 mcg/mL (400 unit/mL) Drop Take by mouth.    cycloSPORINE (RESTASIS) 0.05 % ophthalmic emulsion 1 drop 2 (two) times daily.    denosumab (PROLIA) 60 mg/mL Syrg Inject 60 mg into the skin.     ergocalciferol (VITAMIN D2) 50,000 unit Cap Take 8,000 Units by mouth every 7 days.     lipase-protease-amylase 24,000-76,000-120,000 units (CREON) 24,000-76,000 -120,000 unit capsule Take 1 capsule by mouth 3 (three) times daily with meals.    multivitamin/iron/folic acid (CENTRUM COMPLETE ORAL) Take by mouth.    ondansetron (ZOFRAN-ODT) 8 MG TbDL Take 8 mg by mouth once.    sertraline (ZOLOFT) 25 MG tablet Take 25 mg by mouth once daily.    traMADol (ULTRAM) 50 mg tablet Take 50 mg by mouth every 6 (six) hours as needed for Pain.    TURMERIC ORAL Take by mouth.    [DISCONTINUED] denosumab (PROLIA) 60 mg/mL Syrg Inject into the skin.    [DISCONTINUED] HYDROcodone-acetaminophen (NORCO) 7.5-325 mg per tablet Take 1 tablet by mouth every 6 (six) hours as needed for Pain.    [DISCONTINUED] oxyCODONE-acetaminophen (PERCOCET)  mg per tablet Take 1 tablet by mouth every 6 (six) hours as needed.    [DISCONTINUED] pantoprazole (PROTONIX) 40 MG tablet Take by mouth.    [DISCONTINUED] tiZANidine (ZANAFLEX) 4 MG tablet Take 4 mg by mouth every 6 (six) hours as needed.     Antibiotics (From admission, onward)    None        Antifungals (From admission, onward)    None        Antivirals (From admission, onward)    None           Immunization History   Administered Date(s) Administered    PPD Test 03/12/2020       Family History     Problem Relation (Age of Onset)    Cancer Paternal Uncle, Brother    Diabetes Father (87)        Social History     Socioeconomic History    Marital status:      Spouse name: Not on file    Number of children: 1    Years of education: Not on file    Highest education level: Not on file   Occupational History    Occupation:    Social Needs    Financial resource strain: Not on file    Food insecurity:     Worry: Not on file     Inability: Not on file    Transportation needs:     Medical: Not on file     Non-medical: Not on file   Tobacco Use     Smoking status: Former Smoker     Last attempt to quit: 1969     Years since quittin.8    Smokeless tobacco: Never Used   Substance and Sexual Activity    Alcohol use: Yes     Alcohol/week: 2.0 standard drinks     Types: 2 Glasses of wine per week     Comment: 2 glasses of red wine/ night . Not currently     Drug use: Never    Sexual activity: Not on file   Lifestyle    Physical activity:     Days per week: Not on file     Minutes per session: Not on file    Stress: Not on file   Relationships    Social connections:     Talks on phone: Not on file     Gets together: Not on file     Attends Sabianist service: Not on file     Active member of club or organization: Not on file     Attends meetings of clubs or organizations: Not on file     Relationship status: Not on file   Other Topics Concern    Not on file   Social History Narrative    Not on file     Review of Systems per attestation  Objective:     Vital Signs (Most Recent):  Temp: 97.9 °F (36.6 °C) (20)  Pulse: 84 (20)  Resp: 16 (20)  BP: (!) 90/53 (20)  SpO2: 100 % (20) Vital Signs (24h Range):  Temp:  [97.9 °F (36.6 °C)-102.6 °F (39.2 °C)] 97.9 °F (36.6 °C)  Pulse:  [72-98] 84  Resp:  [16-18] 16  SpO2:  [90 %-100 %] 100 %  BP: ()/(53-78) 90/53     Weight: 47 kg (103 lb 9.9 oz)  Body mass index is 18.95 kg/m².    Estimated Creatinine Clearance: 51.5 mL/min (based on SCr of 0.7 mg/dL).    Physical Exam per attestation    Significant Labs:   CBC:   Recent Labs   Lab 20  0253 20  0500   WBC 6.27 14.04*   HGB 8.0* 10.0*   HCT 25.4* 31.0*    330     CMP:   Recent Labs   Lab 20  0253 20  0500   * 129*   K 4.2 4.1   CL 99 96   CO2 26 26   GLU 96 86   BUN 3* 4*   CREATININE 0.7 0.7   CALCIUM 8.8 8.6*   PROT 5.6* 6.0   ALBUMIN 2.1* 2.2*   BILITOT 1.2* 1.6*   ALKPHOS 685* 798*   AST 21 25   ALT 21 21   ANIONGAP 6* 7*   EGFRNONAA >60.0 >60.0     Microbiology  Results (last 7 days)     Procedure Component Value Units Date/Time    Blood culture [[id number]] Collected:  03/19/20 0554    Order Status:  Completed Specimen:  Blood Updated:  03/19/20 1515     Blood Culture, Routine No Growth to date    Blood culture [[id number]] Collected:  03/19/20 0554    Order Status:  Completed Specimen:  Blood Updated:  03/19/20 1515     Blood Culture, Routine No Growth to date    Respiratory Infection Panel (PCR), Nasopharyngeal [[id number]]     Order Status:  No result Specimen:  Nasopharyngeal Swab     Culture, Respiratory with Gram Stain [[id number]]     Order Status:  No result Specimen:  Respiratory     Blood Culture #2 **CANNOT BE ORDERED STAT** [[id number]] Collected:  03/10/20 0340    Order Status:  Completed Specimen:  Blood from Peripheral, Hand, Right Updated:  03/15/20 0822     Blood Culture, Routine No growth after 5 days.    Blood Culture #1 **CANNOT BE ORDERED STAT** [[id number]] Collected:  03/10/20 0340    Order Status:  Completed Specimen:  Blood from Peripheral, Wrist, Left Updated:  03/15/20 0822     Blood Culture, Routine No growth after 5 days.          Significant Imaging: I have reviewed all pertinent imaging results/findings within the past 24 hours.

## 2020-03-20 LAB
ALBUMIN SERPL BCP-MCNC: 1.9 G/DL (ref 3.5–5.2)
ALP SERPL-CCNC: 574 U/L (ref 55–135)
ALT SERPL W/O P-5'-P-CCNC: 18 U/L (ref 10–44)
ANION GAP SERPL CALC-SCNC: 7 MMOL/L (ref 8–16)
AST SERPL-CCNC: 20 U/L (ref 10–40)
BASOPHILS # BLD AUTO: 0.02 K/UL (ref 0–0.2)
BASOPHILS NFR BLD: 0.1 % (ref 0–1.9)
BILIRUB SERPL-MCNC: 1.2 MG/DL (ref 0.1–1)
BUN SERPL-MCNC: 11 MG/DL (ref 8–23)
CALCIUM SERPL-MCNC: 8.1 MG/DL (ref 8.7–10.5)
CHLORIDE SERPL-SCNC: 94 MMOL/L (ref 95–110)
CO2 SERPL-SCNC: 26 MMOL/L (ref 23–29)
CREAT SERPL-MCNC: 0.8 MG/DL (ref 0.5–1.4)
DIFFERENTIAL METHOD: ABNORMAL
EOSINOPHIL # BLD AUTO: 0 K/UL (ref 0–0.5)
EOSINOPHIL NFR BLD: 0.1 % (ref 0–8)
ERYTHROCYTE [DISTWIDTH] IN BLOOD BY AUTOMATED COUNT: 15.9 % (ref 11.5–14.5)
EST. GFR  (AFRICAN AMERICAN): >60 ML/MIN/1.73 M^2
EST. GFR  (NON AFRICAN AMERICAN): >60 ML/MIN/1.73 M^2
GLUCOSE SERPL-MCNC: 98 MG/DL (ref 70–110)
HCT VFR BLD AUTO: 22.4 % (ref 37–48.5)
HGB BLD-MCNC: 7.3 G/DL (ref 12–16)
IMM GRANULOCYTES # BLD AUTO: 0.06 K/UL (ref 0–0.04)
IMM GRANULOCYTES NFR BLD AUTO: 0.4 % (ref 0–0.5)
LYMPHOCYTES # BLD AUTO: 0.7 K/UL (ref 1–4.8)
LYMPHOCYTES NFR BLD: 5.1 % (ref 18–48)
MAGNESIUM SERPL-MCNC: 1.9 MG/DL (ref 1.6–2.6)
MCH RBC QN AUTO: 31.1 PG (ref 27–31)
MCHC RBC AUTO-ENTMCNC: 32.6 G/DL (ref 32–36)
MCV RBC AUTO: 95 FL (ref 82–98)
MONOCYTES # BLD AUTO: 0.6 K/UL (ref 0.3–1)
MONOCYTES NFR BLD: 4 % (ref 4–15)
NEUTROPHILS # BLD AUTO: 12.5 K/UL (ref 1.8–7.7)
NEUTROPHILS NFR BLD: 90.3 % (ref 38–73)
NRBC BLD-RTO: 0 /100 WBC
PHOSPHATE SERPL-MCNC: 3.1 MG/DL (ref 2.7–4.5)
PLATELET # BLD AUTO: 235 K/UL (ref 150–350)
PMV BLD AUTO: 9.7 FL (ref 9.2–12.9)
POTASSIUM SERPL-SCNC: 4.3 MMOL/L (ref 3.5–5.1)
PROT SERPL-MCNC: 5.2 G/DL (ref 6–8.4)
RBC # BLD AUTO: 2.35 M/UL (ref 4–5.4)
SODIUM SERPL-SCNC: 127 MMOL/L (ref 136–145)
WBC # BLD AUTO: 13.85 K/UL (ref 3.9–12.7)

## 2020-03-20 PROCEDURE — 25000003 PHARM REV CODE 250: Performed by: GENERAL PRACTICE

## 2020-03-20 PROCEDURE — 63600175 PHARM REV CODE 636 W HCPCS: Performed by: NURSE PRACTITIONER

## 2020-03-20 PROCEDURE — 63600175 PHARM REV CODE 636 W HCPCS: Performed by: STUDENT IN AN ORGANIZED HEALTH CARE EDUCATION/TRAINING PROGRAM

## 2020-03-20 PROCEDURE — 83735 ASSAY OF MAGNESIUM: CPT

## 2020-03-20 PROCEDURE — 63600175 PHARM REV CODE 636 W HCPCS: Performed by: INTERNAL MEDICINE

## 2020-03-20 PROCEDURE — 20600001 HC STEP DOWN PRIVATE ROOM

## 2020-03-20 PROCEDURE — 94761 N-INVAS EAR/PLS OXIMETRY MLT: CPT

## 2020-03-20 PROCEDURE — 99233 SBSQ HOSP IP/OBS HIGH 50: CPT | Mod: ,,, | Performed by: INTERNAL MEDICINE

## 2020-03-20 PROCEDURE — 84100 ASSAY OF PHOSPHORUS: CPT

## 2020-03-20 PROCEDURE — 99900035 HC TECH TIME PER 15 MIN (STAT)

## 2020-03-20 PROCEDURE — 80053 COMPREHEN METABOLIC PANEL: CPT

## 2020-03-20 PROCEDURE — 85025 COMPLETE CBC W/AUTO DIFF WBC: CPT

## 2020-03-20 PROCEDURE — 94664 DEMO&/EVAL PT USE INHALER: CPT

## 2020-03-20 PROCEDURE — 99233 PR SUBSEQUENT HOSPITAL CARE,LEVL III: ICD-10-PCS | Mod: ,,, | Performed by: INTERNAL MEDICINE

## 2020-03-20 PROCEDURE — 27000646 HC AEROBIKA DEVICE

## 2020-03-20 PROCEDURE — 63600175 PHARM REV CODE 636 W HCPCS: Performed by: GENERAL PRACTICE

## 2020-03-20 PROCEDURE — 25000003 PHARM REV CODE 250: Performed by: NURSE PRACTITIONER

## 2020-03-20 PROCEDURE — 27000221 HC OXYGEN, UP TO 24 HOURS

## 2020-03-20 PROCEDURE — 25000003 PHARM REV CODE 250: Performed by: STUDENT IN AN ORGANIZED HEALTH CARE EDUCATION/TRAINING PROGRAM

## 2020-03-20 RX ORDER — SODIUM CHLORIDE 9 MG/ML
INJECTION, SOLUTION INTRAVENOUS CONTINUOUS
Status: DISCONTINUED | OUTPATIENT
Start: 2020-03-20 | End: 2020-03-22 | Stop reason: ALTCHOICE

## 2020-03-20 RX ORDER — AMOXICILLIN AND CLAVULANATE POTASSIUM 400; 57 MG/5ML; MG/5ML
845 POWDER, FOR SUSPENSION ORAL EVERY 12 HOURS
Status: DISCONTINUED | OUTPATIENT
Start: 2020-03-20 | End: 2020-03-20

## 2020-03-20 RX ORDER — DEXTROSE MONOHYDRATE AND SODIUM CHLORIDE 5; .45 G/100ML; G/100ML
INJECTION, SOLUTION INTRAVENOUS CONTINUOUS
Status: DISCONTINUED | OUTPATIENT
Start: 2020-03-20 | End: 2020-03-20

## 2020-03-20 RX ADMIN — ENOXAPARIN SODIUM 40 MG: 100 INJECTION SUBCUTANEOUS at 05:03

## 2020-03-20 RX ADMIN — Medication 800 MG: at 07:03

## 2020-03-20 RX ADMIN — PANTOPRAZOLE SODIUM 40 MG: 40 TABLET, DELAYED RELEASE ORAL at 03:03

## 2020-03-20 RX ADMIN — OXYCODONE HYDROCHLORIDE 10 MG: 10 TABLET ORAL at 05:03

## 2020-03-20 RX ADMIN — ONDANSETRON 4 MG: 2 INJECTION INTRAMUSCULAR; INTRAVENOUS at 03:03

## 2020-03-20 RX ADMIN — HYDROMORPHONE HYDROCHLORIDE 0.5 MG: 1 INJECTION, SOLUTION INTRAMUSCULAR; INTRAVENOUS; SUBCUTANEOUS at 12:03

## 2020-03-20 RX ADMIN — LEVOTHYROXINE SODIUM 50 MCG: 25 TABLET ORAL at 05:03

## 2020-03-20 RX ADMIN — OXYCODONE HYDROCHLORIDE 10 MG: 10 TABLET ORAL at 11:03

## 2020-03-20 RX ADMIN — PANTOPRAZOLE SODIUM 40 MG: 40 TABLET, DELAYED RELEASE ORAL at 05:03

## 2020-03-20 RX ADMIN — SODIUM CHLORIDE: 0.9 INJECTION, SOLUTION INTRAVENOUS at 07:03

## 2020-03-20 RX ADMIN — AMPICILLIN SODIUM AND SULBACTAM SODIUM 3 G: 2; 1 INJECTION, POWDER, FOR SOLUTION INTRAMUSCULAR; INTRAVENOUS at 11:03

## 2020-03-20 RX ADMIN — PIPERACILLIN AND TAZOBACTAM 4.5 G: 4; .5 INJECTION, POWDER, FOR SOLUTION INTRAVENOUS at 12:03

## 2020-03-20 RX ADMIN — PIPERACILLIN AND TAZOBACTAM 4.5 G: 4; .5 INJECTION, POWDER, FOR SOLUTION INTRAVENOUS at 07:03

## 2020-03-20 RX ADMIN — PIPERACILLIN AND TAZOBACTAM 4.5 G: 4; .5 INJECTION, POWDER, FOR SOLUTION INTRAVENOUS at 03:03

## 2020-03-20 RX ADMIN — FENTANYL 1 PATCH: 12 PATCH TRANSDERMAL at 05:03

## 2020-03-20 RX ADMIN — AMPICILLIN SODIUM AND SULBACTAM SODIUM 3 G: 2; 1 INJECTION, POWDER, FOR SOLUTION INTRAMUSCULAR; INTRAVENOUS at 05:03

## 2020-03-20 RX ADMIN — ACETAMINOPHEN 1000 MG: 500 TABLET ORAL at 05:03

## 2020-03-20 NOTE — ASSESSMENT & PLAN NOTE
75 y/o female with pancreatic cancer with plans to undergo Whipple with right nephrectomy and caval reconstruction with Dr. Wall. Transferred in with pneumoperitoneum. S/p exlap with repair of duodenal perforation and peritoneal biopsies on 3/10. UGI on 3/14 demonstrating no evidence of leak.     - NPO, mIVF  - DC Boost  - Clamp G-tube  - Unclamp for nausea or vomiting not controlled by anti-emetics  - Anti-emetics PRN  - PT/OT  - Encourage OOB, ambulation in halls  - Pulmonary hygiene - IS, ACAPELLA, deep breathing  - Mechanical and chemical SDVT prophylaxis  - Replace electrolytes PRN  - Zosyn for suspected aspiration pneumonia, f/u COVID test

## 2020-03-20 NOTE — PLAN OF CARE
Plan of care reviewed with pt, who verbalized understanding. Pt remaining on precautions for R/O Covid-19. Pt NPO w/ meds. Pt states nausea is better today, no need to unclamp G-tube. Pt ambulated w/ RN assist to BR for AUO, nephrostomy tube in place. Pt w/ R flank pain around nephrostomy tube. NS @ 75 through R subclavian port. Call bell in reach, bed locked in lowest position. Frequent rounds made for pt safety. AAOx4, VSS, will continue to monitor.

## 2020-03-20 NOTE — PROGRESS NOTES
[facility]  Infectious Disease  Progress Note    Patient Name: Julee Hawkins  MRN: [id number]  Admission Date: 3/10/2020  Length of Stay: 10 days  Attending Physician: Quang Wall MD  Primary Care Provider: Shalom Cerna MD    Isolation Status: Airborne and Contact and Droplet  Assessment/Plan:      Peritonitis  74-year-old female with history of pancreatic CA on neoadjuvant chemo w/ plans for whipple who presented on 3/10/2020 with abdominal pain, found to have pneumoperitoneum and pneumobilia, now s/p ex-lap w/ discovery of duodenal perf, carcinomatosis. Patient was placed empirically on pip-tazo and fluc from 3/10-13.     Patient developed new fever to 102.6 on 3/18/2020 after aspiration event after G-tube was clamped.  On evaluation, noted to have increased oxygen requirement (2L), increased WBC to 14, new RLL infiltrate on CXR.     CT abd/pelvis with consolidation in RLL with airbronchograms consistent with aspiration pneumonia / pneumonitis, no evidence of intraabdominal infection.    Recommendations:   - Discontinue pip-tazo, start ampicillin-sulbactam for aspiration pneumonia  - On discharge, okay to transition to amox-clav 875-125 mg PO BID  - Total 7 day course of antibiotics, last day 3/25/2020  - Follow-up COVID-19 testing - continue airborne-contact-droplet precautions for now          Thank you for your consult. I will follow-up with patient. Please contact us if you have any additional questions.    Jenni Leggett MD  Infectious Disease  [facility]    Subjective:     Principal Problem:Pneumoperitoneum    HPI: 74F hx pancreatic CA on neoadjuvant chemo w/ plans for whipple, complicated by R obstructing hydronephrosis s/p nephrostomy tube, duodenal stent and biliary stent, who was admitted on 3/10 w/ abd pain. CT w/ pneumoperitoneum and pneumobilia, now s/p ex-lap on 3/10, found to have duodenal perf (repaired with clyde-type patch) and  carcinomatosis. She was started on fluconazole and pip-tazo empirically. UGI on 3/14 with no leak. Pt also has G tube in place, which is now clamped. Overnight, developed new hypoxia now on 3L w/ concerns for possible aspiration. WBC increased to 14, CXR w/ worsening R sided infiltrate. CT C/A/P ordered, COVID testing sent for r/o. ID consulted for assistance w/ abx.   Interval History:   Patient has been afebrile  Denied shortness of breath, off oxygen  Notes cough when she takes in a deep breath    Review of Systems   Constitutional: Negative for chills, diaphoresis and fever.   HENT: Negative for rhinorrhea and sore throat.    Respiratory: Negative for cough and shortness of breath.    Cardiovascular: Negative for chest pain and leg swelling.   Gastrointestinal: Negative for abdominal pain, diarrhea, nausea and vomiting.   Genitourinary: Negative for dysuria and hematuria.   Musculoskeletal: Negative for arthralgias and myalgias.   Skin: Negative for rash.   Neurological: Negative for headaches.     Objective:     Vital Signs (Most Recent):  Temp: 98.2 °F (36.8 °C) (03/20/20 1202)  Pulse: 76 (03/20/20 1202)  Resp: 16 (03/20/20 1202)  BP: 101/62 (03/20/20 1202)  SpO2: 98 % (03/20/20 1202) Vital Signs (24h Range):  Temp:  [96.1 °F (35.6 °C)-98.5 °F (36.9 °C)] 98.2 °F (36.8 °C)  Pulse:  [72-93] 76  Resp:  [15-20] 16  SpO2:  [96 %-100 %] 98 %  BP: ()/(50-62) 101/62     Weight: 47 kg (103 lb 9.9 oz)  Body mass index is 18.95 kg/m².    Estimated Creatinine Clearance: 45.1 mL/min (based on SCr of 0.8 mg/dL).    Physical Exam   Constitutional: She is oriented to person, place, and time. She appears well-developed and well-nourished. No distress.   HENT:   Head: Normocephalic and atraumatic.   Eyes: Conjunctivae and EOM are normal.   Neck: Normal range of motion. Neck supple.   Cardiovascular: Normal rate, regular rhythm and normal heart sounds.   Pulmonary/Chest: Effort normal and breath sounds normal. No  respiratory distress. She has no wheezes. She has no rales.   Abdominal: Soft. Bowel sounds are normal. She exhibits no distension and no mass. There is no guarding.   Musculoskeletal: Normal range of motion. She exhibits no edema.   Neurological: She is alert and oriented to person, place, and time.   Skin: Skin is warm and dry. No rash noted. She is not diaphoretic. No erythema.   Psychiatric: She has a normal mood and affect. Her behavior is normal.   Vitals reviewed.      Significant Labs: All pertinent labs within the past 24 hours have been reviewed.    Significant Imaging: I have reviewed all pertinent imaging results/findings within the past 24 hours.

## 2020-03-20 NOTE — PROGRESS NOTES
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  Pt has been having abdominal pain since Saturday.  Transferred after CT findings.  Reportedly had been responding to neoadjuvant chemo based on last PET    Post-Op Info:  Procedure(s) (LRB):  LAPAROTOMY, EXPLORATORY (N/A)  CLOSURE, ULCER, PERFORATED, DUODENUM, USING OMENTAL PATCH  BIOPSY, LIVER  INSERTION, PEG TUBE   10 Days Post-Op     Interval History: AF and VSS overnight, sating % overnight. ID was consulted yesterday, suspect aspiration pneumonia vs intra-abd process as source of fever, but COVID r/o test sent. Her WBC is down trending this AM. Her pain is well-controlled.      Medications:  Continuous Infusions:   dextrose 5 % and 0.45 % NaCl       Scheduled Meds:   acetaminophen  1,000 mg Oral Q8H    enoxaparin  40 mg Subcutaneous Daily    fentaNYL  1 patch Transdermal Q72H    levothyroxine  50 mcg Oral Before breakfast    magnesium oxide  800 mg Oral Daily    pantoprazole  40 mg Oral BID AC    piperacillin-tazobactam (ZOSYN) IVPB  4.5 g Intravenous Q8H     PRN Meds:Dextrose 10% Bolus, Dextrose 10% Bolus, diphenhydrAMINE, HYDROmorphone, influenza, levalbuterol, naloxone, ondansetron, oxyCODONE, oxyCODONE, prochlorperazine, sodium chloride 0.9%     Review of patient's allergies indicates:   Allergen Reactions    Azithromycin Other (See Comments)     Stomach pain     Codeine Hallucinations     Objective:     Vital Signs (Most Recent):  Temp: 96.1 °F (35.6 °C) (03/20/20 0350)  Pulse: 78 (03/20/20 0350)  Resp: 16 (03/20/20 0350)  BP: (!) 94/50 (03/20/20 0350)  SpO2: 100 % (03/20/20 0350) Vital Signs (24h Range):  Temp:  [96.1 °F (35.6 °C)-98.5 °F (36.9 °C)] 96.1 °F (35.6 °C)  Pulse:   [78-93] 78  Resp:  [16-20] 16  SpO2:  [92 %-100 %] 100 %  BP: ()/(50-62) 94/50     Weight: 47 kg (103 lb 9.9 oz)  Body mass index is 18.95 kg/m².    Intake/Output - Last 3 Shifts       03/18 0700 - 03/19 0659 03/19 0700 - 03/20 0659    P.O. 1080 1140    I.V. (mL/kg) 708.3 (15.1)     IV Piggyback  100    Total Intake(mL/kg) 1788.3 (38) 1240 (26.4)    Urine (mL/kg/hr) 2100 (1.9) 575 (0.5)    Drains 150 175    Stool 0 0    Total Output 2250 750    Net -461.7 +490          Urine Occurrence 2 x     Stool Occurrence 0 x 0 x          Physical Exam   Constitutional: She is oriented to person, place, and time.   Lethargic   Cardiovascular: Normal rate and regular rhythm.   Pulmonary/Chest: Effort normal. No respiratory distress.   Abdominal: Soft. She exhibits no distension. There is tenderness (Mild epigastric).   Midline incision c/d/i  R nephrostomy tube   Neurological: She is alert and oriented to person, place, and time.   Psychiatric: She has a normal mood and affect.   Nursing note and vitals reviewed.      Significant Labs:  Reviewed    Significant Diagnostics:  Reviewed   CT C/A/P:   In this patient with history of pancreatic adenocarcinoma and duodenal perforation, there has been interval placement of duodenal stent.  Pancreatic lesion is similar in appearance when compared to CT 01/27/2020 with pancreatic ductal dilatation and atrophy of the pancreatic body and tail.    Consolidation in the right lower lobe with air bronchograms, as well as surrounding heterogeneous subsegmental ground-glass opacities throughout the left upper, right upper, and left lower lobes.  Diagnostic considerations include aspiration and pneumonia, including atypical agent such is Covid-19.    Multiple devices in stable position as described above.    Stable pneumobilia.    Scattered mesenteric edema as well as trace fluid in the abdomen and pelvis.    Multiple prominent loops of small bowel measuring up to 2.8 cm, with no sharply  defined transition point to suggest small bowel obstruction.  These findings are likely secondary to ileus.    Assessment/Plan:     * Pneumoperitoneum  75 y/o female with pancreatic cancer with plans to undergo Whipple with right nephrectomy and caval reconstruction with Dr. Wall. Transferred in with pneumoperitoneum. S/p exlap with repair of duodenal perforation and peritoneal biopsies on 3/10. UGI on 3/14 demonstrating no evidence of leak.     - NPO, mIVF  - DC Boost  - Clamp G-tube  - Unclamp for nausea or vomiting not controlled by anti-emetics  - Anti-emetics PRN  - PT/OT  - Encourage OOB, ambulation in halls  - Pulmonary hygiene - IS, ACAPELLA, deep breathing  - Mechanical and chemical SDVT prophylaxis  - Replace electrolytes PRN  - Zosyn for suspected aspiration pneumonia, f/u COVID test        Stefano Robledo MD  General Surgery  [facility]

## 2020-03-20 NOTE — SUBJECTIVE & OBJECTIVE
Interval History:   Pain improved  Feels exhausted  CT chest yesterday consistent with aspiration PNA    Medications:  Continuous Infusions:   sodium chloride 0.9%       Scheduled Meds:   acetaminophen  1,000 mg Oral Q8H    enoxaparin  40 mg Subcutaneous Daily    fentaNYL  1 patch Transdermal Q72H    levothyroxine  50 mcg Oral Before breakfast    magnesium oxide  800 mg Oral Daily    pantoprazole  40 mg Oral BID AC    piperacillin-tazobactam (ZOSYN) IVPB  4.5 g Intravenous Q8H     PRN Meds:Dextrose 10% Bolus, Dextrose 10% Bolus, diphenhydrAMINE, HYDROmorphone, influenza, levalbuterol, naloxone, ondansetron, oxyCODONE, oxyCODONE, prochlorperazine, sodium chloride 0.9%     Review of patient's allergies indicates:   Allergen Reactions    Azithromycin Other (See Comments)     Stomach pain     Codeine Hallucinations     Objective:     Vital Signs (Most Recent):  Temp: 96.1 °F (35.6 °C) (03/20/20 0350)  Pulse: 78 (03/20/20 0350)  Resp: 16 (03/20/20 0350)  BP: (!) 94/50 (03/20/20 0350)  SpO2: 100 % (03/20/20 0350) Vital Signs (24h Range):  Temp:  [96.1 °F (35.6 °C)-98.5 °F (36.9 °C)] 96.1 °F (35.6 °C)  Pulse:  [78-93] 78  Resp:  [16-20] 16  SpO2:  [92 %-100 %] 100 %  BP: ()/(50-62) 94/50     Weight: 47 kg (103 lb 9.9 oz)  Body mass index is 18.95 kg/m².    Intake/Output - Last 3 Shifts       03/18 0700 - 03/19 0659 03/19 0700 - 03/20 0659 03/20 0700 - 03/21 0659    P.O. 1080 1140     I.V. (mL/kg) 708.3 (15.1)      IV Piggyback  100     Total Intake(mL/kg) 1788.3 (38) 1240 (26.4)     Urine (mL/kg/hr) 2100 (1.9) 575 (0.5)     Emesis/NG output       Drains 150 175     Stool 0 0     Total Output 2250 750     Net -461.7 +490            Urine Occurrence 2 x      Stool Occurrence 0 x 0 x           Physical Exam   Constitutional: She is oriented to person, place, and time. She appears well-developed and well-nourished.   HENT:   Head: Normocephalic and atraumatic.   Cardiovascular: Normal rate and regular rhythm.    Pulmonary/Chest: Effort normal. No respiratory distress.   Abdominal: Soft. She exhibits no distension. There is no tenderness.   Midline incision c/d/i  R nephrostomy tube   Musculoskeletal: Normal range of motion. She exhibits no edema.   Neurological: She is alert and oriented to person, place, and time.   Skin: Skin is warm and dry. She is not diaphoretic.   Psychiatric: She has a normal mood and affect.   Nursing note and vitals reviewed.      Significant Labs:  Reviewed    Significant Diagnostics:  Reviewed

## 2020-03-20 NOTE — SUBJECTIVE & OBJECTIVE
Interval History:   Patient has been afebrile  Denied shortness of breath, off oxygen  Notes cough when she takes in a deep breath    Review of Systems   Constitutional: Negative for chills, diaphoresis and fever.   HENT: Negative for rhinorrhea and sore throat.    Respiratory: Negative for cough and shortness of breath.    Cardiovascular: Negative for chest pain and leg swelling.   Gastrointestinal: Negative for abdominal pain, diarrhea, nausea and vomiting.   Genitourinary: Negative for dysuria and hematuria.   Musculoskeletal: Negative for arthralgias and myalgias.   Skin: Negative for rash.   Neurological: Negative for headaches.     Objective:     Vital Signs (Most Recent):  Temp: 98.2 °F (36.8 °C) (03/20/20 1202)  Pulse: 76 (03/20/20 1202)  Resp: 16 (03/20/20 1202)  BP: 101/62 (03/20/20 1202)  SpO2: 98 % (03/20/20 1202) Vital Signs (24h Range):  Temp:  [96.1 °F (35.6 °C)-98.5 °F (36.9 °C)] 98.2 °F (36.8 °C)  Pulse:  [72-93] 76  Resp:  [15-20] 16  SpO2:  [96 %-100 %] 98 %  BP: ()/(50-62) 101/62     Weight: 47 kg (103 lb 9.9 oz)  Body mass index is 18.95 kg/m².    Estimated Creatinine Clearance: 45.1 mL/min (based on SCr of 0.8 mg/dL).    Physical Exam   Constitutional: She is oriented to person, place, and time. She appears well-developed and well-nourished. No distress.   HENT:   Head: Normocephalic and atraumatic.   Eyes: Conjunctivae and EOM are normal.   Neck: Normal range of motion. Neck supple.   Cardiovascular: Normal rate, regular rhythm and normal heart sounds.   Pulmonary/Chest: Effort normal and breath sounds normal. No respiratory distress. She has no wheezes. She has no rales.   Abdominal: Soft. Bowel sounds are normal. She exhibits no distension and no mass. There is no guarding.   Musculoskeletal: Normal range of motion. She exhibits no edema.   Neurological: She is alert and oriented to person, place, and time.   Skin: Skin is warm and dry. No rash noted. She is not diaphoretic. No  erythema.   Psychiatric: She has a normal mood and affect. Her behavior is normal.   Vitals reviewed.      Significant Labs: All pertinent labs within the past 24 hours have been reviewed.    Significant Imaging: I have reviewed all pertinent imaging results/findings within the past 24 hours.

## 2020-03-20 NOTE — PLAN OF CARE
[facility]       HOME HEALTH  FACE TO FACE ENCOUNTER WITH TRANSITION TO AIM PROGRAM.    Patient Name: Julee Hawkins  YOB: 1945    PCP: Shalom Cerna MD   PCP Address: [facility] [place] [address]  PCP Phone Number: [phone]  PCP Fax: [phone]    Encounter Date: 3/25/2020      Diagnoses:  Active Hospital Problems    Diagnosis  POA    *Pneumoperitoneum [K66.8]  Yes    Peritonitis [K65.9]  Yes    Severe protein-calorie malnutrition [E43]  Yes      Resolved Hospital Problems   No resolved problems to display.       I have seen and examined this patient face to face today. My clinical findings that support the need for the home health skilled services and home bound status are the following:  Weakness/numbness causing balance and gait disturbance due to Surgery making it taxing to leave home.    Allergies:  Review of patient's allergies indicates:   Allergen Reactions    Azithromycin Other (See Comments)     Stomach pain     Codeine Hallucinations       Diet: dysphagia mechanical soft diet    Activities: activity as tolerated    Nursing:   SN to complete comprehensive assessment including routine vital signs. Instruct on disease process and s/s of complications to report to MD. Review/verify medication list sent home with the patient at time of discharge  and instruct patient/caregiver as needed. Frequency may be adjusted depending on start of care date.    Notify MD if SBP > 160 or < 90; DBP > 90 or < 50; HR > 120 or < 50; Temp > 101      CONSULTS:    Physical Therapy to evaluate and treat. Evaluate for home safety and equipment needs; Establish/upgrade home exercise program. Perform / instruct on therapeutic exercises, gait training, transfer training, and Range of Motion.  Occupational Therapy to evaluate and treat. Evaluate home environment for safety and equipment needs. Perform/Instruct on transfers, ADL training, ROM,  and therapeutic exercises.   to evaluate for community resources/long-range planning.  Aide to provide assistance with personal care, ADLs, and vital signs.      MISCELLANEOUS CARE:  PEG Care:  Instruct patient/caregiver to clean site.  Monitor skin integrity.   Clamp peg tube.  Unclamp peg tube for nausea/vomiting.    Right nephrostomy tube to gravity bag.     Medications: Review discharge medications with patient and family and provide education.      Current Discharge Medication List      START taking these medications    Details   fentaNYL (DURAGESIC) 12 mcg/hr PT72 Place 1 patch onto the skin every 72 hours.  Qty: 5 patch, Refills: 0    Comments: n/a       oxyCODONE (ROXICODONE) 10 mg Tab immediate release tablet Take 1 tablet (10 mg total) by mouth every 4 (four) hours as needed.  Qty: 20 tablet, Refills: 0    Comments: n/a          CONTINUE these medications which have NOT CHANGED    Details   buPROPion (WELLBUTRIN XL) 150 MG TB24 tablet Take 150 mg by mouth once daily.      gabapentin (NEURONTIN) 300 MG capsule Take 300 mg by mouth 3 (three) times daily.      levothyroxine (SYNTHROID) 50 MCG tablet Take 50 mcg by mouth once daily.      !! ondansetron (ZOFRAN-ODT) 4 MG TbDL DISSOLVE 1 TABLET UNDER TONGUE EVERY 6 HOURS AS NEEDED FOR NAUSEA AND VOMITING  Refills: 0      pantoprazole (PROTONIX) 40 MG tablet Take 40 mg by mouth once daily.      prochlorperazine (COMPAZINE) 10 MG tablet Take 10 mg by mouth every 6 (six) hours as needed.      acetaminophen (TYLENOL) 650 MG TbSR Take 1,000 mg by mouth 3 (three) times daily.       aspirin (ECOTRIN) 81 MG EC tablet Take 81 mg by mouth once daily.      b complex vitamins capsule Take 1 capsule by mouth once daily.      calcium carbonate 1250 MG capsule Take 1,250 mg by mouth 2 (two) times daily with meals.      celecoxib (CELEBREX) 200 MG capsule TK 1 C PO QAM  Refills: 1      cholecalciferol, vitamin D3, (VITAMIN D3) 10 mcg/mL (400 unit/mL) Drop Take by  mouth.      cycloSPORINE (RESTASIS) 0.05 % ophthalmic emulsion 1 drop 2 (two) times daily.      denosumab (PROLIA) 60 mg/mL Syrg Inject 60 mg into the skin.      ergocalciferol (VITAMIN D2) 50,000 unit Cap Take 8,000 Units by mouth every 7 days.       lipase-protease-amylase 24,000-76,000-120,000 units (CREON) 24,000-76,000 -120,000 unit capsule Take 1 capsule by mouth 3 (three) times daily with meals.  Qty: 270 capsule, Refills: 3    Associated Diagnoses: Malignant neoplasm of head of pancreas      multivitamin/iron/folic acid (CENTRUM COMPLETE ORAL) Take by mouth.      !! ondansetron (ZOFRAN-ODT) 8 MG TbDL Take 8 mg by mouth once.      sertraline (ZOLOFT) 25 MG tablet Take 25 mg by mouth once daily.      traMADol (ULTRAM) 50 mg tablet Take 50 mg by mouth every 6 (six) hours as needed for Pain.      TURMERIC ORAL Take by mouth.       !! - Potential duplicate medications found. Please discuss with provider.      STOP taking these medications       methocarbamoL (ROBAXIN) 500 MG Tab Comments:   Reason for Stopping:         OLANZapine (ZYPREXA) 5 MG tablet Comments:   Reason for Stopping:         HYDROcodone-acetaminophen (NORCO) 7.5-325 mg per tablet Comments:   Reason for Stopping:         oxyCODONE-acetaminophen (PERCOCET)  mg per tablet Comments:   Reason for Stopping:         tiZANidine (ZANAFLEX) 4 MG tablet Comments:   Reason for Stopping:               I certify that this patient is confined to her home and needs intermittent skilled nursing care, physical therapy and occupational therapy.    Electronically Signed  _________________________________  Kate Gabriel NP  3/25/2020

## 2020-03-20 NOTE — PROGRESS NOTES
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  Pt has been having abdominal pain since Saturday.  Transferred after CT findings.  Reportedly had been responding to neoadjuvant chemo based on last PET    Post-Op Info:  Procedure(s) (LRB):  LAPAROTOMY, EXPLORATORY (N/A)  CLOSURE, ULCER, PERFORATED, DUODENUM, USING OMENTAL PATCH  BIOPSY, LIVER  INSERTION, PEG TUBE   10 Days Post-Op     Interval History:   Pain improved  Feels exhausted  CT chest yesterday consistent with aspiration PNA    Medications:  Continuous Infusions:   sodium chloride 0.9%       Scheduled Meds:   acetaminophen  1,000 mg Oral Q8H    enoxaparin  40 mg Subcutaneous Daily    fentaNYL  1 patch Transdermal Q72H    levothyroxine  50 mcg Oral Before breakfast    magnesium oxide  800 mg Oral Daily    pantoprazole  40 mg Oral BID AC    piperacillin-tazobactam (ZOSYN) IVPB  4.5 g Intravenous Q8H     PRN Meds:Dextrose 10% Bolus, Dextrose 10% Bolus, diphenhydrAMINE, HYDROmorphone, influenza, levalbuterol, naloxone, ondansetron, oxyCODONE, oxyCODONE, prochlorperazine, sodium chloride 0.9%     Review of patient's allergies indicates:   Allergen Reactions    Azithromycin Other (See Comments)     Stomach pain     Codeine Hallucinations     Objective:     Vital Signs (Most Recent):  Temp: 96.1 °F (35.6 °C) (03/20/20 0350)  Pulse: 78 (03/20/20 0350)  Resp: 16 (03/20/20 0350)  BP: (!) 94/50 (03/20/20 0350)  SpO2: 100 % (03/20/20 0350) Vital Signs (24h Range):  Temp:  [96.1 °F (35.6 °C)-98.5 °F (36.9 °C)] 96.1 °F (35.6 °C)  Pulse:  [78-93] 78  Resp:  [16-20] 16  SpO2:  [92 %-100 %] 100 %  BP: ()/(50-62) 94/50     Weight: 47 kg (103 lb 9.9 oz)  Body mass index is 18.95  kg/m².    Intake/Output - Last 3 Shifts       03/18 0700 - 03/19 0659 03/19 0700 - 03/20 0659 03/20 0700 - 03/21 0659    P.O. 1080 1140     I.V. (mL/kg) 708.3 (15.1)      IV Piggyback  100     Total Intake(mL/kg) 1788.3 (38) 1240 (26.4)     Urine (mL/kg/hr) 2100 (1.9) 575 (0.5)     Emesis/NG output       Drains 150 175     Stool 0 0     Total Output 2250 750     Net -461.7 +490            Urine Occurrence 2 x      Stool Occurrence 0 x 0 x           Physical Exam   Constitutional: She is oriented to person, place, and time. She appears well-developed and well-nourished.   HENT:   Head: Normocephalic and atraumatic.   Cardiovascular: Normal rate and regular rhythm.   Pulmonary/Chest: Effort normal. No respiratory distress.   Abdominal: Soft. She exhibits no distension. There is no tenderness.   Midline incision c/d/i  R nephrostomy tube   Musculoskeletal: Normal range of motion. She exhibits no edema.   Neurological: She is alert and oriented to person, place, and time.   Skin: Skin is warm and dry. She is not diaphoretic.   Psychiatric: She has a normal mood and affect.   Nursing note and vitals reviewed.      Significant Labs:  Reviewed    Significant Diagnostics:  Reviewed    Assessment/Plan:     * Pneumoperitoneum  75 y/o female with pancreatic cancer with plans to undergo Whipple with right nephrectomy and caval reconstruction with Dr. Wall. Transferred in with pneumoperitoneum. S/p exlap with repair of duodenal perforation and peritoneal biopsies on 3/10. UGI on 3/14 demonstrating no evidence of leak.     - NPO, mIVF  - Clamp G-tube  - Unclamp for nausea or vomiting not controlled by anti-emetics  - Anti-emetics PRN  - PT/OT  - Encourage OOB, ambulation   - Pulmonary hygiene - IS, ACAPELLA, deep breathing  - Mechanical and chemical DVT prophylaxis  - Replace electrolytes PRN  - Zosyn for suspected aspiration pneumonia, f/u COVID testing; appreciate ID recs        Leland Parson MD  General Surgery  [facility]

## 2020-03-20 NOTE — SUBJECTIVE & OBJECTIVE
Interval History: AF and VSS overnight, sating % overnight. ID was consulted yesterday, suspect aspiration pneumonia vs intra-abd process as source of fever, but COVID r/o test sent. Her WBC is down trending this AM. Her pain is well-controlled.      Medications:  Continuous Infusions:   dextrose 5 % and 0.45 % NaCl       Scheduled Meds:   acetaminophen  1,000 mg Oral Q8H    enoxaparin  40 mg Subcutaneous Daily    fentaNYL  1 patch Transdermal Q72H    levothyroxine  50 mcg Oral Before breakfast    magnesium oxide  800 mg Oral Daily    pantoprazole  40 mg Oral BID AC    piperacillin-tazobactam (ZOSYN) IVPB  4.5 g Intravenous Q8H     PRN Meds:Dextrose 10% Bolus, Dextrose 10% Bolus, diphenhydrAMINE, HYDROmorphone, influenza, levalbuterol, naloxone, ondansetron, oxyCODONE, oxyCODONE, prochlorperazine, sodium chloride 0.9%     Review of patient's allergies indicates:   Allergen Reactions    Azithromycin Other (See Comments)     Stomach pain     Codeine Hallucinations     Objective:     Vital Signs (Most Recent):  Temp: 96.1 °F (35.6 °C) (03/20/20 0350)  Pulse: 78 (03/20/20 0350)  Resp: 16 (03/20/20 0350)  BP: (!) 94/50 (03/20/20 0350)  SpO2: 100 % (03/20/20 0350) Vital Signs (24h Range):  Temp:  [96.1 °F (35.6 °C)-98.5 °F (36.9 °C)] 96.1 °F (35.6 °C)  Pulse:  [78-93] 78  Resp:  [16-20] 16  SpO2:  [92 %-100 %] 100 %  BP: ()/(50-62) 94/50     Weight: 47 kg (103 lb 9.9 oz)  Body mass index is 18.95 kg/m².    Intake/Output - Last 3 Shifts       03/18 0700 - 03/19 0659 03/19 0700 - 03/20 0659    P.O. 1080 1140    I.V. (mL/kg) 708.3 (15.1)     IV Piggyback  100    Total Intake(mL/kg) 1788.3 (38) 1240 (26.4)    Urine (mL/kg/hr) 2100 (1.9) 575 (0.5)    Drains 150 175    Stool 0 0    Total Output 2250 750    Net -461.7 +490          Urine Occurrence 2 x     Stool Occurrence 0 x 0 x          Physical Exam   Constitutional: She is oriented to person, place, and time.   Lethargic   Cardiovascular: Normal rate  and regular rhythm.   Pulmonary/Chest: Effort normal. No respiratory distress.   Abdominal: Soft. She exhibits no distension. There is tenderness (Mild epigastric).   Midline incision c/d/i  R nephrostomy tube   Neurological: She is alert and oriented to person, place, and time.   Psychiatric: She has a normal mood and affect.   Nursing note and vitals reviewed.      Significant Labs:  Reviewed    Significant Diagnostics:  Reviewed   CT C/A/P:   In this patient with history of pancreatic adenocarcinoma and duodenal perforation, there has been interval placement of duodenal stent.  Pancreatic lesion is similar in appearance when compared to CT 01/27/2020 with pancreatic ductal dilatation and atrophy of the pancreatic body and tail.    Consolidation in the right lower lobe with air bronchograms, as well as surrounding heterogeneous subsegmental ground-glass opacities throughout the left upper, right upper, and left lower lobes.  Diagnostic considerations include aspiration and pneumonia, including atypical agent such is Covid-19.    Multiple devices in stable position as described above.    Stable pneumobilia.    Scattered mesenteric edema as well as trace fluid in the abdomen and pelvis.    Multiple prominent loops of small bowel measuring up to 2.8 cm, with no sharply defined transition point to suggest small bowel obstruction.  These findings are likely secondary to ileus.

## 2020-03-20 NOTE — ASSESSMENT & PLAN NOTE
74-year-old female with history of pancreatic CA on neoadjuvant chemo w/ plans for whipple who presented on 3/10/2020 with abdominal pain, found to have pneumoperitoneum and pneumobilia, now s/p ex-lap w/ discovery of duodenal perf, carcinomatosis. Patient was placed empirically on pip-tazo and fluc from 3/10-13.     Patient developed new fever to 102.6 on 3/18/2020 after aspiration event after G-tube was clamped.  On evaluation, noted to have increased oxygen requirement (2L), increased WBC to 14, new RLL infiltrate on CXR.     CT abd/pelvis with consolidation in RLL with airbronchograms consistent with aspiration pneumonia / pneumonitis, no evidence of intraabdominal infection.    Recommendations:   - Discontinue pip-tazo, start ampicillin-sulbactam for aspiration pneumonia  - On discharge, okay to transition to amox-clav 875-125 mg PO BID  - Total 7 day course of antibiotics, last day 3/25/2020  - Follow-up COVID-19 testing - continue airborne-contact-droplet precautions for now

## 2020-03-20 NOTE — ASSESSMENT & PLAN NOTE
73 y/o female with pancreatic cancer with plans to undergo Whipple with right nephrectomy and caval reconstruction with Dr. Wall. Transferred in with pneumoperitoneum. S/p exlap with repair of duodenal perforation and peritoneal biopsies on 3/10. UGI on 3/14 demonstrating no evidence of leak.     - NPO, mIVF  - Clamp G-tube  - Unclamp for nausea or vomiting not controlled by anti-emetics  - Anti-emetics PRN  - PT/OT  - Encourage OOB, ambulation   - Pulmonary hygiene - IS, ACAPELLA, deep breathing  - Mechanical and chemical DVT prophylaxis  - Replace electrolytes PRN  - Zosyn for suspected aspiration pneumonia, f/u COVID testing; appreciate ID recs

## 2020-03-20 NOTE — PLAN OF CARE
Patient was awaiting insurance authorization for [facility] when she started with temp yesterday.  Patient is pending Covid 19 test vs aspiration pneumonia.  Patient not medically stable for discharge until next week. Will continue to follow for needs.       03/20/20 0844   Discharge Reassessment   Assessment Type Discharge Planning Reassessment   Discharge Plan A Skilled Nursing Facility   Discharge Plan B Home with family;Home Health   Post-Acute Status   Post-Acute Authorization Placement   Discharge Delays (!) Change in Medical Condition

## 2020-03-20 NOTE — PLAN OF CARE
POC reviewed with patient, understanding verbalized. Pt ANOx4, VSS on 3L NC. ML incision w/ staples remains approximated and dry. LLQ G tube unclamped x1 d/t c/o nausea, received 175 of green output. Nephrostomy tube continues putting out clear yellow urine and patient up to bedside commode x1. Pain well controlled with PRN meds tonight although pt didn't sleep at all. Airborne/droplet/contact precautions maintained. Pt able to make needs known and remains free of fall or injury as safety measures intact. No further questions or needs reported at this time. Will continue to monitor.

## 2020-03-21 PROBLEM — J69.0 ASPIRATION PNEUMONIA: Status: ACTIVE | Noted: 2020-03-21

## 2020-03-21 LAB
ALBUMIN SERPL BCP-MCNC: 2 G/DL (ref 3.5–5.2)
ALP SERPL-CCNC: 558 U/L (ref 55–135)
ALT SERPL W/O P-5'-P-CCNC: 15 U/L (ref 10–44)
ANION GAP SERPL CALC-SCNC: 9 MMOL/L (ref 8–16)
AST SERPL-CCNC: 21 U/L (ref 10–40)
BASOPHILS # BLD AUTO: 0.02 K/UL (ref 0–0.2)
BASOPHILS NFR BLD: 0.2 % (ref 0–1.9)
BILIRUB SERPL-MCNC: 1.1 MG/DL (ref 0.1–1)
BUN SERPL-MCNC: 10 MG/DL (ref 8–23)
CALCIUM SERPL-MCNC: 8.3 MG/DL (ref 8.7–10.5)
CHLORIDE SERPL-SCNC: 97 MMOL/L (ref 95–110)
CO2 SERPL-SCNC: 23 MMOL/L (ref 23–29)
CREAT SERPL-MCNC: 0.8 MG/DL (ref 0.5–1.4)
DIFFERENTIAL METHOD: ABNORMAL
EOSINOPHIL # BLD AUTO: 0 K/UL (ref 0–0.5)
EOSINOPHIL NFR BLD: 0.4 % (ref 0–8)
ERYTHROCYTE [DISTWIDTH] IN BLOOD BY AUTOMATED COUNT: 15.9 % (ref 11.5–14.5)
EST. GFR  (AFRICAN AMERICAN): >60 ML/MIN/1.73 M^2
EST. GFR  (NON AFRICAN AMERICAN): >60 ML/MIN/1.73 M^2
GLUCOSE SERPL-MCNC: 65 MG/DL (ref 70–110)
HCT VFR BLD AUTO: 23 % (ref 37–48.5)
HGB BLD-MCNC: 7.4 G/DL (ref 12–16)
IMM GRANULOCYTES # BLD AUTO: 0.04 K/UL (ref 0–0.04)
IMM GRANULOCYTES NFR BLD AUTO: 0.4 % (ref 0–0.5)
LYMPHOCYTES # BLD AUTO: 0.7 K/UL (ref 1–4.8)
LYMPHOCYTES NFR BLD: 8 % (ref 18–48)
MAGNESIUM SERPL-MCNC: 1.6 MG/DL (ref 1.6–2.6)
MCH RBC QN AUTO: 31 PG (ref 27–31)
MCHC RBC AUTO-ENTMCNC: 32.2 G/DL (ref 32–36)
MCV RBC AUTO: 96 FL (ref 82–98)
MONOCYTES # BLD AUTO: 0.6 K/UL (ref 0.3–1)
MONOCYTES NFR BLD: 6.1 % (ref 4–15)
NEUTROPHILS # BLD AUTO: 7.7 K/UL (ref 1.8–7.7)
NEUTROPHILS NFR BLD: 84.9 % (ref 38–73)
NRBC BLD-RTO: 0 /100 WBC
PHOSPHATE SERPL-MCNC: 2.7 MG/DL (ref 2.7–4.5)
PLATELET # BLD AUTO: 294 K/UL (ref 150–350)
PMV BLD AUTO: 9.9 FL (ref 9.2–12.9)
POTASSIUM SERPL-SCNC: 3.8 MMOL/L (ref 3.5–5.1)
PROT SERPL-MCNC: 5.5 G/DL (ref 6–8.4)
RBC # BLD AUTO: 2.39 M/UL (ref 4–5.4)
SODIUM SERPL-SCNC: 129 MMOL/L (ref 136–145)
WBC # BLD AUTO: 9.04 K/UL (ref 3.9–12.7)

## 2020-03-21 PROCEDURE — 63600175 PHARM REV CODE 636 W HCPCS: Performed by: SURGERY

## 2020-03-21 PROCEDURE — 94664 DEMO&/EVAL PT USE INHALER: CPT

## 2020-03-21 PROCEDURE — 25000003 PHARM REV CODE 250: Performed by: STUDENT IN AN ORGANIZED HEALTH CARE EDUCATION/TRAINING PROGRAM

## 2020-03-21 PROCEDURE — 63600175 PHARM REV CODE 636 W HCPCS: Performed by: STUDENT IN AN ORGANIZED HEALTH CARE EDUCATION/TRAINING PROGRAM

## 2020-03-21 PROCEDURE — 85025 COMPLETE CBC W/AUTO DIFF WBC: CPT

## 2020-03-21 PROCEDURE — 63600175 PHARM REV CODE 636 W HCPCS: Performed by: INTERNAL MEDICINE

## 2020-03-21 PROCEDURE — 83735 ASSAY OF MAGNESIUM: CPT

## 2020-03-21 PROCEDURE — 99233 SBSQ HOSP IP/OBS HIGH 50: CPT | Mod: ,,, | Performed by: INTERNAL MEDICINE

## 2020-03-21 PROCEDURE — 20600001 HC STEP DOWN PRIVATE ROOM

## 2020-03-21 PROCEDURE — 99233 PR SUBSEQUENT HOSPITAL CARE,LEVL III: ICD-10-PCS | Mod: ,,, | Performed by: INTERNAL MEDICINE

## 2020-03-21 PROCEDURE — 80053 COMPREHEN METABOLIC PANEL: CPT

## 2020-03-21 PROCEDURE — 84100 ASSAY OF PHOSPHORUS: CPT

## 2020-03-21 PROCEDURE — 63600175 PHARM REV CODE 636 W HCPCS: Performed by: NURSE PRACTITIONER

## 2020-03-21 PROCEDURE — 25000003 PHARM REV CODE 250: Performed by: GENERAL PRACTICE

## 2020-03-21 PROCEDURE — 25000003 PHARM REV CODE 250: Performed by: NURSE PRACTITIONER

## 2020-03-21 RX ADMIN — LEVOTHYROXINE SODIUM 50 MCG: 25 TABLET ORAL at 06:03

## 2020-03-21 RX ADMIN — OXYCODONE HYDROCHLORIDE 10 MG: 10 TABLET ORAL at 09:03

## 2020-03-21 RX ADMIN — ENOXAPARIN SODIUM 40 MG: 100 INJECTION SUBCUTANEOUS at 05:03

## 2020-03-21 RX ADMIN — SODIUM CHLORIDE: 0.9 INJECTION, SOLUTION INTRAVENOUS at 06:03

## 2020-03-21 RX ADMIN — OXYCODONE HYDROCHLORIDE 10 MG: 10 TABLET ORAL at 05:03

## 2020-03-21 RX ADMIN — OXYCODONE HYDROCHLORIDE 10 MG: 10 TABLET ORAL at 03:03

## 2020-03-21 RX ADMIN — AMPICILLIN SODIUM AND SULBACTAM SODIUM 3 G: 2; 1 INJECTION, POWDER, FOR SOLUTION INTRAMUSCULAR; INTRAVENOUS at 06:03

## 2020-03-21 RX ADMIN — Medication 800 MG: at 09:03

## 2020-03-21 RX ADMIN — OXYCODONE HYDROCHLORIDE 10 MG: 10 TABLET ORAL at 01:03

## 2020-03-21 RX ADMIN — OXYCODONE HYDROCHLORIDE 10 MG: 10 TABLET ORAL at 11:03

## 2020-03-21 RX ADMIN — PANTOPRAZOLE SODIUM 40 MG: 40 TABLET, DELAYED RELEASE ORAL at 05:03

## 2020-03-21 RX ADMIN — PANTOPRAZOLE SODIUM 40 MG: 40 TABLET, DELAYED RELEASE ORAL at 06:03

## 2020-03-21 RX ADMIN — AMPICILLIN SODIUM AND SULBACTAM SODIUM 3 G: 2; 1 INJECTION, POWDER, FOR SOLUTION INTRAMUSCULAR; INTRAVENOUS at 11:03

## 2020-03-21 RX ADMIN — AMPICILLIN SODIUM AND SULBACTAM SODIUM 3 G: 2; 1 INJECTION, POWDER, FOR SOLUTION INTRAMUSCULAR; INTRAVENOUS at 01:03

## 2020-03-21 RX ADMIN — HYDROMORPHONE HYDROCHLORIDE 0.5 MG: 1 INJECTION, SOLUTION INTRAMUSCULAR; INTRAVENOUS; SUBCUTANEOUS at 08:03

## 2020-03-21 RX ADMIN — HYDROMORPHONE HYDROCHLORIDE 0.5 MG: 1 INJECTION, SOLUTION INTRAMUSCULAR; INTRAVENOUS; SUBCUTANEOUS at 02:03

## 2020-03-21 NOTE — ASSESSMENT & PLAN NOTE
74-year-old female with history of pancreatic CA on neoadjuvant chemo w/ plans for whipple who presented on 3/10/2020 with abdominal pain, found to have pneumoperitoneum and pneumobilia, now s/p ex-lap w/ discovery of duodenal perf, carcinomatosis. Patient was placed empirically on pip-tazo and fluc from 3/10-13.     Patient developed new fever to 102.6 on 3/18/2020 after aspiration event after G-tube was clamped.  On evaluation, noted to have increased oxygen requirement (2L), increased WBC to 14, new RLL infiltrate on CXR.     CT abd/pelvis with consolidation in RLL with airbronchograms consistent with aspiration pneumonia / pneumonitis, no evidence of intraabdominal infection.    Patient now clinically improving with 2 days of antibiotics IV.    Recommendations:   - Continue ampicillin-sulbactam IV for aspiration pneumonia  - Total 7 day course of antibiotics, last day 3/25/2020  - Follow-up COVID-19 testing - continue airborne-contact-droplet precautions for now

## 2020-03-21 NOTE — PROGRESS NOTES
[facility]  Infectious Disease  Progress Note    Patient Name: Julee Hawkins  MRN: [id number]  Admission Date: 3/10/2020  Length of Stay: 11 days  Attending Physician: Quang Wall MD  Primary Care Provider: Shalom Cerna MD    Isolation Status: Airborne and Contact and Droplet  Assessment/Plan:      Aspiration pneumonia  74-year-old female with history of pancreatic CA on neoadjuvant chemo w/ plans for whipple who presented on 3/10/2020 with abdominal pain, found to have pneumoperitoneum and pneumobilia, now s/p ex-lap w/ discovery of duodenal perf, carcinomatosis. Patient was placed empirically on pip-tazo and fluc from 3/10-13.     Patient developed new fever to 102.6 on 3/18/2020 after aspiration event after G-tube was clamped.  On evaluation, noted to have increased oxygen requirement (2L), increased WBC to 14, new RLL infiltrate on CXR.     CT abd/pelvis with consolidation in RLL with airbronchograms consistent with aspiration pneumonia / pneumonitis, no evidence of intraabdominal infection.    Patient now clinically improving with 2 days of antibiotics IV.    Recommendations:   - Continue ampicillin-sulbactam IV for aspiration pneumonia  - Total 7 day course of antibiotics, last day 3/25/2020  - Follow-up COVID-19 testing - continue airborne-contact-droplet precautions for now        Thank you for your consult. I will sign off. Please contact us if you have any additional questions.    Jenni Leggett MD  Infectious Disease  [facility]    Subjective:     Principal Problem:Pneumoperitoneum    HPI: 74F hx pancreatic CA on neoadjuvant chemo w/ plans for whipple, complicated by R obstructing hydronephrosis s/p nephrostomy tube, duodenal stent and biliary stent, who was admitted on 3/10 w/ abd pain. CT w/ pneumoperitoneum and pneumobilia, now s/p ex-lap on 3/10, found to have duodenal perf (repaired with clyde-type patch) and carcinomatosis. She was started on  fluconazole and pip-tazo empirically. UGI on 3/14 with no leak. Pt also has G tube in place, which is now clamped. Overnight, developed new hypoxia now on 3L w/ concerns for possible aspiration. WBC increased to 14, CXR w/ worsening R sided infiltrate. CT C/A/P ordered, COVID testing sent for r/o. ID consulted for assistance w/ abx.   Interval History:   Patient has been afebrile  Denied shortness of breath  Now starting to cough up sputum, feels lungs clearing up    Review of Systems   Constitutional: Negative for chills, diaphoresis and fever.   HENT: Negative for rhinorrhea and sore throat.    Respiratory: Positive for cough. Negative for chest tightness and shortness of breath.    Cardiovascular: Negative for chest pain and leg swelling.   Gastrointestinal: Negative for abdominal pain, diarrhea, nausea and vomiting.   Genitourinary: Negative for dysuria and hematuria.   Musculoskeletal: Negative for arthralgias and myalgias.   Skin: Negative for rash.   Neurological: Negative for headaches.     Objective:     Vital Signs (Most Recent):  Temp: 97.9 °F (36.6 °C) (03/21/20 1333)  Pulse: 81 (03/21/20 1333)  Resp: 18 (03/21/20 1333)  BP: 120/63 (03/21/20 1333)  SpO2: 98 % (03/21/20 1333) Vital Signs (24h Range):  Temp:  [97.2 °F (36.2 °C)-99.2 °F (37.3 °C)] 97.9 °F (36.6 °C)  Pulse:  [78-84] 81  Resp:  [16-18] 18  SpO2:  [97 %-100 %] 98 %  BP: (108-120)/(57-69) 120/63     Weight: 47 kg (103 lb 9.9 oz)  Body mass index is 18.95 kg/m².    Estimated Creatinine Clearance: 45.1 mL/min (based on SCr of 0.8 mg/dL).    Physical Exam   Constitutional: She is oriented to person, place, and time. She appears well-developed and well-nourished. No distress.   HENT:   Head: Normocephalic and atraumatic.   Eyes: Conjunctivae and EOM are normal.   Neck: Normal range of motion. Neck supple.   Abdominal: Soft. She exhibits no distension.   Musculoskeletal: Normal range of motion. She exhibits no edema.   Neurological: She is alert and  oriented to person, place, and time.   Skin: Skin is warm and dry. No rash noted. She is not diaphoretic. No erythema.   Psychiatric: She has a normal mood and affect. Her behavior is normal.   Vitals reviewed.      Significant Labs: All pertinent labs within the past 24 hours have been reviewed.    Significant Imaging: I have reviewed all pertinent imaging results/findings within the past 24 hours.

## 2020-03-21 NOTE — SUBJECTIVE & OBJECTIVE
Interval History:   Patient has been afebrile  Denied shortness of breath  Now starting to cough up sputum, feels lungs clearing up    Review of Systems   Constitutional: Negative for chills, diaphoresis and fever.   HENT: Negative for rhinorrhea and sore throat.    Respiratory: Positive for cough. Negative for chest tightness and shortness of breath.    Cardiovascular: Negative for chest pain and leg swelling.   Gastrointestinal: Negative for abdominal pain, diarrhea, nausea and vomiting.   Genitourinary: Negative for dysuria and hematuria.   Musculoskeletal: Negative for arthralgias and myalgias.   Skin: Negative for rash.   Neurological: Negative for headaches.     Objective:     Vital Signs (Most Recent):  Temp: 97.9 °F (36.6 °C) (03/21/20 1333)  Pulse: 81 (03/21/20 1333)  Resp: 18 (03/21/20 1333)  BP: 120/63 (03/21/20 1333)  SpO2: 98 % (03/21/20 1333) Vital Signs (24h Range):  Temp:  [97.2 °F (36.2 °C)-99.2 °F (37.3 °C)] 97.9 °F (36.6 °C)  Pulse:  [78-84] 81  Resp:  [16-18] 18  SpO2:  [97 %-100 %] 98 %  BP: (108-120)/(57-69) 120/63     Weight: 47 kg (103 lb 9.9 oz)  Body mass index is 18.95 kg/m².    Estimated Creatinine Clearance: 45.1 mL/min (based on SCr of 0.8 mg/dL).    Physical Exam   Constitutional: She is oriented to person, place, and time. She appears well-developed and well-nourished. No distress.   HENT:   Head: Normocephalic and atraumatic.   Eyes: Conjunctivae and EOM are normal.   Neck: Normal range of motion. Neck supple.   Abdominal: Soft. She exhibits no distension.   Musculoskeletal: Normal range of motion. She exhibits no edema.   Neurological: She is alert and oriented to person, place, and time.   Skin: Skin is warm and dry. No rash noted. She is not diaphoretic. No erythema.   Psychiatric: She has a normal mood and affect. Her behavior is normal.   Vitals reviewed.      Significant Labs: All pertinent labs within the past 24 hours have been reviewed.    Significant Imaging: I have  reviewed all pertinent imaging results/findings within the past 24 hours.

## 2020-03-21 NOTE — PLAN OF CARE
Plan of care reviewed with pt, who verbalized understanding. Pt OOB to commode independently, SBA to BR. Pt NPO w/ meds. One instance of nausea managed w/ PRN meds. Pain (R flank near nephrostomy tube site) controlled w/ PRN meds. Broken nephrostomy connector removed, single way valve placed on w/ clear yellow urine output. Call bell in reach, bed locked in lowest position. Frequent rounds made for pt safety. Pt remaining on R/O covid precautions. AAOx4, VSS, will continue to monitor.

## 2020-03-21 NOTE — PROGRESS NOTES
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  Pt has been having abdominal pain since Saturday.  Transferred after CT findings.  Reportedly had been responding to neoadjuvant chemo based on last PET    Post-Op Info:  Procedure(s) (LRB):  LAPAROTOMY, EXPLORATORY (N/A)  CLOSURE, ULCER, PERFORATED, DUODENUM, USING OMENTAL PATCH  BIOPSY, LIVER  INSERTION, PEG TUBE   11 Days Post-Op     Interval History:   Pt seen and examined at bedside  Pain well controlled. Pain same as yesterday  Voiding spontaneously  Leukocytosis resolved.    Medications:  Continuous Infusions:   sodium chloride 0.9% 75 mL/hr at 03/21/20 0646     Scheduled Meds:   ampicillin-sulbactim (UNASYN) IVPB  3 g Intravenous Q6H    enoxaparin  40 mg Subcutaneous Daily    fentaNYL  1 patch Transdermal Q72H    levothyroxine  50 mcg Oral Before breakfast    magnesium oxide  800 mg Oral Daily    pantoprazole  40 mg Oral BID AC     PRN Meds:Dextrose 10% Bolus, Dextrose 10% Bolus, diphenhydrAMINE, HYDROmorphone, influenza, levalbuterol, naloxone, ondansetron, oxyCODONE, oxyCODONE, prochlorperazine, sodium chloride 0.9%     Review of patient's allergies indicates:   Allergen Reactions    Azithromycin Other (See Comments)     Stomach pain     Codeine Hallucinations     Objective:     Vital Signs (Most Recent):  Temp: 97.8 °F (36.6 °C) (03/21/20 0332)  Pulse: 80 (03/21/20 0332)  Resp: 16 (03/21/20 0332)  BP: 109/67 (03/21/20 0332)  SpO2: 100 % (03/21/20 0332) Vital Signs (24h Range):  Temp:  [97.4 °F (36.3 °C)-99.2 °F (37.3 °C)] 97.8 °F (36.6 °C)  Pulse:  [72-84] 80  Resp:  [15-18] 16  SpO2:  [97 %-100 %] 100 %  BP: ()/(51-69) 109/67     Weight: 47 kg (103 lb 9.9 oz)  Body mass index is  18.95 kg/m².    Intake/Output - Last 3 Shifts       03/19 0700 - 03/20 0659 03/20 0700 - 03/21 0659 03/21 0700 - 03/22 0659    P.O. 1140 120     I.V. (mL/kg)       IV Piggyback 100      Total Intake(mL/kg) 1240 (26.4) 120 (2.6)     Urine (mL/kg/hr) 575 (0.5) 1500 (1.3)     Drains 175      Other  0     Stool 0 0     Total Output 750 1500     Net +490 -1380            Urine Occurrence  1 x     Stool Occurrence 0 x 0 x           Physical Exam   Constitutional: She is oriented to person, place, and time. She appears well-developed and well-nourished.   HENT:   Head: Normocephalic and atraumatic.   Cardiovascular: Normal rate and regular rhythm.   Pulmonary/Chest: Effort normal. No respiratory distress.   Abdominal: Soft. She exhibits no distension. There is no tenderness.   Midline incision c/d/i  R nephrostomy tube   Musculoskeletal: Normal range of motion. She exhibits no edema.   Neurological: She is alert and oriented to person, place, and time.   Skin: Skin is warm and dry. She is not diaphoretic.   Psychiatric: She has a normal mood and affect.   Nursing note and vitals reviewed.      Significant Labs:  Reviewed    Significant Diagnostics:  Reviewed    Assessment/Plan:     * Pneumoperitoneum  73 y/o female with pancreatic cancer with plans to undergo Whipple with right nephrectomy and caval reconstruction with Dr. Wall. Transferred in with pneumoperitoneum. S/p exlap with repair of duodenal perforation and peritoneal biopsies on 3/10. UGI on 3/14 demonstrating no evidence of leak.     - NPO, mIVF  - D/c MIVF when TPN hung  - Starting TPN  - Clamp G-tube  - Unclamp for nausea or vomiting not controlled by anti-emetics  - Anti-emetics PRN  - PT/OT  - Encourage OOB, ambulation   - Pulmonary hygiene - IS, ACAPELLA, deep breathing  - Mechanical and chemical DVT prophylaxis  - Replace electrolytes PRN  - Leukocytosis resolved   - Unasyn for suspected aspiration pneumonia, f/u COVID testing; appreciate ID  recs      Moncho Clemons MD  General Surgery  [facility]

## 2020-03-21 NOTE — PROGRESS NOTES
Pharmacist Renal Dose Adjustment Note    Julee Hawkins is a 74 y.o. female being treated with the medication ampicillin-sulbactam      Patient Data:    Vital Signs (Most Recent):  Temp: 97.2 °F (36.2 °C) (03/21/20 0941)  Pulse: 82 (03/21/20 0941)  Resp: 18 (03/21/20 0941)  BP: 119/61 (03/21/20 0941)  SpO2: 99 % (03/21/20 0941) Vital Signs (72h Range):  Temp:  [96.1 °F (35.6 °C)-102.6 °F (39.2 °C)]   Pulse:  [72-98]   Resp:  [15-20]   BP: ()/(50-78)   SpO2:  [90 %-100 %]      Recent Labs   Lab 03/19/20  0500 03/20/20  0400 03/21/20  0305   CREATININE 0.7 0.8 0.8     Serum creatinine: 0.8 mg/dL 03/21/20 0305  Estimated creatinine clearance: 45.1 mL/min    ampicillin-sulbactam 3 gm IV q6h will be changed to ampicillin-sulbactam 3 gm IV q8h (for crcl 30-50 ml/min)    Pharmacist's Name: Libia Ulrich  Pharmacist's Extension: [phone]

## 2020-03-21 NOTE — SUBJECTIVE & OBJECTIVE
Interval History:   Pt seen and examined at bedside  Pain well controlled. Pain same as yesterday  Voiding spontaneously  Leukocytosis resolved.    Medications:  Continuous Infusions:   sodium chloride 0.9% 75 mL/hr at 03/21/20 0646     Scheduled Meds:   ampicillin-sulbactim (UNASYN) IVPB  3 g Intravenous Q6H    enoxaparin  40 mg Subcutaneous Daily    fentaNYL  1 patch Transdermal Q72H    levothyroxine  50 mcg Oral Before breakfast    magnesium oxide  800 mg Oral Daily    pantoprazole  40 mg Oral BID AC     PRN Meds:Dextrose 10% Bolus, Dextrose 10% Bolus, diphenhydrAMINE, HYDROmorphone, influenza, levalbuterol, naloxone, ondansetron, oxyCODONE, oxyCODONE, prochlorperazine, sodium chloride 0.9%     Review of patient's allergies indicates:   Allergen Reactions    Azithromycin Other (See Comments)     Stomach pain     Codeine Hallucinations     Objective:     Vital Signs (Most Recent):  Temp: 97.8 °F (36.6 °C) (03/21/20 0332)  Pulse: 80 (03/21/20 0332)  Resp: 16 (03/21/20 0332)  BP: 109/67 (03/21/20 0332)  SpO2: 100 % (03/21/20 0332) Vital Signs (24h Range):  Temp:  [97.4 °F (36.3 °C)-99.2 °F (37.3 °C)] 97.8 °F (36.6 °C)  Pulse:  [72-84] 80  Resp:  [15-18] 16  SpO2:  [97 %-100 %] 100 %  BP: ()/(51-69) 109/67     Weight: 47 kg (103 lb 9.9 oz)  Body mass index is 18.95 kg/m².    Intake/Output - Last 3 Shifts       03/19 0700 - 03/20 0659 03/20 0700 - 03/21 0659 03/21 0700 - 03/22 0659    P.O. 1140 120     I.V. (mL/kg)       IV Piggyback 100      Total Intake(mL/kg) 1240 (26.4) 120 (2.6)     Urine (mL/kg/hr) 575 (0.5) 1500 (1.3)     Drains 175      Other  0     Stool 0 0     Total Output 750 1500     Net +490 -1380            Urine Occurrence  1 x     Stool Occurrence 0 x 0 x           Physical Exam   Constitutional: She is oriented to person, place, and time. She appears well-developed and well-nourished.   HENT:   Head: Normocephalic and atraumatic.   Cardiovascular: Normal rate and regular rhythm.    Pulmonary/Chest: Effort normal. No respiratory distress.   Abdominal: Soft. She exhibits no distension. There is no tenderness.   Midline incision c/d/i  R nephrostomy tube   Musculoskeletal: Normal range of motion. She exhibits no edema.   Neurological: She is alert and oriented to person, place, and time.   Skin: Skin is warm and dry. She is not diaphoretic.   Psychiatric: She has a normal mood and affect.   Nursing note and vitals reviewed.      Significant Labs:  Reviewed    Significant Diagnostics:  Reviewed

## 2020-03-21 NOTE — ASSESSMENT & PLAN NOTE
75 y/o female with pancreatic cancer with plans to undergo Whipple with right nephrectomy and caval reconstruction with Dr. Wall. Transferred in with pneumoperitoneum. S/p exlap with repair of duodenal perforation and peritoneal biopsies on 3/10. UGI on 3/14 demonstrating no evidence of leak.     - NPO, mIVF  - D/c MIVF when TPN hung  - Starting TPN  - Clamp G-tube  - Unclamp for nausea or vomiting not controlled by anti-emetics  - Anti-emetics PRN  - PT/OT  - Encourage OOB, ambulation   - Pulmonary hygiene - IS, ACAPELLA, deep breathing  - Mechanical and chemical DVT prophylaxis  - Replace electrolytes PRN  - Leukocytosis resolved   - Unasyn for suspected aspiration pneumonia, f/u COVID testing; appreciate ID recs

## 2020-03-21 NOTE — PROGRESS NOTES
Plan of care reviewed with pt, Kiran. No s/s of respiratory or cardiac distress. Pt remains on precautions to rule out COVID19. VS stable on RA. NPO except sips with medications maintained. No c/o nausea; G-tube remained clamped. Pt up to bedside commode with a standby assist. Nephrostomy tube draining clear output to dependent drainage. NS infusing at 75 mL/hr to right subclavian port. Right flank pain managed with heat packs and PRN oxycodone.      Pt's nephrostomy tube connector found broken this morning; leaking onto linen.    Pt free from falls and injuries, bed in low position, side rails up x2, call light within reach. Will continue to monitor.

## 2020-03-22 LAB
ALBUMIN SERPL BCP-MCNC: 2.2 G/DL (ref 3.5–5.2)
ALP SERPL-CCNC: 745 U/L (ref 55–135)
ALT SERPL W/O P-5'-P-CCNC: 18 U/L (ref 10–44)
ANION GAP SERPL CALC-SCNC: 11 MMOL/L (ref 8–16)
AST SERPL-CCNC: 22 U/L (ref 10–40)
BASOPHILS # BLD AUTO: 0.04 K/UL (ref 0–0.2)
BASOPHILS NFR BLD: 0.4 % (ref 0–1.9)
BILIRUB SERPL-MCNC: 1.2 MG/DL (ref 0.1–1)
BUN SERPL-MCNC: 7 MG/DL (ref 8–23)
CALCIUM SERPL-MCNC: 8.8 MG/DL (ref 8.7–10.5)
CHLORIDE SERPL-SCNC: 95 MMOL/L (ref 95–110)
CO2 SERPL-SCNC: 24 MMOL/L (ref 23–29)
CREAT SERPL-MCNC: 0.8 MG/DL (ref 0.5–1.4)
DIFFERENTIAL METHOD: ABNORMAL
EOSINOPHIL # BLD AUTO: 0.1 K/UL (ref 0–0.5)
EOSINOPHIL NFR BLD: 0.6 % (ref 0–8)
ERYTHROCYTE [DISTWIDTH] IN BLOOD BY AUTOMATED COUNT: 15.8 % (ref 11.5–14.5)
EST. GFR  (AFRICAN AMERICAN): >60 ML/MIN/1.73 M^2
EST. GFR  (NON AFRICAN AMERICAN): >60 ML/MIN/1.73 M^2
GLUCOSE SERPL-MCNC: 153 MG/DL (ref 70–110)
HCT VFR BLD AUTO: 27.5 % (ref 37–48.5)
HGB BLD-MCNC: 8.7 G/DL (ref 12–16)
IMM GRANULOCYTES # BLD AUTO: 0.04 K/UL (ref 0–0.04)
IMM GRANULOCYTES NFR BLD AUTO: 0.4 % (ref 0–0.5)
LYMPHOCYTES # BLD AUTO: 0.9 K/UL (ref 1–4.8)
LYMPHOCYTES NFR BLD: 9.8 % (ref 18–48)
MAGNESIUM SERPL-MCNC: 1.6 MG/DL (ref 1.6–2.6)
MCH RBC QN AUTO: 30.7 PG (ref 27–31)
MCHC RBC AUTO-ENTMCNC: 31.6 G/DL (ref 32–36)
MCV RBC AUTO: 97 FL (ref 82–98)
MONOCYTES # BLD AUTO: 0.6 K/UL (ref 0.3–1)
MONOCYTES NFR BLD: 6.3 % (ref 4–15)
NEUTROPHILS # BLD AUTO: 7.4 K/UL (ref 1.8–7.7)
NEUTROPHILS NFR BLD: 82.5 % (ref 38–73)
NRBC BLD-RTO: 0 /100 WBC
PHOSPHATE SERPL-MCNC: 2.5 MG/DL (ref 2.7–4.5)
PLATELET # BLD AUTO: 369 K/UL (ref 150–350)
PMV BLD AUTO: 9.5 FL (ref 9.2–12.9)
POTASSIUM SERPL-SCNC: 3.5 MMOL/L (ref 3.5–5.1)
PROT SERPL-MCNC: 6.4 G/DL (ref 6–8.4)
RBC # BLD AUTO: 2.83 M/UL (ref 4–5.4)
SODIUM SERPL-SCNC: 130 MMOL/L (ref 136–145)
TRIGL SERPL-MCNC: 241 MG/DL (ref 30–150)
WBC # BLD AUTO: 9 K/UL (ref 3.9–12.7)

## 2020-03-22 PROCEDURE — 94664 DEMO&/EVAL PT USE INHALER: CPT

## 2020-03-22 PROCEDURE — 25000003 PHARM REV CODE 250: Performed by: GENERAL PRACTICE

## 2020-03-22 PROCEDURE — 85025 COMPLETE CBC W/AUTO DIFF WBC: CPT

## 2020-03-22 PROCEDURE — B4185 PARENTERAL SOL 10 GM LIPIDS: HCPCS | Performed by: GENERAL PRACTICE

## 2020-03-22 PROCEDURE — 20600001 HC STEP DOWN PRIVATE ROOM

## 2020-03-22 PROCEDURE — 63600175 PHARM REV CODE 636 W HCPCS: Performed by: NURSE PRACTITIONER

## 2020-03-22 PROCEDURE — 83735 ASSAY OF MAGNESIUM: CPT

## 2020-03-22 PROCEDURE — A4217 STERILE WATER/SALINE, 500 ML: HCPCS | Performed by: GENERAL PRACTICE

## 2020-03-22 PROCEDURE — 84478 ASSAY OF TRIGLYCERIDES: CPT

## 2020-03-22 PROCEDURE — 84100 ASSAY OF PHOSPHORUS: CPT

## 2020-03-22 PROCEDURE — 99900035 HC TECH TIME PER 15 MIN (STAT)

## 2020-03-22 PROCEDURE — 63600175 PHARM REV CODE 636 W HCPCS: Performed by: STUDENT IN AN ORGANIZED HEALTH CARE EDUCATION/TRAINING PROGRAM

## 2020-03-22 PROCEDURE — 25000003 PHARM REV CODE 250: Performed by: NURSE PRACTITIONER

## 2020-03-22 PROCEDURE — 63600175 PHARM REV CODE 636 W HCPCS: Performed by: INTERNAL MEDICINE

## 2020-03-22 PROCEDURE — 63600175 PHARM REV CODE 636 W HCPCS: Performed by: GENERAL PRACTICE

## 2020-03-22 PROCEDURE — 94761 N-INVAS EAR/PLS OXIMETRY MLT: CPT

## 2020-03-22 PROCEDURE — 80053 COMPREHEN METABOLIC PANEL: CPT

## 2020-03-22 RX ADMIN — AMPICILLIN SODIUM AND SULBACTAM SODIUM 3 G: 2; 1 INJECTION, POWDER, FOR SOLUTION INTRAMUSCULAR; INTRAVENOUS at 01:03

## 2020-03-22 RX ADMIN — PANTOPRAZOLE SODIUM 40 MG: 40 TABLET, DELAYED RELEASE ORAL at 04:03

## 2020-03-22 RX ADMIN — ENOXAPARIN SODIUM 40 MG: 100 INJECTION SUBCUTANEOUS at 04:03

## 2020-03-22 RX ADMIN — PANTOPRAZOLE SODIUM 40 MG: 40 TABLET, DELAYED RELEASE ORAL at 06:03

## 2020-03-22 RX ADMIN — HYDROMORPHONE HYDROCHLORIDE 0.5 MG: 1 INJECTION, SOLUTION INTRAMUSCULAR; INTRAVENOUS; SUBCUTANEOUS at 11:03

## 2020-03-22 RX ADMIN — I.V. FAT EMULSION 250 ML: 20 EMULSION INTRAVENOUS at 12:03

## 2020-03-22 RX ADMIN — MAGNESIUM SULFATE HEPTAHYDRATE: 500 INJECTION, SOLUTION INTRAMUSCULAR; INTRAVENOUS at 11:03

## 2020-03-22 RX ADMIN — LEVOTHYROXINE SODIUM 50 MCG: 25 TABLET ORAL at 06:03

## 2020-03-22 RX ADMIN — HYDROMORPHONE HYDROCHLORIDE 0.5 MG: 1 INJECTION, SOLUTION INTRAMUSCULAR; INTRAVENOUS; SUBCUTANEOUS at 05:03

## 2020-03-22 RX ADMIN — OXYCODONE HYDROCHLORIDE 10 MG: 10 TABLET ORAL at 09:03

## 2020-03-22 RX ADMIN — OXYCODONE HYDROCHLORIDE 10 MG: 10 TABLET ORAL at 04:03

## 2020-03-22 RX ADMIN — AMPICILLIN SODIUM AND SULBACTAM SODIUM 3 G: 2; 1 INJECTION, POWDER, FOR SOLUTION INTRAMUSCULAR; INTRAVENOUS at 09:03

## 2020-03-22 RX ADMIN — HYDROMORPHONE HYDROCHLORIDE 0.5 MG: 1 INJECTION, SOLUTION INTRAMUSCULAR; INTRAVENOUS; SUBCUTANEOUS at 12:03

## 2020-03-22 RX ADMIN — MAGNESIUM SULFATE HEPTAHYDRATE: 500 INJECTION, SOLUTION INTRAMUSCULAR; INTRAVENOUS at 12:03

## 2020-03-22 RX ADMIN — HYDROMORPHONE HYDROCHLORIDE 0.5 MG: 1 INJECTION, SOLUTION INTRAMUSCULAR; INTRAVENOUS; SUBCUTANEOUS at 01:03

## 2020-03-22 NOTE — PROGRESS NOTES
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  Pt has been having abdominal pain since Saturday.  Transferred after CT findings.  Reportedly had been responding to neoadjuvant chemo based on last PET    Post-Op Info:  Procedure(s) (LRB):  LAPAROTOMY, EXPLORATORY (N/A)  CLOSURE, ULCER, PERFORATED, DUODENUM, USING OMENTAL PATCH  BIOPSY, LIVER  INSERTION, PEG TUBE   12 Days Post-Op     Interval History:   No acute events overnight. Afebrile. VSS. Laboratory studies largely unremarkable save for some electrolyte derangements. Started TPN yesterday. TG elevated today. Adequate UOP. Mild abdominal discomfort but well-controlled.      Medications:  Continuous Infusions:   TPN ADULT CENTRAL LINE CUSTOM 75 mL/hr at 03/22/20 0021    TPN ADULT CENTRAL LINE CUSTOM       Scheduled Meds:   ampicillin-sulbactim (UNASYN) IVPB  3 g Intravenous Q8H    enoxaparin  40 mg Subcutaneous Daily    fat emulsion 20%  250 mL Intravenous Daily    fentaNYL  1 patch Transdermal Q72H    levothyroxine  50 mcg Oral Before breakfast    pantoprazole  40 mg Oral BID AC     PRN Meds:Dextrose 10% Bolus, Dextrose 10% Bolus, diphenhydrAMINE, HYDROmorphone, influenza, levalbuterol, naloxone, ondansetron, oxyCODONE, oxyCODONE, prochlorperazine, sodium chloride 0.9%     Review of patient's allergies indicates:   Allergen Reactions    Azithromycin Other (See Comments)     Stomach pain     Codeine Hallucinations     Objective:     Vital Signs (Most Recent):  Temp: 97.8 °F (36.6 °C) (03/22/20 0529)  Pulse: 73 (03/22/20 0529)  Resp: 20 (03/22/20 0529)  BP: 138/70 (03/22/20 0529)  SpO2: 96 % (03/22/20 0529) Vital Signs (24h Range):  Temp:  [97.2 °F (36.2 °C)-98.5 °F (36.9 °C)] 97.8 °F  (36.6 °C)  Pulse:  [73-97] 73  Resp:  [16-20] 20  SpO2:  [95 %-99 %] 96 %  BP: (116-138)/(58-70) 138/70     Weight: 47 kg (103 lb 9.9 oz)  Body mass index is 18.95 kg/m².    Intake/Output - Last 3 Shifts       03/20 0700 - 03/21 0659 03/21 0700 - 03/22 0659 03/22 0700 - 03/23 0659    P.O. 120      IV Piggyback       Total Intake(mL/kg) 120 (2.6)      Urine (mL/kg/hr) 1925 (1.7) 2695 (2.4)     Drains       Other 0      Stool 0 0     Total Output 1925 2695     Net -1805 -2695            Urine Occurrence 1 x      Stool Occurrence 0 x 0 x           Physical Exam   Constitutional: She is oriented to person, place, and time.   Lying in hospital bed comfortably  Frail  Cachectic  No distress  No diaphoresis   HENT:   Head: Normocephalic and atraumatic.   Eyes: EOM are normal.   Neck: Normal range of motion.   Cardiovascular: Normal rate, regular rhythm and intact distal pulses.   Port-A-Cath in place to right chest   Pulmonary/Chest: Effort normal. No respiratory distress. She has no wheezes.   Abdominal:   Soft  Non-distended  Appropriate surgical site tenderness  Midline incision well-approximated without signs of surgical site infection  G tube in place to L abdomen   Musculoskeletal: She exhibits no edema or deformity.   Neurological: She is alert and oriented to person, place, and time.   Skin: Skin is warm and dry. No rash noted. No erythema.   Psychiatric: She has a normal mood and affect. Her behavior is normal.   Nursing note and vitals reviewed.      Significant Labs:  CBC:   Recent Labs   Lab 03/22/20  0421   WBC 9.00   RBC 2.83*   HGB 8.7*   HCT 27.5*   *   MCV 97   MCH 30.7   MCHC 31.6*     CMP:   Recent Labs   Lab 03/22/20  0421   *   CALCIUM 8.8   ALBUMIN 2.2*   PROT 6.4   *   K 3.5   CO2 24   CL 95   BUN 7*   CREATININE 0.8   ALKPHOS 745*   ALT 18   AST 22   BILITOT 1.2*       Significant Diagnostics:  None    Assessment/Plan:     * Pneumoperitoneum  75 y/o female with pancreatic cancer  with plans to undergo Whipple with right nephrectomy and caval reconstruction with Dr. Wall. Transferred in with pneumoperitoneum. S/p exlap with repair of duodenal perforation and peritoneal biopsies on 3/10. UGI on 3/14 demonstrating no evidence of leak.     - ID consulted, now signed off  - Continue Abx - Unasyn - end date 3/25/2020  - F/U COVID test  - Droplet precautions  - NPO  - Continue TPN  - Hold lipids today  - Clamp G-tube  - Unclamp for nausea or vomiting not controlled by anti-emetics  - Anti-emetics PRN  - PT/OT  - Encourage OOB, ambulation   - Pulmonary hygiene - IS, ACAPELLA, deep breathing  - Mechanical and chemical DVT prophylaxis  - Replace electrolytes PRN      Dejuan Foy MD  Surgery Resident, PGY-V  Pager: [phone]  3/22/2020 9:20 AM

## 2020-03-22 NOTE — SUBJECTIVE & OBJECTIVE
Interval History:   No acute events overnight. Afebrile. VSS. Laboratory studies largely unremarkable save for some electrolyte derangements. Started TPN yesterday. TG elevated today. Adequate UOP. Mild abdominal discomfort but well-controlled.      Medications:  Continuous Infusions:   TPN ADULT CENTRAL LINE CUSTOM 75 mL/hr at 03/22/20 0021    TPN ADULT CENTRAL LINE CUSTOM       Scheduled Meds:   ampicillin-sulbactim (UNASYN) IVPB  3 g Intravenous Q8H    enoxaparin  40 mg Subcutaneous Daily    fat emulsion 20%  250 mL Intravenous Daily    fentaNYL  1 patch Transdermal Q72H    levothyroxine  50 mcg Oral Before breakfast    pantoprazole  40 mg Oral BID AC     PRN Meds:Dextrose 10% Bolus, Dextrose 10% Bolus, diphenhydrAMINE, HYDROmorphone, influenza, levalbuterol, naloxone, ondansetron, oxyCODONE, oxyCODONE, prochlorperazine, sodium chloride 0.9%     Review of patient's allergies indicates:   Allergen Reactions    Azithromycin Other (See Comments)     Stomach pain     Codeine Hallucinations     Objective:     Vital Signs (Most Recent):  Temp: 97.8 °F (36.6 °C) (03/22/20 0529)  Pulse: 73 (03/22/20 0529)  Resp: 20 (03/22/20 0529)  BP: 138/70 (03/22/20 0529)  SpO2: 96 % (03/22/20 0529) Vital Signs (24h Range):  Temp:  [97.2 °F (36.2 °C)-98.5 °F (36.9 °C)] 97.8 °F (36.6 °C)  Pulse:  [73-97] 73  Resp:  [16-20] 20  SpO2:  [95 %-99 %] 96 %  BP: (116-138)/(58-70) 138/70     Weight: 47 kg (103 lb 9.9 oz)  Body mass index is 18.95 kg/m².    Intake/Output - Last 3 Shifts       03/20 0700 - 03/21 0659 03/21 0700 - 03/22 0659 03/22 0700 - 03/23 0659    P.O. 120      IV Piggyback       Total Intake(mL/kg) 120 (2.6)      Urine (mL/kg/hr) 1925 (1.7) 2695 (2.4)     Drains       Other 0      Stool 0 0     Total Output 1925 2695     Net -1805 -2695            Urine Occurrence 1 x      Stool Occurrence 0 x 0 x           Physical Exam   Constitutional: She is oriented to person, place, and time.   Lying in hospital bed  comfortably  Frail  Cachectic  No distress  No diaphoresis   HENT:   Head: Normocephalic and atraumatic.   Eyes: EOM are normal.   Neck: Normal range of motion.   Cardiovascular: Normal rate, regular rhythm and intact distal pulses.   Port-A-Cath in place to right chest   Pulmonary/Chest: Effort normal. No respiratory distress. She has no wheezes.   Abdominal:   Soft  Non-distended  Appropriate surgical site tenderness  Midline incision well-approximated without signs of surgical site infection  G tube in place to L abdomen   Musculoskeletal: She exhibits no edema or deformity.   Neurological: She is alert and oriented to person, place, and time.   Skin: Skin is warm and dry. No rash noted. No erythema.   Psychiatric: She has a normal mood and affect. Her behavior is normal.   Nursing note and vitals reviewed.      Significant Labs:  CBC:   Recent Labs   Lab 03/22/20 0421   WBC 9.00   RBC 2.83*   HGB 8.7*   HCT 27.5*   *   MCV 97   MCH 30.7   MCHC 31.6*     CMP:   Recent Labs   Lab 03/22/20 0421   *   CALCIUM 8.8   ALBUMIN 2.2*   PROT 6.4   *   K 3.5   CO2 24   CL 95   BUN 7*   CREATININE 0.8   ALKPHOS 745*   ALT 18   AST 22   BILITOT 1.2*       Significant Diagnostics:  None

## 2020-03-22 NOTE — PLAN OF CARE
Plan of care reviewed with pt, who verbalized understanding. Covid precautions remaining in place. Pt up to BSC independently for AUO, nephrostomy tube w/ clear-yellow drainage. Pt w/ c/o constant pain, around the clock PRN meds given, heat packs applied. No c/o nausea through shift. TPN running @75/hr. Call bell in reach, bed locked in lowest position. Frequent rounds made for pt safety. AAOx4, VSS, will continue to monitor.

## 2020-03-22 NOTE — ASSESSMENT & PLAN NOTE
73 y/o female with pancreatic cancer with plans to undergo Whipple with right nephrectomy and caval reconstruction with Dr. Wall. Transferred in with pneumoperitoneum. S/p exlap with repair of duodenal perforation and peritoneal biopsies on 3/10. UGI on 3/14 demonstrating no evidence of leak.     - ID consulted, now signed off  - Continue Abx - Unasyn - end date 3/25/2020  - F/U COVID test  - Droplet precautions  - NPO  - Continue TPN  - Hold lipids today  - Clamp G-tube  - Unclamp for nausea or vomiting not controlled by anti-emetics  - Anti-emetics PRN  - PT/OT  - Encourage OOB, ambulation   - Pulmonary hygiene - IS, ACAPELLA, deep breathing  - Mechanical and chemical DVT prophylaxis  - Replace electrolytes PRN

## 2020-03-22 NOTE — PLAN OF CARE
Plan of care reviewed with pt/verbalized understanding, vss, patient c/o of constant pain prn medications given, patient up to bedside commode with assistance, call-light within reach, will continue to monitor.

## 2020-03-23 LAB
ALBUMIN SERPL BCP-MCNC: 2 G/DL (ref 3.5–5.2)
ALP SERPL-CCNC: 609 U/L (ref 55–135)
ALT SERPL W/O P-5'-P-CCNC: 14 U/L (ref 10–44)
ANION GAP SERPL CALC-SCNC: 8 MMOL/L (ref 8–16)
AST SERPL-CCNC: 17 U/L (ref 10–40)
BACTERIA BLD CULT: NORMAL
BACTERIA BLD CULT: NORMAL
BASOPHILS # BLD AUTO: 0.03 K/UL (ref 0–0.2)
BASOPHILS NFR BLD: 0.5 % (ref 0–1.9)
BILIRUB SERPL-MCNC: 1 MG/DL (ref 0.1–1)
BUN SERPL-MCNC: 10 MG/DL (ref 8–23)
CALCIUM SERPL-MCNC: 8.2 MG/DL (ref 8.7–10.5)
CHLORIDE SERPL-SCNC: 95 MMOL/L (ref 95–110)
CO2 SERPL-SCNC: 27 MMOL/L (ref 23–29)
CREAT SERPL-MCNC: 0.6 MG/DL (ref 0.5–1.4)
DIFFERENTIAL METHOD: ABNORMAL
EOSINOPHIL # BLD AUTO: 0 K/UL (ref 0–0.5)
EOSINOPHIL NFR BLD: 0.3 % (ref 0–8)
ERYTHROCYTE [DISTWIDTH] IN BLOOD BY AUTOMATED COUNT: 15.4 % (ref 11.5–14.5)
EST. GFR  (AFRICAN AMERICAN): >60 ML/MIN/1.73 M^2
EST. GFR  (NON AFRICAN AMERICAN): >60 ML/MIN/1.73 M^2
GLUCOSE SERPL-MCNC: 150 MG/DL (ref 70–110)
HCT VFR BLD AUTO: 26.1 % (ref 37–48.5)
HGB BLD-MCNC: 8.3 G/DL (ref 12–16)
IMM GRANULOCYTES # BLD AUTO: 0.03 K/UL (ref 0–0.04)
IMM GRANULOCYTES NFR BLD AUTO: 0.5 % (ref 0–0.5)
LYMPHOCYTES # BLD AUTO: 0.7 K/UL (ref 1–4.8)
LYMPHOCYTES NFR BLD: 10.9 % (ref 18–48)
MAGNESIUM SERPL-MCNC: 1.9 MG/DL (ref 1.6–2.6)
MCH RBC QN AUTO: 30.2 PG (ref 27–31)
MCHC RBC AUTO-ENTMCNC: 31.8 G/DL (ref 32–36)
MCV RBC AUTO: 95 FL (ref 82–98)
MONOCYTES # BLD AUTO: 0.5 K/UL (ref 0.3–1)
MONOCYTES NFR BLD: 7.7 % (ref 4–15)
NEUTROPHILS # BLD AUTO: 5.2 K/UL (ref 1.8–7.7)
NEUTROPHILS NFR BLD: 80.1 % (ref 38–73)
NRBC BLD-RTO: 0 /100 WBC
PHOSPHATE SERPL-MCNC: 2.7 MG/DL (ref 2.7–4.5)
PLATELET # BLD AUTO: 323 K/UL (ref 150–350)
PMV BLD AUTO: 9.1 FL (ref 9.2–12.9)
POTASSIUM SERPL-SCNC: 3.2 MMOL/L (ref 3.5–5.1)
PROT SERPL-MCNC: 6 G/DL (ref 6–8.4)
RBC # BLD AUTO: 2.75 M/UL (ref 4–5.4)
SARS-COV-2 RNA RESP QL NAA+PROBE: NORMAL
SODIUM SERPL-SCNC: 130 MMOL/L (ref 136–145)
WBC # BLD AUTO: 6.51 K/UL (ref 3.9–12.7)

## 2020-03-23 PROCEDURE — 97168 OT RE-EVAL EST PLAN CARE: CPT

## 2020-03-23 PROCEDURE — 83735 ASSAY OF MAGNESIUM: CPT

## 2020-03-23 PROCEDURE — 97530 THERAPEUTIC ACTIVITIES: CPT

## 2020-03-23 PROCEDURE — 80053 COMPREHEN METABOLIC PANEL: CPT

## 2020-03-23 PROCEDURE — 25000003 PHARM REV CODE 250: Performed by: GENERAL PRACTICE

## 2020-03-23 PROCEDURE — 63600175 PHARM REV CODE 636 W HCPCS: Performed by: INTERNAL MEDICINE

## 2020-03-23 PROCEDURE — 85025 COMPLETE CBC W/AUTO DIFF WBC: CPT

## 2020-03-23 PROCEDURE — 94761 N-INVAS EAR/PLS OXIMETRY MLT: CPT

## 2020-03-23 PROCEDURE — 63600175 PHARM REV CODE 636 W HCPCS: Performed by: STUDENT IN AN ORGANIZED HEALTH CARE EDUCATION/TRAINING PROGRAM

## 2020-03-23 PROCEDURE — U0002 COVID-19 LAB TEST NON-CDC: HCPCS

## 2020-03-23 PROCEDURE — 99900035 HC TECH TIME PER 15 MIN (STAT)

## 2020-03-23 PROCEDURE — 25000003 PHARM REV CODE 250: Performed by: NURSE PRACTITIONER

## 2020-03-23 PROCEDURE — 97535 SELF CARE MNGMENT TRAINING: CPT

## 2020-03-23 PROCEDURE — 92610 EVALUATE SWALLOWING FUNCTION: CPT

## 2020-03-23 PROCEDURE — 25000003 PHARM REV CODE 250: Performed by: STUDENT IN AN ORGANIZED HEALTH CARE EDUCATION/TRAINING PROGRAM

## 2020-03-23 PROCEDURE — 63600175 PHARM REV CODE 636 W HCPCS: Performed by: NURSE PRACTITIONER

## 2020-03-23 PROCEDURE — 97164 PT RE-EVAL EST PLAN CARE: CPT

## 2020-03-23 PROCEDURE — 84100 ASSAY OF PHOSPHORUS: CPT

## 2020-03-23 PROCEDURE — 94664 DEMO&/EVAL PT USE INHALER: CPT

## 2020-03-23 PROCEDURE — 20600001 HC STEP DOWN PRIVATE ROOM

## 2020-03-23 PROCEDURE — A4217 STERILE WATER/SALINE, 500 ML: HCPCS | Performed by: STUDENT IN AN ORGANIZED HEALTH CARE EDUCATION/TRAINING PROGRAM

## 2020-03-23 RX ORDER — HYDROMORPHONE HYDROCHLORIDE 1 MG/ML
0.5 INJECTION, SOLUTION INTRAMUSCULAR; INTRAVENOUS; SUBCUTANEOUS EVERY 6 HOURS PRN
Status: DISCONTINUED | OUTPATIENT
Start: 2020-03-24 | End: 2020-03-26 | Stop reason: HOSPADM

## 2020-03-23 RX ORDER — MAGNESIUM SULFATE HEPTAHYDRATE 40 MG/ML
2 INJECTION, SOLUTION INTRAVENOUS ONCE
Status: COMPLETED | OUTPATIENT
Start: 2020-03-23 | End: 2020-03-23

## 2020-03-23 RX ORDER — POTASSIUM CHLORIDE 7.45 MG/ML
10 INJECTION INTRAVENOUS
Status: COMPLETED | OUTPATIENT
Start: 2020-03-23 | End: 2020-03-23

## 2020-03-23 RX ADMIN — POTASSIUM CHLORIDE 10 MEQ: 7.46 INJECTION, SOLUTION INTRAVENOUS at 03:03

## 2020-03-23 RX ADMIN — OXYCODONE HYDROCHLORIDE 10 MG: 10 TABLET ORAL at 12:03

## 2020-03-23 RX ADMIN — LEVOTHYROXINE SODIUM 50 MCG: 25 TABLET ORAL at 06:03

## 2020-03-23 RX ADMIN — PANTOPRAZOLE SODIUM 40 MG: 40 TABLET, DELAYED RELEASE ORAL at 05:03

## 2020-03-23 RX ADMIN — POTASSIUM CHLORIDE 10 MEQ: 7.46 INJECTION, SOLUTION INTRAVENOUS at 02:03

## 2020-03-23 RX ADMIN — ENOXAPARIN SODIUM 40 MG: 100 INJECTION SUBCUTANEOUS at 05:03

## 2020-03-23 RX ADMIN — HYDROMORPHONE HYDROCHLORIDE 0.5 MG: 1 INJECTION, SOLUTION INTRAMUSCULAR; INTRAVENOUS; SUBCUTANEOUS at 09:03

## 2020-03-23 RX ADMIN — MAGNESIUM SULFATE HEPTAHYDRATE 2 G: 40 INJECTION, SOLUTION INTRAVENOUS at 09:03

## 2020-03-23 RX ADMIN — HYDROMORPHONE HYDROCHLORIDE 0.5 MG: 1 INJECTION, SOLUTION INTRAMUSCULAR; INTRAVENOUS; SUBCUTANEOUS at 02:03

## 2020-03-23 RX ADMIN — POTASSIUM CHLORIDE 10 MEQ: 7.46 INJECTION, SOLUTION INTRAVENOUS at 09:03

## 2020-03-23 RX ADMIN — OXYCODONE HYDROCHLORIDE 10 MG: 10 TABLET ORAL at 10:03

## 2020-03-23 RX ADMIN — FENTANYL 1 PATCH: 12 PATCH TRANSDERMAL at 05:03

## 2020-03-23 RX ADMIN — OXYCODONE HYDROCHLORIDE 10 MG: 10 TABLET ORAL at 05:03

## 2020-03-23 RX ADMIN — MAGNESIUM SULFATE HEPTAHYDRATE: 500 INJECTION, SOLUTION INTRAMUSCULAR; INTRAVENOUS at 10:03

## 2020-03-23 RX ADMIN — AMPICILLIN SODIUM AND SULBACTAM SODIUM 3 G: 2; 1 INJECTION, POWDER, FOR SOLUTION INTRAMUSCULAR; INTRAVENOUS at 06:03

## 2020-03-23 RX ADMIN — OXYCODONE HYDROCHLORIDE 10 MG: 10 TABLET ORAL at 04:03

## 2020-03-23 RX ADMIN — AMPICILLIN SODIUM AND SULBACTAM SODIUM 3 G: 2; 1 INJECTION, POWDER, FOR SOLUTION INTRAMUSCULAR; INTRAVENOUS at 10:03

## 2020-03-23 RX ADMIN — POTASSIUM CHLORIDE 10 MEQ: 7.46 INJECTION, SOLUTION INTRAVENOUS at 11:03

## 2020-03-23 RX ADMIN — PANTOPRAZOLE SODIUM 40 MG: 40 TABLET, DELAYED RELEASE ORAL at 06:03

## 2020-03-23 RX ADMIN — HYDROMORPHONE HYDROCHLORIDE 0.5 MG: 1 INJECTION, SOLUTION INTRAMUSCULAR; INTRAVENOUS; SUBCUTANEOUS at 08:03

## 2020-03-23 RX ADMIN — POTASSIUM CHLORIDE 10 MEQ: 7.46 INJECTION, SOLUTION INTRAVENOUS at 12:03

## 2020-03-23 RX ADMIN — AMPICILLIN SODIUM AND SULBACTAM SODIUM 3 G: 2; 1 INJECTION, POWDER, FOR SOLUTION INTRAMUSCULAR; INTRAVENOUS at 02:03

## 2020-03-23 NOTE — PLAN OF CARE
Plan of care reviewed with pt/verbalized understanding, vss, patient remains NPO, c/o of constant pain relieved some with prn pain medications, denies nausea, patient up to bedside-commode with assistance, call-light within reach, will continue to monitor.

## 2020-03-23 NOTE — PLAN OF CARE
Problem: SLP Goal  Goal: SLP Goal  Description  Speech Language Pathology Goals  Goals expected to be met by 3/30/2020  1.  Pt will tolerate a Level V mechanical soft diet with thin liquids w/o overt S/S aspiration, MIN A  2. Pt will recall at least three safe swallow strategies I'ly,   3. Educate Pt and caregivers on S/S aspiration and aspiration precautions        Outcome: Ongoing, Progressing     SLP Bedside Swallow Study completed. No overt S/S aspiration with trials presented. SLP cannot r/o silent aspiration at the bedside.  Pt with c/o of intense R abdominal pain t/o assessment, RN aware and in room to provide medications.  From an oropharyngeal perspective, Pt deemed ok to resume Level V mechanical soft diet with thin liquids, medications crushed in puree, provided use of small bites/sips, slow pace to bites/sips, and strict aspiration precautions with assistance with all PO once cleared by MD to resume PO; however risk of aspiration remains due to reduced endurance, fatigue and pain.  ST to continue to follow.     ALICIA Yao., [facility]-SL[facility]  Speech-Language Pathology  Pager: [phone]  3/23/2020

## 2020-03-23 NOTE — PLAN OF CARE
POC reviewed with pt. Pt neuro intact, VSS on RA. ML abd incision intact with staples and open to air, G tube and R nephrostomy tube intact. G tube unclamped q 6 h per order, output approx. 300 x2. R port accessed with TPN @ 75 infusing, 22 L arm patent. Pt diet advanced from NPO to dysphagia mechanical soft level 5 for supper. Pt ate 50%. LBM 3/21, up to BSCC with 1 person assist. Pt received PRN dilaudid and oxycodone for abdominal pain. Potassium 3.2 from AM labs. Pt received 5 K riders and tolerated well. Pt COVID-19 test resulted negative, precautions removed. No skin breakdown noted. Bilateral heels boggy, elevated with pillows. Sacral mepilex placed for prevention only. Bed in lowest position, call light within reach.

## 2020-03-23 NOTE — PROGRESS NOTES
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  Pt has been having abdominal pain since Saturday.  Transferred after CT findings.  Reportedly had been responding to neoadjuvant chemo based on last PET    Post-Op Info:  Procedure(s) (LRB):  LAPAROTOMY, EXPLORATORY (N/A)  CLOSURE, ULCER, PERFORATED, DUODENUM, USING OMENTAL PATCH  BIOPSY, LIVER  INSERTION, PEG TUBE   13 Days Post-Op     Interval History:   No acute events overnight. Afebrile. VSS. Laboratory studies largely unremarkable save for some electrolyte derangements. TPN was reordered, Adequate UOP. Mild abdominal discomfort but well-controlled.      Medications:  Continuous Infusions:   TPN ADULT CENTRAL LINE CUSTOM 75 mL/hr at 03/22/20 2333    TPN ADULT CENTRAL LINE CUSTOM       Scheduled Meds:   ampicillin-sulbactim (UNASYN) IVPB  3 g Intravenous Q8H    enoxaparin  40 mg Subcutaneous Daily    fentaNYL  1 patch Transdermal Q72H    levothyroxine  50 mcg Oral Before breakfast    pantoprazole  40 mg Oral BID AC     PRN Meds:Dextrose 10% Bolus, Dextrose 10% Bolus, diphenhydrAMINE, HYDROmorphone, influenza, levalbuterol, naloxone, ondansetron, oxyCODONE, oxyCODONE, prochlorperazine, sodium chloride 0.9%     Review of patient's allergies indicates:   Allergen Reactions    Azithromycin Other (See Comments)     Stomach pain     Codeine Hallucinations     Objective:     Vital Signs (Most Recent):  Temp: 98.1 °F (36.7 °C) (03/22/20 1952)  Pulse: 73 (03/23/20 0500)  Resp: 17 (03/23/20 0500)  BP: 119/67 (03/23/20 0500)  SpO2: 99 % (03/23/20 0500) Vital Signs (24h Range):  Temp:  [97.6 °F (36.4 °C)-98.4 °F (36.9 °C)] 98.1 °F (36.7 °C)  Pulse:  [72-88] 73  Resp:  [16-20] 17  SpO2:  [96 %-99 %] 99  %  BP: (113-127)/(60-74) 119/67     Weight: 47 kg (103 lb 9.9 oz)  Body mass index is 18.95 kg/m².    Intake/Output - Last 3 Shifts       03/21 0700 - 03/22 0659 03/22 0700 - 03/23 0659 03/23 0700 - 03/24 0659    P.O.  240     Total Intake(mL/kg)  240 (5.1)     Urine (mL/kg/hr) 2695 (2.4) 2500 (2.2)     Emesis/NG output  0     Other       Stool 0 0     Blood  0     Total Output 2695 2500     Net -2695 -2260            Stool Occurrence 0 x 0 x           Physical Exam   Constitutional: She is oriented to person, place, and time.   Lying in hospital bed comfortably  Frail  Cachectic  No distress  No diaphoresis   HENT:   Head: Normocephalic and atraumatic.   Eyes: EOM are normal.   Neck: Normal range of motion.   Cardiovascular: Normal rate, regular rhythm and intact distal pulses.   Port-A-Cath in place to right chest   Pulmonary/Chest: Effort normal. No respiratory distress. She has no wheezes.   Abdominal:   Soft  Non-distended  Appropriate surgical site tenderness  Midline incision well-approximated without signs of surgical site infection  G tube in place to L abdomen   Musculoskeletal: She exhibits no edema or deformity.   Neurological: She is alert and oriented to person, place, and time.   Skin: Skin is warm and dry. No rash noted. No erythema.   Psychiatric: She has a normal mood and affect. Her behavior is normal.   Nursing note and vitals reviewed.      Significant Labs:  CBC:   Recent Labs   Lab 03/23/20  0338   WBC 6.51   RBC 2.75*   HGB 8.3*   HCT 26.1*      MCV 95   MCH 30.2   MCHC 31.8*     CMP:   Recent Labs   Lab 03/23/20  0338   *   CALCIUM 8.2*   ALBUMIN 2.0*   PROT 6.0   *   K 3.2*   CO2 27   CL 95   BUN 10   CREATININE 0.6   ALKPHOS 609*   ALT 14   AST 17   BILITOT 1.0       Significant Diagnostics:  None    Assessment/Plan:     * Pneumoperitoneum  75 y/o female with pancreatic cancer with plans to undergo Whipple with right nephrectomy and caval reconstruction with Dr. Wall.  Transferred in with pneumoperitoneum. S/p exlap with repair of duodenal perforation and peritoneal biopsies on 3/10. UGI on 3/14 demonstrating no evidence of leak.     - ID consulted, now signed off  - Continue Abx - Unasyn - end date 3/25/2020  - F/U COVID test  - Droplet precautions  - NPO  - Continue TPN  - Hold lipids today  - Clamp G-tube  - Unclamp for nausea or vomiting not controlled by anti-emetics  - Anti-emetics PRN  - PT/OT  - Encourage OOB, ambulation   - Pulmonary hygiene - IS, ACAPELLA, deep breathing  - Mechanical and chemical DVT prophylaxis  - Replace electrolytes PRN        Gunner Villalta MD  General Surgery  [facility]

## 2020-03-23 NOTE — PT/OT/SLP EVAL
Speech Language Pathology Evaluation  Bedside Swallow    Patient Name:  Julee Hawkins   MRN:  [id number]  Admitting Diagnosis: Pneumoperitoneum    Recommendations:                 General Recommendations:  Dysphagia therapy and Pt/family education  Diet recommendations:  Mechanical soft(PER MD clearance to resume PO ), Thin   Per IDDSI Level V Guidelines, all mechanical soft items must be minced and moist. Please avoid mixed consistencies (such as cereals, soups with large pieces of meat/vegetable, etc.), avoid dry/coarse/crumbly items such as rice ,nuts, seeds, dried fruit, coconut, avoid fibrous foods, avoid tough skins, avoid tough vegetables (i.e. no corn, brussel sprouts,etc) avoid chewy/sticky foods (i.e. no peanut putter, caramel, licorice, etc.)    Aspiration Precautions:  - assistance with all PO advised for safety  - small bites/sips. Do not overeat.   - Only when awake and alert  - smaller, more frequent meals t/o the day encouraged   - all PO intake at 90 degree angle  - remain upright 60 minutes+ following all PO intake  - remain elevated at 30 degree angle or higher when sleeping  - avoid exercise on empty stomach  - Continue to monitor for signs and symptoms of aspiration and discontinue oral feeding should you notice any of the following: watery eyes, reddened facial area, wet vocal quality, increased work of breathing, change in respiratory status, increased congestion, coughing, fever, etc.    General Precautions: Standard, aspiration, fall, airborne, droplet, contact  Communication strategies:  provide increased time to answer and go to room if call light pushed    History:     Past Medical History:   Diagnosis Date    Arthritis     Biliary stricture     Cancer     Breast cancer    Cholangitis     Dilated bile duct     Duodenal stenosis     Jaundice     Pancreas cyst     Pancreatic mass     Protein calorie malnutrition     Thyroid disease     Ureteral stricture, right     Vitamin  D deficiency     Weight loss, unintentional        Past Surgical History:   Procedure Laterality Date    ANTEGRADE NEPHROSTOGRAPHY Right 2/13/2020    Procedure: Nephrostogram - antegrade;  Surgeon: Moncho Rowell MD;  Location: [facility] OR 1ST FLR;  Service: Urology;  Laterality: Right;    APPENDECTOMY      BACK SURGERY      BREAST SURGERY      lumpectomy    CHOLECYSTECTOMY      CLOSURE OF PERFORATED ULCER OF DUODENUM USING OMENTAL PATCH  3/10/2020    Procedure: CLOSURE, ULCER, PERFORATED, DUODENUM, USING OMENTAL PATCH;  Surgeon: Quang Wall MD;  Location: [facility] OR 2ND FLR;  Service: General;;    CYSTOSCOPY W/ RETROGRADES Right 2/13/2020    Procedure: CYSTOSCOPY, WITH RETROGRADE PYELOGRAM;  Surgeon: Moncho Rowell MD;  [facility];  Service: Urology;  Laterality: Right;  1hr    ENDOSCOPIC ULTRASOUND OF UPPER GASTROINTESTINAL TRACT N/A 6/13/2019    Procedure: ULTRASOUND, UPPER GI TRACT, ENDOSCOPIC;  Surgeon: Jhonathan Abdul MD;  Location: [facility] ENDO (2ND FLR);  Service: Endoscopy;  Laterality: N/A;    ERCP N/A 6/13/2019    Procedure: ERCP (ENDOSCOPIC RETROGRADE CHOLANGIOPANCREATOGRAPHY);  Surgeon: Jhonathan Abdul MD;  Location: [facility] ENDO (2ND FLR);  Service: Endoscopy;  Laterality: N/A;    ERCP N/A 2/14/2020    Procedure: ERCP (ENDOSCOPIC RETROGRADE CHOLANGIOPANCREATOGRAPHY);  Surgeon: Coleman Merritt MD;  Location: [facility] ENDO (2ND FLR);  Service: Endoscopy;  Laterality: N/A;    HYSTERECTOMY      LIVER BIOPSY  3/10/2020    Procedure: BIOPSY, LIVER;  Surgeon: Quang Wall MD;  Location: [facility] OR 2ND FLR;  Service: General;;    rectovaginal fistula repair      before 2010, from BRCA chemotherapy    SBR Right 10/09/2019    with open repair of R femoral hernia     SPINE SURGERY      URETEROSCOPY Right 2/13/2020    Procedure: URETEROSCOPY;  Surgeon: Moncho Rowell MD;  Location: [facility] OR 1ST FLR;  Service: Urology;  Laterality: Right;       Social History:  "Patient lives with Spouse in home in [place], [place].    Prior Intubation HX:  3/10/2020    Modified Barium Swallow: none prior at this facility    Chest X-Rays: 3/19/2020: New patchy right mid and lower lung zone opacities which may relate to infection, aspiration, or asymmetric edema.  Interval improved aeration of the left lung base.    Prior diet: Pt currently NPO on TPN, explained she was most recently on full liquids per MD order    Subjective     SLP reviewed Pt with RN and MD team, both cleared for therapy. MD ([facility]) confirmed no restrictions for PO trials   Patient presents in pain  She explains,"I have a hard time with those tylenol tablets"      Pain/Comfort:  · Pain Rating 1: 7/10  · Location - Side 1: Right  · Location - Orientation 1: upper  · Location 1: abdomen  · Pain Addressed 1: Pre-medicate for activity, Nurse notified, Other (see comments), Cessation of Activity(RN In room to provide medications )  · Pain Rating Post-Intervention 1: 7/10    Objective:     Oral Musculature Evaluation  · Oral Musculature: general weakness  · Dentition: present and adequate  · Secretion Management: adequate  · Mucosal Quality: adequate  · Mandibular Strength and Mobility: WNL  · Oral Labial Strength and Mobility: WNL  · Lingual Strength and Mobility: WNL  · Volitional Cough: present   · Volitional Swallow: elicited, timely   · Voice Prior to PO Intake: clear, mildly decreased intensity     Bedside Swallow Eval:   Consistencies Assessed:  · Thin liquids : cup edge sips x3, straw sips x3  · Puree : tsp bites x4  · Solids : bites of clyde cracker x4     Oral Phase:   · WNL    Pharyngeal Phase:   · no overt clinical signs/symptoms of aspiration    Compensatory Strategies  · slow pace, small bites, no talking while chewing    Treatment: Pt found asleep in bed. RN in room to provide medications 2/2 c/o abdominal pain. SLP assisted Pt up on edge of bed per Pt request for PO trials. No overt S/S aspiration with PO trials of " thin liquids, puree or solids. SLP unable to r/o silent aspiration at the bedside.  Pt did endorsed continued pain with trials. Pt was educated on  SLP's role,  diet recommendations, aspiration precautions, ongoing monitoring for S/S aspiration and SLP POC. She was further educated on option to continue textures most comfortably including clears or full liquids, and option to have smaller snacks t/o day to maximize endurance. Pt verbalized understanding of education. No additional questions noted. Whiteboard updated. Pt repositioned back in bed with call light and phone in reach prior to SLP exit from room. Findings/recs reviewed with MD team following session. No questions noted.     Assessment:     Julee Hawkins is a 74 y.o. female with an SLP diagnosis of risk of aspiration.  She presents with decreased endurance 2/2 pain and fatigue.  ST to continue to follow. Please continue to monitor for signs and symptoms of aspiration and discontinue oral feeding should you notice any of the following: watery eyes, reddened facial area, wet vocal quality, increased work of breathing, change in respiratory status, increased congestion, coughing, fever, etc.      Goals:   Multidisciplinary Problems     SLP Goals        Problem: SLP Goal    Goal Priority Disciplines Outcome   SLP Goal     SLP Ongoing, Progressing   Description:  Speech Language Pathology Goals  Goals expected to be met by 3/30/2020  1.  Pt will tolerate a Level V mechanical soft diet with thin liquids w/o overt S/S aspiration, MIN A  2. Pt will recall at least three safe swallow strategies I'ly,   3. Educate Pt and caregivers on S/S aspiration and aspiration precautions                         Plan:     · Patient to be seen:  4 x/week   · Plan of Care expires:  04/22/20  · Plan of Care reviewed with:  patient   · SLP Follow-Up:  Yes       Discharge recommendations:  other (see comments)(per PT/OT recs and Pt progress)       Time Tracking:     SLP  Treatment Date:   03/23/20  Speech Start Time:  1402  Speech Stop Time:  1437     Speech Total Time (min):  35 min    Billable Minutes: Eval Swallow and Oral Function 20 and Seld Care/Home Management Training 15    SHELLIE Yao, [facility]-SLP  Speech-Language Pathology  Pager: [phone]    03/23/2020

## 2020-03-23 NOTE — PT/OT/SLP RE-EVAL
"Occupational Therapy   Re-evaluation    Name: Julee Hawkins  MRN: [id number]  Admitting Diagnosis:  Pneumoperitoneum 13 Days Post-Op    Recommendations:     Discharge Recommendations: nursing facility, skilled  Discharge Equipment Recommendations:  bedside commode, walker, rolling, bath bench  Barriers to discharge:  None    Assessment:     Julee Hawkins is a 74 y.o. female with a medical diagnosis of Pneumoperitoneum.  She presents with 8.5/10 pain. Pt cleared by MD to resume OT as pt is awaiting COVID results. Pt does demonstrate self-limiting behaviors and educated on importance of OOB mobility and safety. She required increased time to complete tasks 2* pain. Performance deficits affecting function are weakness, impaired endurance, impaired self care skills, impaired functional mobilty, gait instability, pain, impaired cardiopulmonary response to activity, impaired balance.  Pt would benefit from skilled OT services in order to maximize independence with ADLs and facilitate safe discharge.     Rehab Prognosis:  Fair; patient would benefit from acute skilled OT services to address these deficits and reach maximum level of function.       Plan:     Patient to be seen 5 x/week to address the above listed problems via self-care/home management, therapeutic exercises, therapeutic activities  · Plan of Care Expires: 04/11/20  · Plan of Care Reviewed with: patient    Subjective     Chief Complaint: pt c/o of pain, "I don't want to sit in the chair"  Patient/Family stated goals: to get better and go home  Communicated with: RN and PT prior to session.  Pain/Comfort:  · Pain Rating 1: 9/10("8.5/10")  · Location - Orientation 1: generalized  · Location 1: abdomen  · Pain Addressed 1: Nurse notified, Distraction, Reposition    Objective:     Communicated with: RN and PT prior to session.  Patient found HOB elevated with: telemetry, PICC line, peripheral IV upon OT entry to room. [id number] with " PT.    General Precautions: Standard, fall, contact, airborne, droplet(COVID precautions)   Orthopedic Precautions:N/A   Braces: N/A     Occupational Performance:    Bed Mobility:    · Patient completed Rolling/Turning to Left with  contact guard assistance  · Patient completed Supine to Sit with contact guard assistance  · Patient completed Sit to Supine with minimum assistance BLE management    Functional Mobility/Transfers:  · Patient completed Sit <> Stand Transfer with contact guard assistance  with  hand-held assist   · Patient completed Toilet Transfer Step Transfer technique with contact guard assistance with  bedside commode  · Functional Mobility: Pt ambulated ~4 steps from bed to [facility] with CGA and HHA.  · Pt declined to ambulate to restroom to perform toileting from standard toilet  · Pt declined to sit [facility] following toileting from [facility] [place] [facility]    Activities of Daily Living:  · Toileting: stand by assistance mimi-care and clothing management from [facility] [place] [facility]    Cognitive/Visual Perceptual:  Cognitive/Psychosocial Skills:     -       Oriented to: Person, Place, Time and Situation   -       Follows Commands/attention:Follows one-step commands  -       Communication: clear/fluent  -       Memory: No Deficits noted  -       Safety awareness/insight to disability: intact   -       Mood/Affect/Coping skills/emotional control: Flat affect and Guarded    Physical Exam:  Upper Extremity Range of Motion:     -       Right Upper Extremity: WFL  -       Left Upper Extremity: WFL  Upper Extremity Strength:    -       Right Upper Extremity: WFL  -       Left Upper Extremity: WFL   Strength:    -       Right Upper Extremity: WFL  -       Left Upper Extremity: WFL  Fine Motor Coordination:    -       Intact    AMPAC 6 Click:  AMPAC Total Score: 17    Treatment & Education:  -Therapist provided facilitation and instruction of proper body mechanics, energy conservation, and fall prevention strategies during tasks listed above.  -Pt  declined to ambulate to rest room to perform toileting and declined to sit OOB  -Pt required increased time to complete tasks 2* pain  -Pt educated on role of OT, POC and goals for therapy  -Pt educated on importance of OOB activities with staff member assistance and sitting OOB majority of the day.   -Pt verbalized understanding. Pt expressed no further concerns/questions  -Whiteboard updated      Patient left HOB elevated with all lines intact, call button in reach and RN notified    GOALS:   Multidisciplinary Problems     Occupational Therapy Goals        Problem: Occupational Therapy Goal    Goal Priority Disciplines Outcome Interventions   Occupational Therapy Goal     OT, PT/OT Ongoing, Progressing    Description:  Goals to be met by: 4/3/2020     Patient will increase functional independence with ADLs by performing:    UE Dressing with Stand-by Assistance.  LE Dressing with Stand-by Assistance.  Grooming while standing at sink with Stand-by Assistance.  Toileting from toilet with Stand-by Assistance for hygiene and clothing management.   Upper extremity exercise program x10 reps per handout, with supervision.  Pt will perform functional mobility task of household and community distance with SBA using AD as needed.                     History:     Past Medical History:   Diagnosis Date    Arthritis     Biliary stricture     Cancer     Breast cancer    Cholangitis     Dilated bile duct     Duodenal stenosis     Jaundice     Pancreas cyst     Pancreatic mass     Protein calorie malnutrition     Thyroid disease     Ureteral stricture, right     Vitamin D deficiency     Weight loss, unintentional        Past Surgical History:   Procedure Laterality Date    ANTEGRADE NEPHROSTOGRAPHY Right 2/13/2020    Procedure: Nephrostogram - antegrade;  Surgeon: Moncho Rowell MD;  Location: [facility] OR [address] [place] [address];  Service: Urology;  Laterality: Right;    APPENDECTOMY      BACK SURGERY      BREAST SURGERY       lumpectomy    CHOLECYSTECTOMY      CLOSURE OF PERFORATED ULCER OF DUODENUM USING OMENTAL PATCH  3/10/2020    Procedure: CLOSURE, ULCER, PERFORATED, DUODENUM, USING OMENTAL PATCH;  Surgeon: Quang Wall MD;  Location: [facility] OR 2ND FLR;  Service: General;;    CYSTOSCOPY W/ RETROGRADES Right 2/13/2020    Procedure: CYSTOSCOPY, WITH RETROGRADE PYELOGRAM;  Surgeon: Moncho Rowell MD;  Location: [facility] OR 1ST FLR;  Service: Urology;  Laterality: Right;  1hr    ENDOSCOPIC ULTRASOUND OF UPPER GASTROINTESTINAL TRACT N/A 6/13/2019    Procedure: ULTRASOUND, UPPER GI TRACT, ENDOSCOPIC;  Surgeon: Jhonathan Abdul MD;  Location: [facility] ENDO (2ND FLR);  Service: Endoscopy;  Laterality: N/A;    ERCP N/A 6/13/2019    Procedure: ERCP (ENDOSCOPIC RETROGRADE CHOLANGIOPANCREATOGRAPHY);  Surgeon: Jhonathan Abdul MD;  Location: [facility] ENDO (2ND FLR);  Service: Endoscopy;  Laterality: N/A;    ERCP N/A 2/14/2020    Procedure: ERCP (ENDOSCOPIC RETROGRADE CHOLANGIOPANCREATOGRAPHY);  Surgeon: Coleman Merritt MD;  Location: [facility] ENDO (2ND FLR);  Service: Endoscopy;  Laterality: N/A;    HYSTERECTOMY      LIVER BIOPSY  3/10/2020    Procedure: BIOPSY, LIVER;  Surgeon: Quang Wall MD;  Location: [facility] OR 2ND FLR;  Service: General;;    rectovaginal fistula repair      before 2010, from BRCA chemotherapy    SBR Right 10/09/2019    with open repair of R femoral hernia     SPINE SURGERY      URETEROSCOPY Right 2/13/2020    Procedure: URETEROSCOPY;  Surgeon: Moncho Rowell MD;  Location: [facility] OR 1ST FLR;  Service: Urology;  Laterality: Right;       Time Tracking:     OT Date of Treatment: 03/23/20  OT Start Time: 0934  OT Stop Time: 1012  OT Total Time (min): 38 min    Billable Minutes:Re-eval 15  Self Care/Home Management 23    Bess Dares, OT  3/23/2020

## 2020-03-23 NOTE — PLAN OF CARE
Problem: Occupational Therapy Goal  Goal: Occupational Therapy Goal  Description  Goals to be met by: 4/3/2020     Patient will increase functional independence with ADLs by performing:    UE Dressing with Stand-by Assistance.  LE Dressing with Stand-by Assistance.  Grooming while standing at sink with Stand-by Assistance.  Toileting from toilet with Stand-by Assistance for hygiene and clothing management.   Upper extremity exercise program x10 reps per handout, with supervision.  Pt will perform functional mobility task of household and community distance with SBA using AD as needed.    Outcome: Ongoing, Progressing     Re-evaluation complete-see note for details. Goals reviewed and remain appropriate.    TOM Gauthier/L  3/23/2020   Pager #: [phone]

## 2020-03-23 NOTE — PLAN OF CARE
Patient is now NPO with G tube open for N&V, on TPN.  Patient was expected to discharge to [facility], but, TPN still ordered for patient.  Patient is expected to be taken off TPN prior to discharge, when medically stable.  Will continue to follow for needs.       03/23/20 1349   Discharge Reassessment   Assessment Type Discharge Planning Reassessment   Discharge Plan A Skilled Nursing Facility   Discharge Plan B Long-term acute care facility (LTAC)   Post-Acute Status   Post-Acute Authorization Placement   Discharge Delays (!) Change in Medical Condition

## 2020-03-23 NOTE — PT/OT/SLP RE-EVAL
Physical Therapy Re-evaluation    Patient Name:  Julee Hawkins   MRN:  [id number]    Recommendations:     Discharge Recommendations:  nursing facility, skilled   Discharge Equipment Recommendations: bedside commode, bath bench, walker, rolling   Barriers to discharge: decreased functional mobility    Assessment:     Julee Hawkins is a 74 y.o. female admitted with a medical diagnosis of Pneumoperitoneum.  She presents with the following impairments/functional limitations:  weakness, impaired balance, impaired functional mobilty, impaired endurance, impaired self care skills, gait instability, pain, impaired cardiopulmonary response to activity.  Re-eval performed as pt not seen by therapy for extended time while awaiting COVID testing.  Tolerated session c c/o abdominal pain and fatigue.  Pt requiring mod encouragement for participation and performed mobility c CGA.  Pt able to amb short distance c BSC on this date c HHAx1 and demo decreased gait speed, shuffled gait pattern, FFP and mild unsteadiness c no overt LOB.  Pt safe to amb c assistance of 1x person. Pt would benefit from continued skilled acute PT 4x/wk to improve functional mobility.      Rehab Prognosis:  good; patient would benefit from acute skilled PT services to address these deficits and reach maximum level of function.      Recent Surgery: Procedure(s) (LRB):  LAPAROTOMY, EXPLORATORY (N/A)  CLOSURE, ULCER, PERFORATED, DUODENUM, USING OMENTAL PATCH  BIOPSY, LIVER  INSERTION, PEG TUBE 13 Days Post-Op    Plan:     During this hospitalization, patient to be seen 4 x/week to address the above listed problems via gait training, therapeutic activities, therapeutic exercises  · Plan of Care Expires:  04/09/20   Plan of Care Reviewed with: patient    Subjective     Communicated with RN and OT prior to session.  Patient found left sidelying with peripheral IV, telemetry, PICC line upon PT entry to room, agreeable to evaluation.      Chief  Complaint: fatigue and pain  Patient comments/goals: to return home  Pain/Comfort:  · Pain Rating 1: (reports abdominal pain)    Patients cultural, spiritual, Taoism conflicts given the current situation: no      Objective:     Patient found with: peripheral IV, telemetry, PICC line     General Precautions: Standard, fall, contact, airborne, droplet   Orthopedic Precautions:N/A   Braces: N/A     Exams:  · Cognitive Exam:  Patient is oriented to Person, Place, Time and Situation  · RLE ROM: WFL  · RLE Strength: grossly 4/5  · LLE ROM: WFL  · LLE Strength: grossly 4/5    Functional Mobility:  · Bed Mobility:     · Rolling Left:  contact guard assistance  · Scooting: contact guard assistance  · Supine to Sit: contact guard assistance  · Sit to Supine: contact guard assistance  · Transfers:     · Sit to Stand:  contact guard assistance with no AD  · Bed to Chair: contact guard assistance with  hand-held assist  using  Step Transfer  · Gait: 5ft x2 c HHAx1 CGA   · demo decreased gait speed, shuffled gait pattern, FFP and mild unsteadiness c no overt LOB  · Balance: sitting (S); standing (CGA)    AM-PAC 6 CLICK MOBILITY  Total Score:18       Therapeutic Activities and Exercises:  Pt educated on: PT role/POC; safety c mobility; benefits of OOB activities; performing therex; d/c recs - v/u  -sat EOB x6mins  -sit<>stand from bed 4x, BSC 2x  -therex (LAQ, hip flex, AP)  -standing x4mins  -assisted to BSC for voiding  -repositioned for bed    Patient left HOB elevated with all lines intact, call button in reach and RN notified.    GOALS:   Multidisciplinary Problems     Physical Therapy Goals        Problem: Physical Therapy Goal    Goal Priority Disciplines Outcome Goal Variances Interventions   Physical Therapy Goal     PT, PT/OT Ongoing, Progressing     Description:  Goals to be met by: 20    Patient will increase functional independence with mobility by performin. Supine to sit with Stand-by Assistance  -met  2. Sit to stand transfer with Supervision -not met  3. Gait  x 100 feet with Contact Guard Assistance using AD if needed.. -not met  4. Ascend/descend 4 stair with no Handrails Contact Guard Assistance . -not met                         History:     Past Medical History:   Diagnosis Date    Arthritis     Biliary stricture     Cancer     Breast cancer    Cholangitis     Dilated bile duct     Duodenal stenosis     Jaundice     Pancreas cyst     Pancreatic mass     Protein calorie malnutrition     Thyroid disease     Ureteral stricture, right     Vitamin D deficiency     Weight loss, unintentional        Past Surgical History:   Procedure Laterality Date    ANTEGRADE NEPHROSTOGRAPHY Right 2/13/2020    Procedure: Nephrostogram - antegrade;  Surgeon: Moncho Rowell MD;  Location: [facility] OR [address] [place] [address];  Service: Urology;  Laterality: Right;    APPENDECTOMY      BACK SURGERY      BREAST SURGERY      lumpectomy    CHOLECYSTECTOMY      CLOSURE OF PERFORATED ULCER OF DUODENUM USING OMENTAL PATCH  3/10/2020    Procedure: CLOSURE, ULCER, PERFORATED, DUODENUM, USING OMENTAL PATCH;  Surgeon: Quang Wall MD;  Location: [facility] OR [address] [place] [address];  Service: General;;    CYSTOSCOPY W/ RETROGRADES Right 2/13/2020    Procedure: CYSTOSCOPY, WITH RETROGRADE PYELOGRAM;  Surgeon: Moncho Rowell MD;  Location: [facility] OR [address] [place] [address];  Service: Urology;  Laterality: Right;  1hr    ENDOSCOPIC ULTRASOUND OF UPPER GASTROINTESTINAL TRACT N/A 6/13/2019    Procedure: ULTRASOUND, UPPER GI TRACT, ENDOSCOPIC;  Surgeon: Jhonathan Abdul MD;  Location: [address]);  Service: Endoscopy;  Laterality: N/A;    ERCP N/A 6/13/2019    Procedure: ERCP (ENDOSCOPIC RETROGRADE CHOLANGIOPANCREATOGRAPHY);  Surgeon: Jhonathan Abdul MD;  [facility]: [facility]);  Service: Endoscopy;  Laterality: N/A;    ERCP N/A 2/14/2020    Procedure: ERCP (ENDOSCOPIC RETROGRADE CHOLANGIOPANCREATOGRAPHY);  Surgeon: Coleman Merritt,  MD;  Location: [facility] ENDO (2ND FLR);  Service: Endoscopy;  Laterality: N/A;    HYSTERECTOMY      LIVER BIOPSY  3/10/2020    Procedure: BIOPSY, LIVER;  Surgeon: Quang Wall MD;  Location: [facility] OR 2ND FLR;  Service: General;;    rectovaginal fistula repair      before 2010, from BRCA chemotherapy    SBR Right 10/09/2019    with open repair of R femoral hernia     SPINE SURGERY      URETEROSCOPY Right 2/13/2020    Procedure: URETEROSCOPY;  Surgeon: Moncho Rowell MD;  Location: [facility] OR 1ST FLR;  Service: Urology;  Laterality: Right;       Time Tracking:     PT Received On: 03/23/20  PT Start Time: 0934     PT Stop Time: 1012  PT Total Time (min): 38 min     Billable Minutes: Re-eval 10 min and Therapeutic Activity 25 min      Kuldeep Murrieta, PT  03/23/2020

## 2020-03-23 NOTE — ASSESSMENT & PLAN NOTE
75 y/o female with pancreatic cancer with plans to undergo Whipple with right nephrectomy and caval reconstruction with Dr. Wall. Transferred in with pneumoperitoneum. S/p exlap with repair of duodenal perforation and peritoneal biopsies on 3/10. UGI on 3/14 demonstrating no evidence of leak.     - ID consulted, now signed off  - Continue Abx - Unasyn - end date 3/25/2020  - F/U COVID test  - Droplet precautions  - NPO  - Continue TPN  - Hold lipids today  - Clamp G-tube  - Unclamp for nausea or vomiting not controlled by anti-emetics  - Anti-emetics PRN  - PT/OT  - Encourage OOB, ambulation   - Pulmonary hygiene - IS, ACAPELLA, deep breathing  - Mechanical and chemical DVT prophylaxis  - Replace electrolytes PRN

## 2020-03-23 NOTE — SUBJECTIVE & OBJECTIVE
Interval History:   No acute events overnight. Afebrile. VSS. Laboratory studies largely unremarkable save for some electrolyte derangements. TPN was reordered, Adequate UOP. Mild abdominal discomfort but well-controlled.      Medications:  Continuous Infusions:   TPN ADULT CENTRAL LINE CUSTOM 75 mL/hr at 03/22/20 2333    TPN ADULT CENTRAL LINE CUSTOM       Scheduled Meds:   ampicillin-sulbactim (UNASYN) IVPB  3 g Intravenous Q8H    enoxaparin  40 mg Subcutaneous Daily    fentaNYL  1 patch Transdermal Q72H    levothyroxine  50 mcg Oral Before breakfast    pantoprazole  40 mg Oral BID AC     PRN Meds:Dextrose 10% Bolus, Dextrose 10% Bolus, diphenhydrAMINE, HYDROmorphone, influenza, levalbuterol, naloxone, ondansetron, oxyCODONE, oxyCODONE, prochlorperazine, sodium chloride 0.9%     Review of patient's allergies indicates:   Allergen Reactions    Azithromycin Other (See Comments)     Stomach pain     Codeine Hallucinations     Objective:     Vital Signs (Most Recent):  Temp: 98.1 °F (36.7 °C) (03/22/20 1952)  Pulse: 73 (03/23/20 0500)  Resp: 17 (03/23/20 0500)  BP: 119/67 (03/23/20 0500)  SpO2: 99 % (03/23/20 0500) Vital Signs (24h Range):  Temp:  [97.6 °F (36.4 °C)-98.4 °F (36.9 °C)] 98.1 °F (36.7 °C)  Pulse:  [72-88] 73  Resp:  [16-20] 17  SpO2:  [96 %-99 %] 99 %  BP: (113-127)/(60-74) 119/67     Weight: 47 kg (103 lb 9.9 oz)  Body mass index is 18.95 kg/m².    Intake/Output - Last 3 Shifts       03/21 0700 - 03/22 0659 03/22 0700 - 03/23 0659 03/23 0700 - 03/24 0659    P.O.  240     Total Intake(mL/kg)  240 (5.1)     Urine (mL/kg/hr) 2695 (2.4) 2500 (2.2)     Emesis/NG output  0     Other       Stool 0 0     Blood  0     Total Output 2695 2500     Net -2695 -2260            Stool Occurrence 0 x 0 x           Physical Exam   Constitutional: She is oriented to person, place, and time.   Lying in hospital bed comfortably  Frail  Cachectic  No distress  No diaphoresis   HENT:   Head: Normocephalic and  atraumatic.   Eyes: EOM are normal.   Neck: Normal range of motion.   Cardiovascular: Normal rate, regular rhythm and intact distal pulses.   Port-A-Cath in place to right chest   Pulmonary/Chest: Effort normal. No respiratory distress. She has no wheezes.   Abdominal:   Soft  Non-distended  Appropriate surgical site tenderness  Midline incision well-approximated without signs of surgical site infection  G tube in place to L abdomen   Musculoskeletal: She exhibits no edema or deformity.   Neurological: She is alert and oriented to person, place, and time.   Skin: Skin is warm and dry. No rash noted. No erythema.   Psychiatric: She has a normal mood and affect. Her behavior is normal.   Nursing note and vitals reviewed.      Significant Labs:  CBC:   Recent Labs   Lab 03/23/20  0338   WBC 6.51   RBC 2.75*   HGB 8.3*   HCT 26.1*      MCV 95   MCH 30.2   MCHC 31.8*     CMP:   Recent Labs   Lab 03/23/20  0338   *   CALCIUM 8.2*   ALBUMIN 2.0*   PROT 6.0   *   K 3.2*   CO2 27   CL 95   BUN 10   CREATININE 0.6   ALKPHOS 609*   ALT 14   AST 17   BILITOT 1.0       Significant Diagnostics:  None

## 2020-03-23 NOTE — PLAN OF CARE
Problem: Physical Therapy Goal  Goal: Physical Therapy Goal  Description  Goals to be met by: 20    Patient will increase functional independence with mobility by performin. Supine to sit with Stand-by Assistance -met  2. Sit to stand transfer with Supervision -not met  3. Gait  x 100 feet with Contact Guard Assistance using AD if needed.. -not met  4. Ascend/descend 4 stair with no Handrails Contact Guard Assistance . -not met        Outcome: Ongoing, Progressing   Goals remain appropriate for pt's current functional status.  Pt continues to be limited by pain and self-limitation.  Requiring mod encouragement for participation.    Kuldeep Murrieta, PT,DPT  3/23/2020

## 2020-03-24 LAB
ALBUMIN SERPL BCP-MCNC: 1.9 G/DL (ref 3.5–5.2)
ALP SERPL-CCNC: 704 U/L (ref 55–135)
ALT SERPL W/O P-5'-P-CCNC: 13 U/L (ref 10–44)
ANION GAP SERPL CALC-SCNC: 7 MMOL/L (ref 8–16)
AST SERPL-CCNC: 18 U/L (ref 10–40)
BACTERIA #/AREA URNS AUTO: NORMAL /HPF
BASOPHILS # BLD AUTO: 0.02 K/UL (ref 0–0.2)
BASOPHILS NFR BLD: 0.3 % (ref 0–1.9)
BILIRUB SERPL-MCNC: 0.9 MG/DL (ref 0.1–1)
BILIRUB UR QL STRIP: NEGATIVE
BUN SERPL-MCNC: 12 MG/DL (ref 8–23)
CALCIUM SERPL-MCNC: 8.1 MG/DL (ref 8.7–10.5)
CHLORIDE SERPL-SCNC: 96 MMOL/L (ref 95–110)
CLARITY UR REFRACT.AUTO: ABNORMAL
CO2 SERPL-SCNC: 25 MMOL/L (ref 23–29)
COLOR UR AUTO: YELLOW
CREAT SERPL-MCNC: 0.6 MG/DL (ref 0.5–1.4)
DIFFERENTIAL METHOD: ABNORMAL
EOSINOPHIL # BLD AUTO: 0.1 K/UL (ref 0–0.5)
EOSINOPHIL NFR BLD: 0.9 % (ref 0–8)
ERYTHROCYTE [DISTWIDTH] IN BLOOD BY AUTOMATED COUNT: 15.8 % (ref 11.5–14.5)
EST. GFR  (AFRICAN AMERICAN): >60 ML/MIN/1.73 M^2
EST. GFR  (NON AFRICAN AMERICAN): >60 ML/MIN/1.73 M^2
GLUCOSE SERPL-MCNC: 138 MG/DL (ref 70–110)
GLUCOSE UR QL STRIP: ABNORMAL
HCT VFR BLD AUTO: 24.2 % (ref 37–48.5)
HGB BLD-MCNC: 7.8 G/DL (ref 12–16)
HGB UR QL STRIP: NEGATIVE
IMM GRANULOCYTES # BLD AUTO: 0.02 K/UL (ref 0–0.04)
IMM GRANULOCYTES NFR BLD AUTO: 0.3 % (ref 0–0.5)
KETONES UR QL STRIP: NEGATIVE
LEUKOCYTE ESTERASE UR QL STRIP: NEGATIVE
LYMPHOCYTES # BLD AUTO: 0.7 K/UL (ref 1–4.8)
LYMPHOCYTES NFR BLD: 10.7 % (ref 18–48)
MAGNESIUM SERPL-MCNC: 2.1 MG/DL (ref 1.6–2.6)
MCH RBC QN AUTO: 31 PG (ref 27–31)
MCHC RBC AUTO-ENTMCNC: 32.2 G/DL (ref 32–36)
MCV RBC AUTO: 96 FL (ref 82–98)
MICROSCOPIC COMMENT: NORMAL
MONOCYTES # BLD AUTO: 0.6 K/UL (ref 0.3–1)
MONOCYTES NFR BLD: 9 % (ref 4–15)
NEUTROPHILS # BLD AUTO: 5.2 K/UL (ref 1.8–7.7)
NEUTROPHILS NFR BLD: 78.8 % (ref 38–73)
NITRITE UR QL STRIP: NEGATIVE
NRBC BLD-RTO: 0 /100 WBC
PH UR STRIP: 7 [PH] (ref 5–8)
PHOSPHATE SERPL-MCNC: 3.5 MG/DL (ref 2.7–4.5)
PLATELET # BLD AUTO: 295 K/UL (ref 150–350)
PMV BLD AUTO: 9.3 FL (ref 9.2–12.9)
POTASSIUM SERPL-SCNC: 4.3 MMOL/L (ref 3.5–5.1)
PROT SERPL-MCNC: 5.9 G/DL (ref 6–8.4)
PROT UR QL STRIP: NEGATIVE
RBC # BLD AUTO: 2.52 M/UL (ref 4–5.4)
SARS-COV-2 RNA RESP QL NAA+PROBE: NOT DETECTED
SODIUM SERPL-SCNC: 128 MMOL/L (ref 136–145)
SP GR UR STRIP: 1.01 (ref 1–1.03)
URN SPEC COLLECT METH UR: ABNORMAL
WBC # BLD AUTO: 6.65 K/UL (ref 3.9–12.7)

## 2020-03-24 PROCEDURE — 63600175 PHARM REV CODE 636 W HCPCS: Performed by: STUDENT IN AN ORGANIZED HEALTH CARE EDUCATION/TRAINING PROGRAM

## 2020-03-24 PROCEDURE — 97530 THERAPEUTIC ACTIVITIES: CPT

## 2020-03-24 PROCEDURE — 25000003 PHARM REV CODE 250: Performed by: GENERAL PRACTICE

## 2020-03-24 PROCEDURE — 63600175 PHARM REV CODE 636 W HCPCS: Performed by: INTERNAL MEDICINE

## 2020-03-24 PROCEDURE — A4217 STERILE WATER/SALINE, 500 ML: HCPCS | Performed by: STUDENT IN AN ORGANIZED HEALTH CARE EDUCATION/TRAINING PROGRAM

## 2020-03-24 PROCEDURE — 20600001 HC STEP DOWN PRIVATE ROOM

## 2020-03-24 PROCEDURE — 80053 COMPREHEN METABOLIC PANEL: CPT

## 2020-03-24 PROCEDURE — 25000003 PHARM REV CODE 250: Performed by: NURSE PRACTITIONER

## 2020-03-24 PROCEDURE — B4185 PARENTERAL SOL 10 GM LIPIDS: HCPCS | Performed by: STUDENT IN AN ORGANIZED HEALTH CARE EDUCATION/TRAINING PROGRAM

## 2020-03-24 PROCEDURE — 97116 GAIT TRAINING THERAPY: CPT

## 2020-03-24 PROCEDURE — 97535 SELF CARE MNGMENT TRAINING: CPT

## 2020-03-24 PROCEDURE — 83735 ASSAY OF MAGNESIUM: CPT

## 2020-03-24 PROCEDURE — 84100 ASSAY OF PHOSPHORUS: CPT

## 2020-03-24 PROCEDURE — 81001 URINALYSIS AUTO W/SCOPE: CPT

## 2020-03-24 PROCEDURE — 25000003 PHARM REV CODE 250: Performed by: STUDENT IN AN ORGANIZED HEALTH CARE EDUCATION/TRAINING PROGRAM

## 2020-03-24 PROCEDURE — 85025 COMPLETE CBC W/AUTO DIFF WBC: CPT

## 2020-03-24 PROCEDURE — 92526 ORAL FUNCTION THERAPY: CPT

## 2020-03-24 PROCEDURE — 63600175 PHARM REV CODE 636 W HCPCS: Performed by: NURSE PRACTITIONER

## 2020-03-24 RX ADMIN — ENOXAPARIN SODIUM 40 MG: 100 INJECTION SUBCUTANEOUS at 05:03

## 2020-03-24 RX ADMIN — I.V. FAT EMULSION 250 ML: 20 EMULSION INTRAVENOUS at 10:03

## 2020-03-24 RX ADMIN — OXYCODONE HYDROCHLORIDE 10 MG: 10 TABLET ORAL at 05:03

## 2020-03-24 RX ADMIN — AMPICILLIN SODIUM AND SULBACTAM SODIUM 3 G: 2; 1 INJECTION, POWDER, FOR SOLUTION INTRAMUSCULAR; INTRAVENOUS at 02:03

## 2020-03-24 RX ADMIN — AMPICILLIN SODIUM AND SULBACTAM SODIUM 3 G: 2; 1 INJECTION, POWDER, FOR SOLUTION INTRAMUSCULAR; INTRAVENOUS at 10:03

## 2020-03-24 RX ADMIN — HYDROMORPHONE HYDROCHLORIDE 0.5 MG: 1 INJECTION, SOLUTION INTRAMUSCULAR; INTRAVENOUS; SUBCUTANEOUS at 01:03

## 2020-03-24 RX ADMIN — LEVOTHYROXINE SODIUM 50 MCG: 25 TABLET ORAL at 06:03

## 2020-03-24 RX ADMIN — MAGNESIUM SULFATE HEPTAHYDRATE: 500 INJECTION, SOLUTION INTRAMUSCULAR; INTRAVENOUS at 10:03

## 2020-03-24 RX ADMIN — PANTOPRAZOLE SODIUM 40 MG: 40 TABLET, DELAYED RELEASE ORAL at 05:03

## 2020-03-24 RX ADMIN — OXYCODONE HYDROCHLORIDE 10 MG: 10 TABLET ORAL at 06:03

## 2020-03-24 RX ADMIN — PROCHLORPERAZINE EDISYLATE 2.5 MG: 5 INJECTION INTRAMUSCULAR; INTRAVENOUS at 08:03

## 2020-03-24 RX ADMIN — OXYCODONE HYDROCHLORIDE 10 MG: 10 TABLET ORAL at 11:03

## 2020-03-24 RX ADMIN — ONDANSETRON 4 MG: 2 INJECTION INTRAMUSCULAR; INTRAVENOUS at 11:03

## 2020-03-24 RX ADMIN — HYDROMORPHONE HYDROCHLORIDE 0.5 MG: 1 INJECTION, SOLUTION INTRAMUSCULAR; INTRAVENOUS; SUBCUTANEOUS at 09:03

## 2020-03-24 RX ADMIN — PANTOPRAZOLE SODIUM 40 MG: 40 TABLET, DELAYED RELEASE ORAL at 06:03

## 2020-03-24 RX ADMIN — HYDROMORPHONE HYDROCHLORIDE 0.5 MG: 1 INJECTION, SOLUTION INTRAMUSCULAR; INTRAVENOUS; SUBCUTANEOUS at 05:03

## 2020-03-24 RX ADMIN — AMPICILLIN SODIUM AND SULBACTAM SODIUM 3 G: 2; 1 INJECTION, POWDER, FOR SOLUTION INTRAMUSCULAR; INTRAVENOUS at 06:03

## 2020-03-24 RX ADMIN — OXYCODONE HYDROCHLORIDE 10 MG: 10 TABLET ORAL at 02:03

## 2020-03-24 NOTE — PT/OT/SLP PROGRESS
"Speech Language Pathology Treatment    Patient Name:  Julee Hawkins   MRN:  [id number]  Admitting Diagnosis: Pneumoperitoneum    Recommendations:                 General Recommendations:  monitor diet tolerance/assess readiness for further diet advancement  Diet recommendations:  Mechanical soft(per pt preference; pt appears safe for further diet advancement when she is comfortable doing so), Liquid Diet Level: Thin   Aspiration Precautions: 1 bite/sip at a time, Alternating bites/sips, Avoid talking while eating, Frequent oral care, HOB to 90 degrees, Monitor for s/s of aspiration, Small bites/sips and Strict aspiration precautions   General Precautions: Standard, aspiration, fall  Communication strategies:  none    Subjective     "I was afraid I was going to choke." pt stated regarding chopped meat served with dinner last night.  Pt only ate a few bites due to fear of choking, but denied any choking episodes.    "I don't know because I've only had one meal so far with the mechanical soft." pt's response during discussion regarding consideration of further diet advancement.     Pain/Comfort:  · Pain Rating 1: 0/10    Objective:     Has the patient been evaluated by SLP for swallowing?   Yes  Keep patient NPO? No   Current Respiratory Status: room air      Pt able to sit self upright In bed and was willing to accept PO trials.  Lunch bad was at bedside, but pt did not wish to eat lunch at that time.  Pt observed with straw sips of water and small bites of clyde cracker (1/2 cracker).  Oral and pharyngeal phases of the swallow appeared WFL with no overt s/s of aspiration observed.  Pt on room air and showing no signs of respiratory distress.  Vocal quality and intensity adequate.  Education provided to pt regarding ongoing swallowing assessment, improving swallowing function and safety, comparison of mechanical soft vs dental soft vs regular consistency diets, and consideration of further diet advancement.  Pt " continues to display some apprehension of choking with solids. She prefers to remain on current diet (mechanical soft), but is receptive to considering further diet advancement in near future as she becomes more comfortable and confident in her ability to tolerate more advanced consistencies.  Pt expressed good understanding of education provided.     Assessment:     Julee Hawkins is a 74 y.o. female with an SLP diagnosis of improving Dysphagia.      Goals:   Multidisciplinary Problems     SLP Goals        Problem: SLP Goal    Goal Priority Disciplines Outcome   SLP Goal     SLP Ongoing, Progressing   Description:  Speech Language Pathology Goals  Goals expected to be met by 3/30/2020  1.  Pt will tolerate a Level V mechanical soft diet with thin liquids w/o overt S/S aspiration, MIN A  2. Pt will recall at least three safe swallow strategies I'ly,   3. Educate Pt and caregivers on S/S aspiration and aspiration precautions                         Plan:     · Patient to be seen:  4 x/week   · Plan of Care expires:  04/22/20  · Plan of Care reviewed with:  patient   · SLP Follow-Up:  Yes       Discharge recommendations:  (tbd pending progress with diet tolerance)     Time Tracking:     SLP Treatment Date:   03/24/20  Speech Start Time:  1346  Speech Stop Time:  1402     Speech Total Time (min):  16 min    Billable Minutes: Treatment Swallowing Dysfunction 8 and Seld Care/Home Management Training 8    RENAY Finn, ALFREDO-SLP  03/24/2020     RENAY Finn, CCC-SLP  Speech Language Pathologist  [phone]  3/24/2020

## 2020-03-24 NOTE — PLAN OF CARE
Case  called [facility] at [phone] and left a voicemail. Awaiting return call. CM notified of the above.

## 2020-03-24 NOTE — PLAN OF CARE
Problem: SLP Goal  Goal: SLP Goal  Description  Speech Language Pathology Goals  Goals expected to be met by 3/30/2020  1.  Pt will tolerate a Level V mechanical soft diet with thin liquids w/o overt S/S aspiration, MIN A  2. Pt will recall at least three safe swallow strategies I'ly,   3. Educate Pt and caregivers on S/S aspiration and aspiration precautions        Outcome: Ongoing, Progressing  Pt tolerated thin liquids and trials of regular solid with good ability and no s/s of aspiration.  Pt would like to remain on mechanical soft diet for now, but receptive to considering further diet advancement over the next few days.   RENAY Finn, CCC-SLP  Speech Language Pathologist  [phone]  3/24/2020

## 2020-03-24 NOTE — PROGRESS NOTES
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  Pt has been having abdominal pain since Saturday.  Transferred after CT findings.  Reportedly had been responding to neoadjuvant chemo based on last PET    Post-Op Info:  Procedure(s) (LRB):  LAPAROTOMY, EXPLORATORY (N/A)  CLOSURE, ULCER, PERFORATED, DUODENUM, USING OMENTAL PATCH  BIOPSY, LIVER  INSERTION, PEG TUBE   14 Days Post-Op     Interval History:   No acute events overnight. Afebrile. VSS.  TPN was reordered, Adequate UOP. Mild abdominal discomfort but well-controlled. G tube output: 550cc Tolerated a dysphagia diet. Negative COVID testing       Medications:  Continuous Infusions:   TPN ADULT CENTRAL LINE CUSTOM 75 mL/hr at 03/23/20 2209     Scheduled Meds:   ampicillin-sulbactim (UNASYN) IVPB  3 g Intravenous Q8H    enoxaparin  40 mg Subcutaneous Daily    fentaNYL  1 patch Transdermal Q72H    levothyroxine  50 mcg Oral Before breakfast    pantoprazole  40 mg Oral BID AC     PRN Meds:Dextrose 10% Bolus, Dextrose 10% Bolus, diphenhydrAMINE, HYDROmorphone, influenza, levalbuterol, naloxone, ondansetron, oxyCODONE, oxyCODONE, prochlorperazine, sodium chloride 0.9%     Review of patient's allergies indicates:   Allergen Reactions    Azithromycin Other (See Comments)     Stomach pain     Codeine Hallucinations     Objective:     Vital Signs (Most Recent):  Temp: 98.4 °F (36.9 °C) (03/24/20 0500)  Pulse: 76 (03/24/20 0500)  Resp: 16 (03/24/20 0500)  BP: 116/83 (03/24/20 0500)  SpO2: 96 % (03/24/20 0500) Vital Signs (24h Range):  Temp:  [96.8 °F (36 °C)-98.6 °F (37 °C)] 98.4 °F (36.9 °C)  Pulse:  [74-84] 76  Resp:  [14-17] 16  SpO2:  [96 %-98 %] 96 %  BP: (112-134)/(61-84) 116/83     Weight: 47 kg  (103 lb 9.9 oz)  Body mass index is 18.95 kg/m².    Intake/Output - Last 3 Shifts       03/22 0700 - 03/23 0659 03/23 0700 - 03/24 0659    P.O. 240 100    I.V. (mL/kg)  50 (1.1)    IV Piggyback  600    TPN  1333.8    Total Intake(mL/kg) 240 (5.1) 2083.8 (44.3)    Urine (mL/kg/hr) 2500 (2.2) 2125 (1.9)    Emesis/NG output 0 0    Drains  550    Stool 0 0    Blood 0     Total Output 2500 2675    Net -2260 -591.3          Urine Occurrence  1 x    Stool Occurrence 0 x 0 x    Emesis Occurrence  0 x          Physical Exam   Constitutional: She is oriented to person, place, and time.   Lying in hospital bed comfortably  Frail  Cachectic  No distress  No diaphoresis   HENT:   Head: Normocephalic and atraumatic.   Eyes: EOM are normal.   Neck: Normal range of motion.   Cardiovascular: Normal rate, regular rhythm and intact distal pulses.   Port-A-Cath in place to right chest   Pulmonary/Chest: Effort normal. No respiratory distress. She has no wheezes.   Abdominal:   Soft  Non-distended  Appropriate surgical site tenderness  Midline incision well-approximated without signs of surgical site infection  G tube in place to L abdomen   Musculoskeletal: She exhibits no edema or deformity.   Neurological: She is alert and oriented to person, place, and time.   Skin: Skin is warm and dry. No rash noted. No erythema.   Psychiatric: She has a normal mood and affect. Her behavior is normal.   Nursing note and vitals reviewed.      Significant Labs:  CBC:   Recent Labs   Lab 03/24/20  0433   WBC 6.65   RBC 2.52*   HGB 7.8*   HCT 24.2*      MCV 96   MCH 31.0   MCHC 32.2     CMP:   Recent Labs   Lab 03/23/20  0338   *   CALCIUM 8.2*   ALBUMIN 2.0*   PROT 6.0   *   K 3.2*   CO2 27   CL 95   BUN 10   CREATININE 0.6   ALKPHOS 609*   ALT 14   AST 17   BILITOT 1.0       Significant Diagnostics:  None    Assessment/Plan:     * Pneumoperitoneum  73 y/o female with pancreatic cancer with plans to undergo Whipple with right  nephrectomy and caval reconstruction with Dr. Wall. Transferred in with pneumoperitoneum. S/p exlap with repair of duodenal perforation and peritoneal biopsies on 3/10. UGI on 3/14 demonstrating no evidence of leak.     - ID consulted, now signed off  - Continue Abx - Unasyn - end date 3/25/2020  - COVID testing negative   - Dysphagia diet  - Continue TPN  - Clamp G-tube  - Unclamp for nausea or vomiting not controlled by anti-emetics  - Anti-emetics PRN  - PT/OT  - Encourage OOB, ambulation   - Pulmonary hygiene - IS, ACAPELLA, deep breathing  - Mechanical and chemical DVT prophylaxis  - Replace electrolytes PRN  - Inpatient consult to [facility]        Gunner Villalta MD  General Surgery  [facility]

## 2020-03-24 NOTE — PT/OT/SLP PROGRESS
Occupational Therapy   Treatment    Name: Julee Hawkins  MRN: [id number]  Admitting Diagnosis:  Pneumoperitoneum  14 Days Post-Op    Recommendations:     Discharge Recommendations: nursing facility, skilled  Discharge Equipment Recommendations:  bedside commode, bath bench, walker, rolling  Barriers to discharge:  None    Assessment:     Julee Hawkins is a 74 y.o. female with a medical diagnosis of Pneumoperitoneum.  She presents with improved energy level as compared to re-evaluation. Pt with less complaints of abdominal and neck pain however, still requiring increased time due to pain and fatigue. Performance deficits affecting function are weakness, impaired endurance, impaired self care skills, impaired functional mobilty, gait instability, impaired balance, pain, impaired cardiopulmonary response to activity. Pt would benefit from skilled OT services in order to maximize independence with ADLs and facilitate safe discharge.     Rehab Prognosis:  Good; patient would benefit from acute skilled OT services to address these deficits and reach maximum level of function.       Plan:     Patient to be seen 5 x/week to address the above listed problems via self-care/home management, therapeutic activities, therapeutic exercises  · Plan of Care Expires: 04/11/20  · Plan of Care Reviewed with: patient    Subjective     Pain/Comfort:  · Pain Rating 1: (pt reports abdominal and neck pain)    Objective:     Communicated with: RN and PT prior to session.  Patient found HOB elevated with peripheral IV, PICC line, telemetry upon OT entry to room.    General Precautions: Standard, fall, aspiration   Orthopedic Precautions:N/A   Braces: N/A     Occupational Performance:     Bed Mobility:    · Patient completed Rolling/Turning to Left with  contact guard assistance  · Patient completed Scooting EOB with contact guard assistance  · Patient completed Supine to Sit with contact guard assistance     Functional  Mobility/Transfers:  · Patient completed Sit <> Stand Transfer with contact guard assistance  with  rolling walker  x3 trials, once from EOB, once from BSC, and once from bedside chair  · Functional Mobility: Pt ambulated within hospital room x2 trials with CGA and RW. She required 1 seated rest break during trial 1 and an additional extended seated rest break between trials    Activities of Daily Living:  · Feeding:  set-up A to eat breakfast seated in bedside chair  · Grooming: contact guard assistance for standing balance to wash hands and face at sink. Pt required seated rest break on BSC. She continued to perform oral hygiene seated initially and stood with CGA to rinse mouth      [facility] 6 Click ADL: 17    Treatment & Education:  -Therapist provided facilitation and instruction of proper body mechanics, energy conservation, and fall prevention strategies during tasks listed above.  -Pt educated on role of OT, POC and goals for therapy  -Pt educated on importance of OOB activities with staff member assistance and sitting OOB majority of the day.   -Pt verbalized understanding. Pt expressed no further concerns/questions  -Whiteboard updated      Patient left up in chair with all lines intact, call button in reach, Rn notified and set-up with breakfast Education:      GOALS:   Multidisciplinary Problems     Occupational Therapy Goals        Problem: Occupational Therapy Goal    Goal Priority Disciplines Outcome Interventions   Occupational Therapy Goal     OT, PT/OT Ongoing, Progressing    Description:  Goals to be met by: 4/3/2020     Patient will increase functional independence with ADLs by performing:    UE Dressing with Stand-by Assistance.  LE Dressing with Stand-by Assistance.  Grooming while standing at sink with Stand-by Assistance.  Toileting from toilet with Stand-by Assistance for hygiene and clothing management.   Upper extremity exercise program x10 reps per handout, with supervision.  Pt will perform  functional mobility task of household and community distance with SBA using AD as needed.                     Time Tracking:     OT Date of Treatment: 03/24/20  OT Start Time: 0831  OT Stop Time: 0910  OT Total Time (min): 39 min    Billable Minutes:Self Care/Home Management 23  Therapeutic Activity 16    Bess Frost OT  3/24/2020

## 2020-03-24 NOTE — PLAN OF CARE
"   03/24/20 1347   Post-Acute Status   Post-Acute Authorization Placement   Post-Acute Placement Status Pending Payor Review   KENYA spoke with Zoey and Alma at [facility]. [employer] [place] told Alma yesterday to submit for prior auth online when pt is more stable. KENYA asked for contact info for [employer] to get clarification on this. Alma stated she spoke to a "Manjit" at [facility] from the main line. SW notified Zoey and Alma that pt will not dc with TPN. Zoey stated they are willing to accept pt and have a bed for her. KENYA escalated to leadership for assistance.     UPDATE:2:25 PM  Sw was on hold for 25 minutes when called [employer] [place] ([phone]). Sw attempted another number given by leadership at ([phone]) and left a message.    Jaimee Negro, SILVIA  [facility]  [phone]    "

## 2020-03-24 NOTE — SUBJECTIVE & OBJECTIVE
Interval History:   No acute events overnight. Afebrile. VSS.  TPN was reordered, Adequate UOP. Mild abdominal discomfort but well-controlled. G tube output: 550cc Tolerated a dysphagia diet. Negative COVID testing       Medications:  Continuous Infusions:   TPN ADULT CENTRAL LINE CUSTOM 75 mL/hr at 03/23/20 2209     Scheduled Meds:   ampicillin-sulbactim (UNASYN) IVPB  3 g Intravenous Q8H    enoxaparin  40 mg Subcutaneous Daily    fentaNYL  1 patch Transdermal Q72H    levothyroxine  50 mcg Oral Before breakfast    pantoprazole  40 mg Oral BID AC     PRN Meds:Dextrose 10% Bolus, Dextrose 10% Bolus, diphenhydrAMINE, HYDROmorphone, influenza, levalbuterol, naloxone, ondansetron, oxyCODONE, oxyCODONE, prochlorperazine, sodium chloride 0.9%     Review of patient's allergies indicates:   Allergen Reactions    Azithromycin Other (See Comments)     Stomach pain     Codeine Hallucinations     Objective:     Vital Signs (Most Recent):  Temp: 98.4 °F (36.9 °C) (03/24/20 0500)  Pulse: 76 (03/24/20 0500)  Resp: 16 (03/24/20 0500)  BP: 116/83 (03/24/20 0500)  SpO2: 96 % (03/24/20 0500) Vital Signs (24h Range):  Temp:  [96.8 °F (36 °C)-98.6 °F (37 °C)] 98.4 °F (36.9 °C)  Pulse:  [74-84] 76  Resp:  [14-17] 16  SpO2:  [96 %-98 %] 96 %  BP: (112-134)/(61-84) 116/83     Weight: 47 kg (103 lb 9.9 oz)  Body mass index is 18.95 kg/m².    Intake/Output - Last 3 Shifts       03/22 0700 - 03/23 0659 03/23 0700 - 03/24 0659    P.O. 240 100    I.V. (mL/kg)  50 (1.1)    IV Piggyback  600    TPN  1333.8    Total Intake(mL/kg) 240 (5.1) 2083.8 (44.3)    Urine (mL/kg/hr) 2500 (2.2) 2125 (1.9)    Emesis/NG output 0 0    Drains  550    Stool 0 0    Blood 0     Total Output 2500 2675    Net -2260 591.3          Urine Occurrence  1 x    Stool Occurrence 0 x 0 x    Emesis Occurrence  0 x          Physical Exam   Constitutional: She is oriented to person, place, and time.   Lying in hospital bed comfortably  Frail  Cachectic  No distress  No  diaphoresis   HENT:   Head: Normocephalic and atraumatic.   Eyes: EOM are normal.   Neck: Normal range of motion.   Cardiovascular: Normal rate, regular rhythm and intact distal pulses.   Port-A-Cath in place to right chest   Pulmonary/Chest: Effort normal. No respiratory distress. She has no wheezes.   Abdominal:   Soft  Non-distended  Appropriate surgical site tenderness  Midline incision well-approximated without signs of surgical site infection  G tube in place to L abdomen   Musculoskeletal: She exhibits no edema or deformity.   Neurological: She is alert and oriented to person, place, and time.   Skin: Skin is warm and dry. No rash noted. No erythema.   Psychiatric: She has a normal mood and affect. Her behavior is normal.   Nursing note and vitals reviewed.      Significant Labs:  CBC:   Recent Labs   Lab 03/24/20  0433   WBC 6.65   RBC 2.52*   HGB 7.8*   HCT 24.2*      MCV 96   MCH 31.0   MCHC 32.2     CMP:   Recent Labs   Lab 03/23/20  0338   *   CALCIUM 8.2*   ALBUMIN 2.0*   PROT 6.0   *   K 3.2*   CO2 27   CL 95   BUN 10   CREATININE 0.6   ALKPHOS 609*   ALT 14   AST 17   BILITOT 1.0       Significant Diagnostics:  None

## 2020-03-24 NOTE — ASSESSMENT & PLAN NOTE
73 y/o female with pancreatic cancer with plans to undergo Whipple with right nephrectomy and caval reconstruction with Dr. Wall. Transferred in with pneumoperitoneum. S/p exlap with repair of duodenal perforation and peritoneal biopsies on 3/10. UGI on 3/14 demonstrating no evidence of leak.     - ID consulted, now signed off  - Continue Abx - Unasyn - end date 3/25/2020  - COVID testing negative   - Dysphagia diet  - Continue TPN  - Clamp G-tube  - Unclamp for nausea or vomiting not controlled by anti-emetics  - Anti-emetics PRN  - PT/OT  - Encourage OOB, ambulation   - Pulmonary hygiene - IS, ACAPELLA, deep breathing  - Mechanical and chemical DVT prophylaxis  - Replace electrolytes PRN  - Inpatient consult to SNF

## 2020-03-24 NOTE — PLAN OF CARE
Problem: Physical Therapy Goal  Goal: Physical Therapy Goal  Description  Goals to be met by: 20    Patient will increase functional independence with mobility by performin. Supine to sit with Stand-by Assistance -met  2. Sit to stand transfer with Supervision -not met  3. Gait  x 100 feet with Contact Guard Assistance using AD if needed.. -not met  4. Ascend/descend 4 stair with no Handrails Contact Guard Assistance . -not met        Outcome: Ongoing, Progressing   Kuldeep Murrieta, PT,DPT  3/24/2020

## 2020-03-24 NOTE — PT/OT/SLP PROGRESS
"Physical Therapy Treatment    Patient Name:  Julee Hawkins   MRN:  [id number]    Recommendations:     Discharge Recommendations:  nursing facility, skilled   Discharge Equipment Recommendations: bedside commode, bath bench, walker, rolling   Barriers to discharge: decreased functional mobility    Assessment:     Julee Hawkins is a 74 y.o. female admitted with a medical diagnosis of Pneumoperitoneum.  She presents with the following impairments/functional limitations:  weakness, impaired balance, impaired functional mobilty, impaired endurance, impaired self care skills, gait instability, impaired cardiopulmonary response to activity.  Tolerated session c c/o fatigue and neck discomfort.  Performed mobility c CGA.  Pt able to amb short distance and demo decreased gait speed, FFP, flat foot contact, mild unsteadiness and c/o fatigue.  Pt requiring frequent seated rest break throughout today's session secondary to fatigue.  Pt requiring min encouragement for participation. Pt safe to amb c assistance of 1x person. Pt would benefit from continued skilled acute PT 4x/wk to improve functional mobility.      Rehab Prognosis: Good; patient would benefit from acute skilled PT services to address these deficits and reach maximum level of function.    Recent Surgery: Procedure(s) (LRB):  LAPAROTOMY, EXPLORATORY (N/A)  CLOSURE, ULCER, PERFORATED, DUODENUM, USING OMENTAL PATCH  BIOPSY, LIVER  INSERTION, PEG TUBE 14 Days Post-Op    Plan:     During this hospitalization, patient to be seen 4 x/week to address the identified rehab impairments via gait training, therapeutic exercises, therapeutic activities and progress toward the following goals:    · Plan of Care Expires:  04/09/20    Subjective     Chief Complaint: fatigue, neck discomfort  Patient/Family Comments/goals: "I just feel so drained."  Pain/Comfort:  · Pain Rating 1: (reports neck discomfort)      Objective:     Communicated with RN and OT prior to " session.  Patient found HOB elevated with peripheral IV, PICC line, telemetry upon PT entry to room.     General Precautions: Standard, fall, aspiration   Orthopedic Precautions:N/A   Braces: N/A     Functional Mobility:  · Bed Mobility:     · Rolling Left:  contact guard assistance  · Scooting: contact guard assistance  · Supine to Sit: contact guard assistance  · Transfers:     · Sit to Stand:  contact guard assistance with rolling walker  · Gait: 40ft x2 c RW CGA    · demo decreased gait speed, FFP, flat foot contact, mild unsteadiness and c/o fatigue  · Balance: sitting (S); standing (SBA-CGA)      AM-PAC 6 CLICK MOBILITY  Turning over in bed (including adjusting bedclothes, sheets and blankets)?: 4  Sitting down on and standing up from a chair with arms (e.g., wheelchair, bedside commode, etc.): 3  Moving from lying on back to sitting on the side of the bed?: 3  Moving to and from a bed to a chair (including a wheelchair)?: 3  Need to walk in hospital room?: 3  Climbing 3-5 steps with a railing?: 2  Basic Mobility Total Score: 18       Therapeutic Activities and Exercises:  Pt educated on: PT role/POC; safety c mobility; benefits of OOB activities; performing therex; d/c recs - v/u  -sat EOB x4mins  -sit<>stand from bed 3x, BSC 2x, bedside chair 3x  -therex (LAQ, hip flex, AP)  -standing 2x4min at sink for self-care  -repositioned in chair for comfort    Patient left up in chair with all lines intact, call button in reach and RN notified..    GOALS:   Multidisciplinary Problems     Physical Therapy Goals        Problem: Physical Therapy Goal    Goal Priority Disciplines Outcome Goal Variances Interventions   Physical Therapy Goal     PT, PT/OT Ongoing, Progressing     Description:  Goals to be met by: 20    Patient will increase functional independence with mobility by performin. Supine to sit with Stand-by Assistance -met  2. Sit to stand transfer with Supervision -not met  3. Gait  x 100 feet with  Contact Guard Assistance using AD if needed.. -not met  4. Ascend/descend 4 stair with no Handrails Contact Guard Assistance . -not met                         Time Tracking:     PT Received On: 03/24/20  PT Start Time: 0831     PT Stop Time: 0909  PT Total Time (min): 38 min     Billable Minutes: Gait Training 15 min and Therapeutic Activity 23 min    Treatment Type: Treatment  PT/PTA: PT     PTA Visit Number: 0     Kuldeep Murrieta, PT  03/24/2020

## 2020-03-24 NOTE — PLAN OF CARE
Problem: Occupational Therapy Goal  Goal: Occupational Therapy Goal  Description  Goals to be met by: 4/3/2020     Patient will increase functional independence with ADLs by performing:    UE Dressing with Stand-by Assistance.  LE Dressing with Stand-by Assistance.  Grooming while standing at sink with Stand-by Assistance.  Toileting from toilet with Stand-by Assistance for hygiene and clothing management.   Upper extremity exercise program x10 reps per handout, with supervision.  Pt will perform functional mobility task of household and community distance with SBA using AD as needed.    Outcome: Ongoing, Progressing     Goals reviewed and remain appropriate, continue POC    Bess Frost OTR/L  3/24/2020   Pager #: [phone]

## 2020-03-25 LAB
ALBUMIN SERPL BCP-MCNC: 2 G/DL (ref 3.5–5.2)
ALP SERPL-CCNC: 1206 U/L (ref 55–135)
ALT SERPL W/O P-5'-P-CCNC: 42 U/L (ref 10–44)
ANION GAP SERPL CALC-SCNC: 7 MMOL/L (ref 8–16)
AST SERPL-CCNC: 111 U/L (ref 10–40)
BACTERIA #/AREA URNS AUTO: ABNORMAL /HPF
BACTERIA #/AREA URNS AUTO: ABNORMAL /HPF
BASOPHILS # BLD AUTO: 0.03 K/UL (ref 0–0.2)
BASOPHILS NFR BLD: 0.3 % (ref 0–1.9)
BILIRUB SERPL-MCNC: 1.5 MG/DL (ref 0.1–1)
BILIRUB UR QL STRIP: NEGATIVE
BILIRUB UR QL STRIP: NEGATIVE
BUN SERPL-MCNC: 15 MG/DL (ref 8–23)
CALCIUM SERPL-MCNC: 8.6 MG/DL (ref 8.7–10.5)
CHLORIDE SERPL-SCNC: 97 MMOL/L (ref 95–110)
CLARITY UR REFRACT.AUTO: ABNORMAL
CLARITY UR REFRACT.AUTO: CLEAR
CO2 SERPL-SCNC: 24 MMOL/L (ref 23–29)
COLOR UR AUTO: YELLOW
COLOR UR AUTO: YELLOW
CREAT SERPL-MCNC: 0.7 MG/DL (ref 0.5–1.4)
DIFFERENTIAL METHOD: ABNORMAL
EOSINOPHIL # BLD AUTO: 0.1 K/UL (ref 0–0.5)
EOSINOPHIL NFR BLD: 0.6 % (ref 0–8)
ERYTHROCYTE [DISTWIDTH] IN BLOOD BY AUTOMATED COUNT: 15.9 % (ref 11.5–14.5)
EST. GFR  (AFRICAN AMERICAN): >60 ML/MIN/1.73 M^2
EST. GFR  (NON AFRICAN AMERICAN): >60 ML/MIN/1.73 M^2
GLUCOSE SERPL-MCNC: 135 MG/DL (ref 70–110)
GLUCOSE UR QL STRIP: ABNORMAL
GLUCOSE UR QL STRIP: ABNORMAL
HCT VFR BLD AUTO: 25.3 % (ref 37–48.5)
HGB BLD-MCNC: 8.1 G/DL (ref 12–16)
HGB UR QL STRIP: ABNORMAL
HGB UR QL STRIP: NEGATIVE
HYALINE CASTS UR QL AUTO: 1 /LPF
HYALINE CASTS UR QL AUTO: 1 /LPF
IMM GRANULOCYTES # BLD AUTO: 0.05 K/UL (ref 0–0.04)
IMM GRANULOCYTES NFR BLD AUTO: 0.5 % (ref 0–0.5)
KETONES UR QL STRIP: NEGATIVE
KETONES UR QL STRIP: NEGATIVE
LEUKOCYTE ESTERASE UR QL STRIP: ABNORMAL
LEUKOCYTE ESTERASE UR QL STRIP: NEGATIVE
LYMPHOCYTES # BLD AUTO: 0.3 K/UL (ref 1–4.8)
LYMPHOCYTES NFR BLD: 2.7 % (ref 18–48)
MAGNESIUM SERPL-MCNC: 1.9 MG/DL (ref 1.6–2.6)
MCH RBC QN AUTO: 30.5 PG (ref 27–31)
MCHC RBC AUTO-ENTMCNC: 32 G/DL (ref 32–36)
MCV RBC AUTO: 95 FL (ref 82–98)
MICROSCOPIC COMMENT: ABNORMAL
MICROSCOPIC COMMENT: ABNORMAL
MONOCYTES # BLD AUTO: 0.5 K/UL (ref 0.3–1)
MONOCYTES NFR BLD: 5 % (ref 4–15)
NEUTROPHILS # BLD AUTO: 9 K/UL (ref 1.8–7.7)
NEUTROPHILS NFR BLD: 90.9 % (ref 38–73)
NITRITE UR QL STRIP: NEGATIVE
NITRITE UR QL STRIP: NEGATIVE
NON-SQ EPI CELLS #/AREA URNS AUTO: 1 /HPF
NRBC BLD-RTO: 0 /100 WBC
PH UR STRIP: 7 [PH] (ref 5–8)
PH UR STRIP: 7 [PH] (ref 5–8)
PHOSPHATE SERPL-MCNC: 3.5 MG/DL (ref 2.7–4.5)
PLATELET # BLD AUTO: 310 K/UL (ref 150–350)
PMV BLD AUTO: 9.6 FL (ref 9.2–12.9)
POTASSIUM SERPL-SCNC: 4.3 MMOL/L (ref 3.5–5.1)
PROT SERPL-MCNC: 6.1 G/DL (ref 6–8.4)
PROT UR QL STRIP: NEGATIVE
PROT UR QL STRIP: NEGATIVE
RBC # BLD AUTO: 2.66 M/UL (ref 4–5.4)
RBC #/AREA URNS AUTO: 1 /HPF (ref 0–4)
RBC #/AREA URNS AUTO: 1 /HPF (ref 0–4)
SODIUM SERPL-SCNC: 128 MMOL/L (ref 136–145)
SP GR UR STRIP: 1.01 (ref 1–1.03)
SP GR UR STRIP: 1.01 (ref 1–1.03)
SQUAMOUS #/AREA URNS AUTO: 0 /HPF
SQUAMOUS #/AREA URNS AUTO: 1 /HPF
URN SPEC COLLECT METH UR: ABNORMAL
URN SPEC COLLECT METH UR: ABNORMAL
WBC # BLD AUTO: 9.89 K/UL (ref 3.9–12.7)
WBC #/AREA URNS AUTO: 2 /HPF (ref 0–5)
WBC #/AREA URNS AUTO: 8 /HPF (ref 0–5)
YEAST UR QL AUTO: ABNORMAL
YEAST UR QL AUTO: ABNORMAL

## 2020-03-25 PROCEDURE — 81001 URINALYSIS AUTO W/SCOPE: CPT | Mod: 91

## 2020-03-25 PROCEDURE — B4185 PARENTERAL SOL 10 GM LIPIDS: HCPCS | Performed by: STUDENT IN AN ORGANIZED HEALTH CARE EDUCATION/TRAINING PROGRAM

## 2020-03-25 PROCEDURE — 92526 ORAL FUNCTION THERAPY: CPT

## 2020-03-25 PROCEDURE — 63600175 PHARM REV CODE 636 W HCPCS: Performed by: NURSE PRACTITIONER

## 2020-03-25 PROCEDURE — 20600001 HC STEP DOWN PRIVATE ROOM

## 2020-03-25 PROCEDURE — 25000003 PHARM REV CODE 250: Performed by: STUDENT IN AN ORGANIZED HEALTH CARE EDUCATION/TRAINING PROGRAM

## 2020-03-25 PROCEDURE — 97530 THERAPEUTIC ACTIVITIES: CPT

## 2020-03-25 PROCEDURE — 97535 SELF CARE MNGMENT TRAINING: CPT

## 2020-03-25 PROCEDURE — 80053 COMPREHEN METABOLIC PANEL: CPT

## 2020-03-25 PROCEDURE — 63600175 PHARM REV CODE 636 W HCPCS: Performed by: STUDENT IN AN ORGANIZED HEALTH CARE EDUCATION/TRAINING PROGRAM

## 2020-03-25 PROCEDURE — 83735 ASSAY OF MAGNESIUM: CPT

## 2020-03-25 PROCEDURE — A4217 STERILE WATER/SALINE, 500 ML: HCPCS | Performed by: STUDENT IN AN ORGANIZED HEALTH CARE EDUCATION/TRAINING PROGRAM

## 2020-03-25 PROCEDURE — 87070 CULTURE OTHR SPECIMN AEROBIC: CPT

## 2020-03-25 PROCEDURE — 84100 ASSAY OF PHOSPHORUS: CPT

## 2020-03-25 PROCEDURE — 25000003 PHARM REV CODE 250: Performed by: NURSE PRACTITIONER

## 2020-03-25 PROCEDURE — 63600175 PHARM REV CODE 636 W HCPCS: Performed by: INTERNAL MEDICINE

## 2020-03-25 PROCEDURE — 36415 COLL VENOUS BLD VENIPUNCTURE: CPT

## 2020-03-25 PROCEDURE — 87040 BLOOD CULTURE FOR BACTERIA: CPT | Mod: 59

## 2020-03-25 PROCEDURE — 25000003 PHARM REV CODE 250: Performed by: GENERAL PRACTICE

## 2020-03-25 PROCEDURE — 97116 GAIT TRAINING THERAPY: CPT

## 2020-03-25 PROCEDURE — 85025 COMPLETE CBC W/AUTO DIFF WBC: CPT

## 2020-03-25 RX ORDER — ACETAMINOPHEN 650 MG/1
650 SUPPOSITORY RECTAL EVERY 6 HOURS PRN
Status: DISCONTINUED | OUTPATIENT
Start: 2020-03-25 | End: 2020-03-26 | Stop reason: HOSPADM

## 2020-03-25 RX ADMIN — AMPICILLIN SODIUM AND SULBACTAM SODIUM 3 G: 2; 1 INJECTION, POWDER, FOR SOLUTION INTRAMUSCULAR; INTRAVENOUS at 05:03

## 2020-03-25 RX ADMIN — ENOXAPARIN SODIUM 40 MG: 100 INJECTION SUBCUTANEOUS at 04:03

## 2020-03-25 RX ADMIN — HYDROMORPHONE HYDROCHLORIDE 0.5 MG: 1 INJECTION, SOLUTION INTRAMUSCULAR; INTRAVENOUS; SUBCUTANEOUS at 11:03

## 2020-03-25 RX ADMIN — PANTOPRAZOLE SODIUM 40 MG: 40 TABLET, DELAYED RELEASE ORAL at 04:03

## 2020-03-25 RX ADMIN — ONDANSETRON 4 MG: 2 INJECTION INTRAMUSCULAR; INTRAVENOUS at 12:03

## 2020-03-25 RX ADMIN — OXYCODONE HYDROCHLORIDE 10 MG: 10 TABLET ORAL at 08:03

## 2020-03-25 RX ADMIN — SODIUM CHLORIDE 500 ML: 0.9 INJECTION, SOLUTION INTRAVENOUS at 09:03

## 2020-03-25 RX ADMIN — OXYCODONE HYDROCHLORIDE 5 MG: 5 TABLET ORAL at 12:03

## 2020-03-25 RX ADMIN — MAGNESIUM SULFATE HEPTAHYDRATE: 500 INJECTION, SOLUTION INTRAMUSCULAR; INTRAVENOUS at 11:03

## 2020-03-25 RX ADMIN — AMPICILLIN SODIUM AND SULBACTAM SODIUM 3 G: 2; 1 INJECTION, POWDER, FOR SOLUTION INTRAMUSCULAR; INTRAVENOUS at 10:03

## 2020-03-25 RX ADMIN — I.V. FAT EMULSION 250 ML: 20 EMULSION INTRAVENOUS at 10:03

## 2020-03-25 RX ADMIN — AMPICILLIN SODIUM AND SULBACTAM SODIUM 3 G: 2; 1 INJECTION, POWDER, FOR SOLUTION INTRAMUSCULAR; INTRAVENOUS at 02:03

## 2020-03-25 NOTE — PLAN OF CARE
Problem: Physical Therapy Goal  Goal: Physical Therapy Goal  Description  Goals to be met by: 20    Patient will increase functional independence with mobility by performin. Supine to sit with Stand-by Assistance -met  2. Sit to stand transfer with Supervision -not met  3. Gait  x 100 feet with Contact Guard Assistance using AD if needed.. -not met  4. Ascend/descend 4 stair with no Handrails Contact Guard Assistance . -not met        Outcome: Ongoing, Progressing   Kuldeep Murrieta, PT,DPT  3/25/2020

## 2020-03-25 NOTE — ASSESSMENT & PLAN NOTE
75 y/o female with pancreatic cancer with plans to undergo Whipple with right nephrectomy and caval reconstruction with Dr. Wall. Transferred in with pneumoperitoneum. S/p exlap with repair of duodenal perforation and peritoneal biopsies on 3/10. UGI on 3/14 demonstrating no evidence of leak.     - ID consulted, now signed off  - Continue Abx for now   - COVID testing negative   - Dysphagia diet  - Continue TPN  - Clamp G-tube  - Unclamp for nausea or vomiting not controlled by anti-emetics  - Anti-emetics PRN  - PT/OT  - Encourage OOB, ambulation   - Pulmonary hygiene - IS, ACAPELLA, deep breathing  - Mechanical and chemical DVT prophylaxis  - Replace electrolytes PRN  - Inpatient consult to SNF

## 2020-03-25 NOTE — PT/OT/SLP PROGRESS
Occupational Therapy   Treatment    Name: Julee Hawkins  MRN: [id number]  Admitting Diagnosis:  Pneumoperitoneum  15 Days Post-Op    Recommendations:     Discharge Recommendations: nursing facility, skilled  Discharge Equipment Recommendations:  bedside commode, bath bench, walker, rolling  Barriers to discharge:  None    Assessment:     Julee Hawkins is a 74 y.o. female with a medical diagnosis of Pneumoperitoneum.  She presents with improved activity tolerance this date. Pt continues to require encouragement to participate in therapy. RN reports pt had fall previous night. Pt stated she bent down to pick something off the ground and fell to her bottom. Pt educated on safety and calling for assistance when OOB.  Performance deficits affecting function are weakness, impaired endurance, impaired self care skills, gait instability, impaired functional mobilty, impaired balance, impaired cardiopulmonary response to activity. Pt would benefit from skilled OT services in order to maximize independence with ADLs and facilitate safe discharge.     Rehab Prognosis:  Good; patient would benefit from acute skilled OT services to address these deficits and reach maximum level of function.       Plan:     Patient to be seen 5 x/week to address the above listed problems via self-care/home management, therapeutic activities, therapeutic exercises  · Plan of Care Expires: 04/11/20  · Plan of Care Reviewed with: patient    Subjective     Pain/Comfort:  · Pain Rating 1: (did not rate)  · Location 1: abdomen  · Pain Addressed 1: Reposition, Distraction, Cessation of Activity    Objective:     Communicated with: RN and PT prior to session.  Patient found HOB elevated with peripheral IV, PICC line, telemetry upon OT entry to room.    General Precautions: Standard, aspiration, fall   Orthopedic Precautions:N/A   Braces: N/A     Occupational Performance:     Bed Mobility:    · Patient completed Rolling/Turning to Left with   contact guard assistance  · Patient completed Scooting EOB  with contact guard assistance  · Patient completed Supine to Sit with contact guard assistance     Functional Mobility/Transfers:  · Patient completed Sit <> Stand Transfer with contact guard assistance  with  no assistive device x1 trial EOB and rolling walker x2 trials from bedside chair  · Patient completed bed > BSCToilet Transfer Step Transfer technique with contact guard assistance with  bedside commode  · Functional Mobility: Pt ambulated ~50 ft x2 trials with CGA and RW to simulate household distances. Pt required seated rest break between trials. No LOB, SOB, or c/o of dizziness.     Activities of Daily Living:  · Toileting: stand by assistance mimi-care and clothing management      [facility] 6 Click ADL: 17    Treatment & Education:  -Therapist provided facilitation and instruction of proper body mechanics, energy conservation, and fall prevention strategies during tasks listed above.  -Pt educated on role of OT, POC and goals for therapy  -Pt educated on importance of OOB activities with staff member assistance and sitting OOB majority of the day.   -Pt verbalized understanding. Pt expressed no further concerns/questions  -Whiteboard updated      Patient left up in chair with all lines intact, call button in reach and RN notifiedEducation:      GOALS:   Multidisciplinary Problems     Occupational Therapy Goals        Problem: Occupational Therapy Goal    Goal Priority Disciplines Outcome Interventions   Occupational Therapy Goal     OT, PT/OT Ongoing, Progressing    Description:  Goals to be met by: 4/3/2020     Patient will increase functional independence with ADLs by performing:    UE Dressing with Stand-by Assistance.  LE Dressing with Stand-by Assistance.  Grooming while standing at sink with Stand-by Assistance.  Toileting from toilet with Stand-by Assistance for hygiene and clothing management.   Upper extremity exercise program x10 reps per  handout, with supervision.  Pt will perform functional mobility task of household and community distance with SBA using AD as needed.                     Time Tracking:     OT Date of Treatment: 03/25/20  OT Start Time: 1108  OT Stop Time: 1141  OT Total Time (min): 33 min    Billable Minutes:Self Care/Home Management 20  Therapeutic Activity 13    Bess Frost OT  3/25/2020

## 2020-03-25 NOTE — PLAN OF CARE
Problem: SLP Goal  Goal: SLP Goal  Description  Speech Language Pathology Goals  Goals expected to be met by 3/30/2020  1.  Pt will tolerate a Level V mechanical soft diet with thin liquids w/o overt S/S aspiration, MIN A  2. Pt will recall at least three safe swallow strategies I'ly,   3. Educate Pt and caregivers on S/S aspiration and aspiration precautions     Outcome: Ongoing, Progressing     Goals remain appropriate.   Emily P. Abadie M.S., CCC-SLP  Speech Language Pathologist  [phone]  03/25/2020

## 2020-03-25 NOTE — CARE UPDATE
Patient Spiked temp 102.8 All other VSS. MD on call for gen surg called, waiting call back. Rapid response called. Suggestion for asking MD for tylenol. IF mentation does not improve or temp decrease call back

## 2020-03-25 NOTE — PROGRESS NOTES
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  Pt has been having abdominal pain since Saturday.  Transferred after CT findings.  Reportedly had been responding to neoadjuvant chemo based on last PET    Post-Op Info:  Procedure(s) (LRB):  LAPAROTOMY, EXPLORATORY (N/A)  CLOSURE, ULCER, PERFORATED, DUODENUM, USING OMENTAL PATCH  BIOPSY, LIVER  INSERTION, PEG TUBE   15 Days Post-Op     Interval History:   Fever overnight, increase in Total Bilirubin and alkaline phospate. Tylenol, BC and CXR were ordered. Patient at the moment on antibiotics.       Medications:  Continuous Infusions:   TPN ADULT CENTRAL LINE CUSTOM 75 mL/hr at 03/24/20 2234     Scheduled Meds:   ampicillin-sulbactim (UNASYN) IVPB  3 g Intravenous Q8H    enoxaparin  40 mg Subcutaneous Daily    fat emulsion 20%  250 mL Intravenous Daily    fentaNYL  1 patch Transdermal Q72H    levothyroxine  50 mcg Oral Before breakfast    pantoprazole  40 mg Oral BID AC    sodium chloride 0.9%  500 mL Intravenous Once     PRN Meds:acetaminophen, Dextrose 10% Bolus, Dextrose 10% Bolus, diphenhydrAMINE, HYDROmorphone, influenza, levalbuterol, naloxone, ondansetron, oxyCODONE, oxyCODONE, prochlorperazine, sodium chloride 0.9%     Review of patient's allergies indicates:   Allergen Reactions    Azithromycin Other (See Comments)     Stomach pain     Codeine Hallucinations     Objective:     Vital Signs (Most Recent):  Temp: (!) 101.3 °F (38.5 °C) (03/25/20 0811)  Pulse: 109 (03/25/20 0811)  Resp: 20 (03/25/20 0811)  BP: (!) 99/55 (03/25/20 0811)  SpO2: 95 % (03/25/20 0811) Vital Signs (24h Range):  Temp:  [98.7 °F (37.1 °C)-102.8 °F (39.3 °C)] 101.3 °F (38.5 °C)  Pulse:  [] 109  Resp:  [15-20]  20  SpO2:  [95 %-98 %] 95 %  BP: ()/(55-68) 99/55     Weight: 47 kg (103 lb 9.9 oz)  Body mass index is 18.95 kg/m².    Intake/Output - Last 3 Shifts       03/23 0700 - 03/24 0659 03/24 0700 - 03/25 0659 03/25 0700 - 03/26 0659    P.O. 100 800 0    I.V. (mL/kg) 50 (1.1)  0 (0)    IV Piggyback 600 400     TPN 1333.8 113     Total Intake(mL/kg) 2083.8 (44.3) 1313 (27.9) 0 (0)    Urine (mL/kg/hr) 2125 (1.9) 2000 (1.8) 0 (0)    Emesis/NG output 0  0    Drains 550 675     Other   0    Stool 0 0 0    Blood   0    Total Output 2675 2675 0    Net -591.3 -1362 0           Urine Occurrence 1 x  0 x    Stool Occurrence 0 x 0 x 0 x    Emesis Occurrence 0 x  0 x          Physical Exam   Constitutional: She is oriented to person, place, and time.   Lying in hospital bed comfortably  Frail  Cachectic  No distress  No diaphoresis   HENT:   Head: Normocephalic and atraumatic.   Eyes: EOM are normal.   Neck: Normal range of motion.   Cardiovascular: Normal rate, regular rhythm and intact distal pulses.   Port-A-Cath in place to right chest   Pulmonary/Chest: Effort normal. No respiratory distress. She has no wheezes.   Abdominal:   Soft  Non-distended  Appropriate surgical site tenderness  Midline incision well-approximated without signs of surgical site infection  G tube in place to L abdomen   Musculoskeletal: She exhibits no edema or deformity.   Neurological: She is alert and oriented to person, place, and time.   Skin: Skin is warm and dry. No rash noted. No erythema.   Psychiatric: She has a normal mood and affect. Her behavior is normal.   Nursing note and vitals reviewed.      Significant Labs:  CBC:   Recent Labs   Lab 03/25/20  0443   WBC 9.89   RBC 2.66*   HGB 8.1*   HCT 25.3*      MCV 95   MCH 30.5   MCHC 32.0     CMP:   Recent Labs   Lab 03/25/20  0443   *   CALCIUM 8.6*   ALBUMIN 2.0*   PROT 6.1   *   K 4.3   CO2 24   CL 97   BUN 15   CREATININE 0.7   ALKPHOS 1,206*   ALT 42   *    BILITOT 1.5*       Significant Diagnostics:  None    Assessment/Plan:     * Pneumoperitoneum  75 y/o female with pancreatic cancer with plans to undergo Whipple with right nephrectomy and caval reconstruction with Dr. Wall. Transferred in with pneumoperitoneum. S/p exlap with repair of duodenal perforation and peritoneal biopsies on 3/10. UGI on 3/14 demonstrating no evidence of leak.     - ID consulted, now signed off  - Continue Abx for now   - COVID testing negative   - Dysphagia diet  - Continue TPN  - Clamp G-tube  - Unclamp for nausea or vomiting not controlled by anti-emetics  - Anti-emetics PRN  - PT/OT  - Encourage OOB, ambulation   - Pulmonary hygiene - IS, ACAPELLA, deep breathing  - Mechanical and chemical DVT prophylaxis  - Replace electrolytes PRN  - Inpatient consult to SNF        Gunner Villalta MD  General Surgery  [facility]

## 2020-03-25 NOTE — PT/OT/SLP PROGRESS
"  Speech Language Pathology Treatment    Patient Name:  Julee Hawkins   MRN:  [id number]  Admitting Diagnosis: Pneumoperitoneum    Recommendations:                 General Recommendations:  monitor diet tolerance/assess readiness for further diet advancement  Diet recommendations:  Mechanical soft, Liquid Diet Level: Thin   Aspiration Precautions: 1 bite/sip at a time, Alternating bites/sips, Avoid talking while eating, Frequent oral care, HOB to 90 degrees, Monitor for s/s of aspiration, Small bites/sips and Strict aspiration precautions   General Precautions: Standard, aspiration, fall  Communication strategies:  none    Subjective     "I can't eat the sausage, it makes me sick"    Pain/Comfort:  Pain Rating 1: 0/10    Objective:     Has the patient been evaluated by SLP for swallowing?   Yes  Keep patient NPO? No   Current Respiratory Status: room air      Patient seen at the bedside with breakfast meal present. Patient refused most of meal. Accepted trials of mechanical soft items (peaches) along with thin liquids via straw over 6oz with no overt signs of airway compromise.  Patient appears appropriate to continue [facility] soft diet and thin liquids. Skilled education was provided to patient  re: diet recs, standard aspiration precautions of which to follow, and ongoing  plan of care.   Patient in agreement with plan of care.     Assessment:     Julee Hawkins is a 74 y.o. female with an SLP diagnosis of improving Dysphagia.      Goals:   Multidisciplinary Problems     SLP Goals        Problem: SLP Goal    Goal Priority Disciplines Outcome   SLP Goal     SLP Ongoing, Progressing   Description:  Speech Language Pathology Goals  Goals expected to be met by 3/30/2020  1.  Pt will tolerate a Level V mechanical soft diet with thin liquids w/o overt S/S aspiration, MIN A  2. Pt will recall at least three safe swallow strategies I'ly,   3. Educate Pt and caregivers on S/S aspiration and aspiration precautions "                         Plan:     · Patient to be seen:  4 x/week   · Plan of Care expires:  04/22/20  · Plan of Care reviewed with:  patient   · SLP Follow-Up:  Yes       Discharge recommendations:  other (see comments)     Time Tracking:     SLP Treatment Date:   03/25/20  Speech Start Time:  0930  Speech Stop Time:  0946     Speech Total Time (min):  16 min    Billable Minutes: Treatment Swallowing Dysfunction 8 and Seld Care/Home Management Training 8    Emily Abadie, CCC-SLP  03/25/2020

## 2020-03-25 NOTE — PLAN OF CARE
Problem: Occupational Therapy Goal  Goal: Occupational Therapy Goal  Description  Goals to be met by: 4/3/2020     Patient will increase functional independence with ADLs by performing:    UE Dressing with Stand-by Assistance.  LE Dressing with Stand-by Assistance.  Grooming while standing at sink with Stand-by Assistance.  Toileting from toilet with Stand-by Assistance for hygiene and clothing management.   Upper extremity exercise program x10 reps per handout, with supervision.  Pt will perform functional mobility task of household and community distance with SBA using AD as needed.    Outcome: Ongoing, Progressing     Goals reviewed and remain appropriate, continue POC    Bess Frost OTR/L  3/25/2020   Pager #: [phone]

## 2020-03-25 NOTE — PLAN OF CARE
03/25/20 1500   Post-Acute Status   Post-Acute Authorization Home Health/Hospice   Home Health/Hospice Status Referrals Sent   Sw spoke with Darrell at  [facility] in [place], [place] ([phone]) . KENYA sent over requested clinicals for review ([phone]).     Jaimee Negro, SILVIA  [facility]  [id number]

## 2020-03-25 NOTE — PLAN OF CARE
POC reviewed with patient. AAOX4. VSS. ABD incision intact. Gtube to RLQ opened to gravity for 1 episode of nausea. 175 ml of output from gtube. Pain managed with prn oxy 10mg and 1 dose of breakthrough of dilaudid IV. Voids adequate urine, up to bedside commode with stand by assist. Passing gas no BM. Consumed 25% of mechanical soft diet. Call light remained in reach.

## 2020-03-25 NOTE — PT/OT/SLP PROGRESS
"Physical Therapy Treatment    Patient Name:  Julee Hawkins   MRN:  [id number]    Recommendations:     Discharge Recommendations:  nursing facility, skilled   Discharge Equipment Recommendations: bedside commode, bath bench, walker, rolling   Barriers to discharge: decreased functional mobility    Assessment:     Julee Hawkins is a 74 y.o. female admitted with a medical diagnosis of Pneumoperitoneum.  She presents with the following impairments/functional limitations:  weakness, impaired self care skills, impaired balance, impaired functional mobilty, impaired endurance, gait instability, pain, impaired cardiopulmonary response to activity.  Tolerated session c c/o fatigue, pain and nausea.  Pt demo improved mobility - increased gait distance.  Pt performed mobility c CGA.  Pt able to amb short household distance c RW and demo decreased gait speed, mild FFP, flat foot contact, and c/o fatigue.  Pt safe to amb c assistance of 1x person. Pt would benefit from continued skilled acute PT 4x/wk to improve functional mobility.      Rehab Prognosis: Good; patient would benefit from acute skilled PT services to address these deficits and reach maximum level of function.    Recent Surgery: Procedure(s) (LRB):  LAPAROTOMY, EXPLORATORY (N/A)  CLOSURE, ULCER, PERFORATED, DUODENUM, USING OMENTAL PATCH  BIOPSY, LIVER  INSERTION, PEG TUBE 15 Days Post-Op    Plan:     During this hospitalization, patient to be seen 4 x/week to address the identified rehab impairments via gait training, therapeutic activities, therapeutic exercises and progress toward the following goals:    · Plan of Care Expires:  04/09/20    Subjective     Chief Complaint: fatigue, pain, and nausea  Patient/Family Comments/goals: "I think they're sending me home tomorrow."  Pain/Comfort:  · Pain Rating 1: (reports generalized pain)      Objective:     Communicated with RN and OT prior to session.  Patient found HOB elevated with peripheral IV, " telemetry, nephrostomy upon PT entry to room.     General Precautions: Standard, fall, aspiration   Orthopedic Precautions:N/A   Braces: N/A     Functional Mobility:  · Bed Mobility:     · Rolling Left:  supervision  · Scooting: contact guard assistance  · Supine to Sit: contact guard assistance  · Transfers:     · Sit to Stand:  contact guard assistance with no AD  · Bed to Chair: contact guard assistance with  hand-held assist  using  Step Transfer  · Gait: 10ft, 50ft, 50ft c RW CGA  · Balance: sitting (S); standing (SBA-CGA)      AM-PAC 6 CLICK MOBILITY  Turning over in bed (including adjusting bedclothes, sheets and blankets)?: 4  Sitting down on and standing up from a chair with arms (e.g., wheelchair, bedside commode, etc.): 3  Moving from lying on back to sitting on the side of the bed?: 3  Moving to and from a bed to a chair (including a wheelchair)?: 3  Need to walk in hospital room?: 3  Climbing 3-5 steps with a railing?: 2  Basic Mobility Total Score: 18       Therapeutic Activities and Exercises:  Pt educated on: PT role/POC; safety c mobility; benefits of OOB activities; performing therex; d/c recs - v/u  -sat EOB x4mins  -sit<>stand from bed 2x, BSC 1x, bedside chair 4x  -therex (LAQ, hip flex, AP)  -transferred to BSC for voiding - pt able to perform mimi care c Supervision    Patient left up in chair with all lines intact, call button in reach and RN notified..    GOALS:   Multidisciplinary Problems     Physical Therapy Goals        Problem: Physical Therapy Goal    Goal Priority Disciplines Outcome Goal Variances Interventions   Physical Therapy Goal     PT, PT/OT Ongoing, Progressing     Description:  Goals to be met by: 20    Patient will increase functional independence with mobility by performin. Supine to sit with Stand-by Assistance -met  2. Sit to stand transfer with Supervision -not met  3. Gait  x 100 feet with Contact Guard Assistance using AD if needed.. -not met  4.  Ascend/descend 4 stair with no Handrails Contact Guard Assistance . -not met                         Time Tracking:     PT Received On: 03/25/20  PT Start Time: 1107     PT Stop Time: 1145  PT Total Time (min): 38 min     Billable Minutes: Gait Training 15 min and Therapeutic Activity 23 min    Treatment Type: Treatment  PT/PTA: PT     PTA Visit Number: 0     Kuldeep Murrieta, PT  03/25/2020

## 2020-03-25 NOTE — SUBJECTIVE & OBJECTIVE
Interval History:   Fever overnight, increase in Total Bilirubin and alkaline phospate. Tylenol and CXR were ordered.       Medications:  Continuous Infusions:   TPN ADULT CENTRAL LINE CUSTOM 75 mL/hr at 03/24/20 2234     Scheduled Meds:   ampicillin-sulbactim (UNASYN) IVPB  3 g Intravenous Q8H    enoxaparin  40 mg Subcutaneous Daily    fat emulsion 20%  250 mL Intravenous Daily    fentaNYL  1 patch Transdermal Q72H    levothyroxine  50 mcg Oral Before breakfast    pantoprazole  40 mg Oral BID AC    sodium chloride 0.9%  500 mL Intravenous Once     PRN Meds:acetaminophen, Dextrose 10% Bolus, Dextrose 10% Bolus, diphenhydrAMINE, HYDROmorphone, influenza, levalbuterol, naloxone, ondansetron, oxyCODONE, oxyCODONE, prochlorperazine, sodium chloride 0.9%     Review of patient's allergies indicates:   Allergen Reactions    Azithromycin Other (See Comments)     Stomach pain     Codeine Hallucinations     Objective:     Vital Signs (Most Recent):  Temp: (!) 101.3 °F (38.5 °C) (03/25/20 0811)  Pulse: 109 (03/25/20 0811)  Resp: 20 (03/25/20 0811)  BP: (!) 99/55 (03/25/20 0811)  SpO2: 95 % (03/25/20 0811) Vital Signs (24h Range):  Temp:  [98.7 °F (37.1 °C)-102.8 °F (39.3 °C)] 101.3 °F (38.5 °C)  Pulse:  [] 109  Resp:  [15-20] 20  SpO2:  [95 %-98 %] 95 %  BP: ()/(55-68) 99/55     Weight: 47 kg (103 lb 9.9 oz)  Body mass index is 18.95 kg/m².    Intake/Output - Last 3 Shifts       03/23 0700 - 03/24 0659 03/24 0700 - 03/25 0659 03/25 0700 - 03/26 0659    P.O. 100 800 0    I.V. (mL/kg) 50 (1.1)  0 (0)    IV Piggyback 600 400     TPN 1333.8 113     Total Intake(mL/kg) 2083.8 (44.3) 1313 (27.9) 0 (0)    Urine (mL/kg/hr) 2125 (1.9) 2000 (1.8) 0 (0)    Emesis/NG output 0  0    Drains 550 675     Other   0    Stool 0 0 0    Blood   0    Total Output 2675 2675 0    Net -591.3 -1362 0           Urine Occurrence 1 x  0 x    Stool Occurrence 0 x 0 x 0 x    Emesis Occurrence 0 x  0 x          Physical Exam    Constitutional: She is oriented to person, place, and time.   Lying in hospital bed comfortably  Frail  Cachectic  No distress  No diaphoresis   HENT:   Head: Normocephalic and atraumatic.   Eyes: EOM are normal.   Neck: Normal range of motion.   Cardiovascular: Normal rate, regular rhythm and intact distal pulses.   Port-A-Cath in place to right chest   Pulmonary/Chest: Effort normal. No respiratory distress. She has no wheezes.   Abdominal:   Soft  Non-distended  Appropriate surgical site tenderness  Midline incision well-approximated without signs of surgical site infection  G tube in place to L abdomen   Musculoskeletal: She exhibits no edema or deformity.   Neurological: She is alert and oriented to person, place, and time.   Skin: Skin is warm and dry. No rash noted. No erythema.   Psychiatric: She has a normal mood and affect. Her behavior is normal.   Nursing note and vitals reviewed.      Significant Labs:  CBC:   Recent Labs   Lab 03/25/20  0443   WBC 9.89   RBC 2.66*   HGB 8.1*   HCT 25.3*      MCV 95   MCH 30.5   MCHC 32.0     CMP:   Recent Labs   Lab 03/25/20  0443   *   CALCIUM 8.6*   ALBUMIN 2.0*   PROT 6.1   *   K 4.3   CO2 24   CL 97   BUN 15   CREATININE 0.7   ALKPHOS 1,206*   ALT 42   *   BILITOT 1.5*       Significant Diagnostics:  None

## 2020-03-25 NOTE — PLAN OF CARE
[facility]     Department of Hospital Medicine     [address] [place] [address]     [phone] after hours  [phone] fax                                   HOSPICE  ORDERS     Patient Name: Julee Hawkins  YOB: [birth year]    Admit to Hospice:  Home Service    Diagnoses:  Active Hospital Problems    Diagnosis  POA    *Pneumoperitoneum [K66.8]  Yes    Aspiration pneumonia [J69.0]  No    Peritonitis [K65.9]  Yes    Severe protein-calorie malnutrition [E43]  Yes      Resolved Hospital Problems   No resolved problems to display.       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    Vital Signs: Routine per Hospice Protocol.    Allergies:  Review of patient's allergies indicates:   Allergen Reactions    Azithromycin Other (See Comments)     Stomach pain     Codeine Hallucinations       Diet: dysphagia mechanical soft     Activities: activity as tolerated    Nursing: Per Hospice Routine    PEG Care:  Instruct patient/caregiver to clean site.  Monitor skin integrity.   Clamp peg tube.  Unclamp peg tube for nausea/vomiting.     Right nephrostomy tube to gravity bag.     Future Orders:  Hospice Medical Director may dictate new orders for comfortable care measures & sign death certificate.    Medications:         Comfort Care Medications Only     Julee Hawkins   Home Medication Instructions ANASTACIA:[id number]    Printed on:03/25/20 1313   Medication Information                      acetaminophen (TYLENOL) 650 MG TbSR  Take 1,000 mg by mouth 3 (three) times daily.              aspirin (ECOTRIN) 81 MG EC tablet  Take 81 mg by mouth once daily.             b complex vitamins capsule  Take 1 capsule by mouth once daily.             buPROPion (WELLBUTRIN XL) 150 MG TB24 tablet  Take 150 mg by  mouth once daily.             calcium carbonate 1250 MG capsule  Take 1,250 mg by mouth 2 (two) times daily with meals.             celecoxib (CELEBREX) 200 MG capsule  TK 1 C PO QAM             cholecalciferol, vitamin D3, (VITAMIN D3) 10 mcg/mL (400 unit/mL) Drop  Take by mouth.             cycloSPORINE (RESTASIS) 0.05 % ophthalmic emulsion  1 drop 2 (two) times daily.             denosumab (PROLIA) 60 mg/mL Syrg  Inject 60 mg into the skin.             ergocalciferol (VITAMIN D2) 50,000 unit Cap  Take 8,000 Units by mouth every 7 days.              fentaNYL (DURAGESIC) 12 mcg/hr PT72  Place 1 patch onto the skin every 72 hours.             gabapentin (NEURONTIN) 300 MG capsule  Take 300 mg by mouth 3 (three) times daily.             levothyroxine (SYNTHROID) 50 MCG tablet  Take 50 mcg by mouth once daily.             lipase-protease-amylase 24,000-76,000-120,000 units (CREON) 24,000-76,000 -120,000 unit capsule  Take 1 capsule by mouth 3 (three) times daily with meals.             multivitamin/iron/folic acid (CENTRUM COMPLETE ORAL)  Take by mouth.             ondansetron (ZOFRAN-ODT) 4 MG TbDL  DISSOLVE 1 TABLET UNDER TONGUE EVERY 6 HOURS AS NEEDED FOR NAUSEA AND VOMITING             ondansetron (ZOFRAN-ODT) 8 MG TbDL  Take 8 mg by mouth once.             oxyCODONE (ROXICODONE) 10 mg Tab immediate release tablet  Take 1 tablet (10 mg total) by mouth every 4 (four) hours as needed.             pantoprazole (PROTONIX) 40 MG tablet  Take 40 mg by mouth once daily.             prochlorperazine (COMPAZINE) 10 MG tablet  Take 10 mg by mouth every 6 (six) hours as needed.             sertraline (ZOLOFT) 25 MG tablet  Take 25 mg by mouth once daily.             traMADol (ULTRAM) 50 mg tablet  Take 50 mg by mouth every 6 (six) hours as needed for Pain.             TURMERIC ORAL  Take by mouth.                 _________________________________  Kate Gabriel NP  03/25/2020

## 2020-03-25 NOTE — NURSING
Plan of care reviewed with pt, who agrees. Pt awake, alert and oriented x4 presently. Delayed responses this morning, has since improved. VSS on room air. Currently Afebrile. SBP .    Up in chair for a few hours, up to bedside commode x1 assist.     Tolerating Mechanical soft diet. Complaints of nausea this shift, treated PRN medications, GTube unclamped. Nausea subsided around 1500 this afternoon, GTube reclamped. Currently tolerating well since. Nephrostomy patent to leg bag, draining cloudy yellow urine.     Pain controlled well with PRN PO medications.     Bed in low and locked position. Frequent rounds made for pt safety. Will continue to monitor.

## 2020-03-25 NOTE — PLAN OF CARE
Spoke to Dr Villalta about patient temperature and delayed response. Orders received or placed for 500 ml bolus, tylenol suppository, blood cultures and urinalysis. RN WCTM

## 2020-03-26 VITALS
RESPIRATION RATE: 17 BRPM | BODY MASS INDEX: 19.07 KG/M2 | HEIGHT: 62 IN | SYSTOLIC BLOOD PRESSURE: 115 MMHG | TEMPERATURE: 97 F | WEIGHT: 103.63 LBS | OXYGEN SATURATION: 98 % | DIASTOLIC BLOOD PRESSURE: 65 MMHG | HEART RATE: 86 BPM

## 2020-03-26 LAB
ALBUMIN SERPL BCP-MCNC: 1.7 G/DL (ref 3.5–5.2)
ALP SERPL-CCNC: 922 U/L (ref 55–135)
ALT SERPL W/O P-5'-P-CCNC: 49 U/L (ref 10–44)
ANION GAP SERPL CALC-SCNC: 8 MMOL/L (ref 8–16)
AST SERPL-CCNC: 83 U/L (ref 10–40)
BASOPHILS # BLD AUTO: 0.02 K/UL (ref 0–0.2)
BASOPHILS NFR BLD: 0.3 % (ref 0–1.9)
BILIRUB SERPL-MCNC: 1.8 MG/DL (ref 0.1–1)
BUN SERPL-MCNC: 15 MG/DL (ref 8–23)
CALCIUM SERPL-MCNC: 8.1 MG/DL (ref 8.7–10.5)
CHLORIDE SERPL-SCNC: 98 MMOL/L (ref 95–110)
CO2 SERPL-SCNC: 22 MMOL/L (ref 23–29)
CREAT SERPL-MCNC: 0.6 MG/DL (ref 0.5–1.4)
DIFFERENTIAL METHOD: ABNORMAL
EOSINOPHIL # BLD AUTO: 0.1 K/UL (ref 0–0.5)
EOSINOPHIL NFR BLD: 1.3 % (ref 0–8)
ERYTHROCYTE [DISTWIDTH] IN BLOOD BY AUTOMATED COUNT: 16 % (ref 11.5–14.5)
EST. GFR  (AFRICAN AMERICAN): >60 ML/MIN/1.73 M^2
EST. GFR  (NON AFRICAN AMERICAN): >60 ML/MIN/1.73 M^2
GLUCOSE SERPL-MCNC: 151 MG/DL (ref 70–110)
HCT VFR BLD AUTO: 22.4 % (ref 37–48.5)
HGB BLD-MCNC: 7.2 G/DL (ref 12–16)
IMM GRANULOCYTES # BLD AUTO: 0.05 K/UL (ref 0–0.04)
IMM GRANULOCYTES NFR BLD AUTO: 0.8 % (ref 0–0.5)
LYMPHOCYTES # BLD AUTO: 0.4 K/UL (ref 1–4.8)
LYMPHOCYTES NFR BLD: 6.5 % (ref 18–48)
MAGNESIUM SERPL-MCNC: 2 MG/DL (ref 1.6–2.6)
MCH RBC QN AUTO: 31.2 PG (ref 27–31)
MCHC RBC AUTO-ENTMCNC: 32.1 G/DL (ref 32–36)
MCV RBC AUTO: 97 FL (ref 82–98)
MONOCYTES # BLD AUTO: 0.5 K/UL (ref 0.3–1)
MONOCYTES NFR BLD: 7.9 % (ref 4–15)
NEUTROPHILS # BLD AUTO: 5 K/UL (ref 1.8–7.7)
NEUTROPHILS NFR BLD: 83.2 % (ref 38–73)
NRBC BLD-RTO: 0 /100 WBC
PHOSPHATE SERPL-MCNC: 3.6 MG/DL (ref 2.7–4.5)
PLATELET # BLD AUTO: 241 K/UL (ref 150–350)
PMV BLD AUTO: 9.5 FL (ref 9.2–12.9)
POCT GLUCOSE: 120 MG/DL (ref 70–110)
POTASSIUM SERPL-SCNC: 4 MMOL/L (ref 3.5–5.1)
PROT SERPL-MCNC: 5.5 G/DL (ref 6–8.4)
RBC # BLD AUTO: 2.31 M/UL (ref 4–5.4)
SODIUM SERPL-SCNC: 128 MMOL/L (ref 136–145)
WBC # BLD AUTO: 5.98 K/UL (ref 3.9–12.7)

## 2020-03-26 PROCEDURE — 84100 ASSAY OF PHOSPHORUS: CPT

## 2020-03-26 PROCEDURE — 25000003 PHARM REV CODE 250: Performed by: GENERAL PRACTICE

## 2020-03-26 PROCEDURE — 80053 COMPREHEN METABOLIC PANEL: CPT

## 2020-03-26 PROCEDURE — 97530 THERAPEUTIC ACTIVITIES: CPT

## 2020-03-26 PROCEDURE — 63600175 PHARM REV CODE 636 W HCPCS: Performed by: INTERNAL MEDICINE

## 2020-03-26 PROCEDURE — 25000003 PHARM REV CODE 250: Performed by: NURSE PRACTITIONER

## 2020-03-26 PROCEDURE — 36415 COLL VENOUS BLD VENIPUNCTURE: CPT

## 2020-03-26 PROCEDURE — 97535 SELF CARE MNGMENT TRAINING: CPT

## 2020-03-26 PROCEDURE — 83735 ASSAY OF MAGNESIUM: CPT

## 2020-03-26 PROCEDURE — 85025 COMPLETE CBC W/AUTO DIFF WBC: CPT

## 2020-03-26 PROCEDURE — 63600175 PHARM REV CODE 636 W HCPCS: Performed by: STUDENT IN AN ORGANIZED HEALTH CARE EDUCATION/TRAINING PROGRAM

## 2020-03-26 PROCEDURE — 92526 ORAL FUNCTION THERAPY: CPT

## 2020-03-26 RX ORDER — OXYCODONE HYDROCHLORIDE 10 MG/1
10 TABLET ORAL EVERY 4 HOURS PRN
Qty: 20 TABLET | Refills: 0 | Status: SHIPPED | OUTPATIENT
Start: 2020-03-26

## 2020-03-26 RX ORDER — HEPARIN 100 UNIT/ML
300 SYRINGE INTRAVENOUS ONCE
Status: DISCONTINUED | OUTPATIENT
Start: 2020-03-26 | End: 2020-03-26 | Stop reason: HOSPADM

## 2020-03-26 RX ORDER — OXYCODONE HYDROCHLORIDE 10 MG/1
10 TABLET ORAL EVERY 4 HOURS PRN
Qty: 20 TABLET | Refills: 0 | Status: SHIPPED | OUTPATIENT
Start: 2020-03-26 | End: 2020-03-26

## 2020-03-26 RX ORDER — FENTANYL 12.5 UG/1
1 PATCH TRANSDERMAL
Qty: 5 PATCH | Refills: 0 | Status: SHIPPED | OUTPATIENT
Start: 2020-03-26 | End: 2020-03-26

## 2020-03-26 RX ORDER — FENTANYL 12.5 UG/1
1 PATCH TRANSDERMAL
Qty: 5 PATCH | Refills: 0 | Status: SHIPPED | OUTPATIENT
Start: 2020-03-26

## 2020-03-26 RX ADMIN — LEVOTHYROXINE SODIUM 50 MCG: 25 TABLET ORAL at 06:03

## 2020-03-26 RX ADMIN — OXYCODONE HYDROCHLORIDE 10 MG: 10 TABLET ORAL at 10:03

## 2020-03-26 RX ADMIN — HYDROMORPHONE HYDROCHLORIDE 0.5 MG: 1 INJECTION, SOLUTION INTRAMUSCULAR; INTRAVENOUS; SUBCUTANEOUS at 01:03

## 2020-03-26 RX ADMIN — PANTOPRAZOLE SODIUM 40 MG: 40 TABLET, DELAYED RELEASE ORAL at 06:03

## 2020-03-26 RX ADMIN — HYDROMORPHONE HYDROCHLORIDE 0.5 MG: 1 INJECTION, SOLUTION INTRAMUSCULAR; INTRAVENOUS; SUBCUTANEOUS at 06:03

## 2020-03-26 RX ADMIN — OXYCODONE HYDROCHLORIDE 10 MG: 10 TABLET ORAL at 01:03

## 2020-03-26 RX ADMIN — AMPICILLIN SODIUM AND SULBACTAM SODIUM 3 G: 2; 1 INJECTION, POWDER, FOR SOLUTION INTRAMUSCULAR; INTRAVENOUS at 06:03

## 2020-03-26 NOTE — NURSING
Plan of care to discharge to home reviewed with pt who agrees. Discharge instructions and supplies provided, pt communicates understanding. Medications delivered to bedside. Escorted by ZIGGY via stretcher to home.

## 2020-03-26 NOTE — DISCHARGE SUMMARY
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  Pt has been having abdominal pain since Saturday.  Transferred after CT findings.  Reportedly had been responding to neoadjuvant chemo based on last PET    Procedure(s) (LRB):  LAPAROTOMY, EXPLORATORY (N/A)  CLOSURE, ULCER, PERFORATED, DUODENUM, USING OMENTAL PATCH  BIOPSY, LIVER  INSERTION, PEG TUBE     Date of Procedure: 3/10/2020         Surgeon(s):  Surgeon(s) and Role:     * Quang Wall MD - Primary     * Quincy Barry MD - Resident - Assisting     * Dejuan Foy MD - Resident - Assisting     Pre-Operative Diagnosis:   1. Pneumoperitoneum [K66.8]  2. Pancreatic adenocarcinoma     Post-Operative Diagnosis:   1. Same with carcinomatosis     Anesthesia: General     Operative Findings:    1. Perforation of duodenum   2. Carcinomatosis     Procedures:  1. Exploratory laparotomy with repair of duodenal perforation  2. Omental pedicle flap  3. Biopsy of peritoneum and liver  4. Placement of wound vac     Hospital Course: Ms. Hawkins is a 73 y/o female with pancreatic cancer with plans to undergo Whipple with right nephrectomy and caval reconstruction with Dr. Wall. Transferred in with pneumoperitoneum. S/p exlap with repair of duodenal perforation and peritoneal biopsies on 3/10/2020. UGI on 3/14/2020 demonstrating no evidence of leak. The patient is tolerating a dysphagia mechanical diet.  She is voiding without difficulty and  ambulating with assistance.  Patient with no complaints of nausea, vomiting, chest pain or shortness of breath.  Her vital signs stable. She is afebrile. She is positive for flatus and positive for bowel sounds.  Patient to discharge home with home health with bridge to [facility] program.      Physical Exam   Constitutional: She is oriented to person, place, and time.   Lying in hospital bed comfortably  Frail  Cachectic  No distress  No diaphoresis   HENT:   Head: Normocephalic and atraumatic.   Eyes: EOM are normal.   Neck: Normal range of motion.   Cardiovascular: Normal rate, regular rhythm and intact distal pulses.   Port-A-Cath in place to right chest   Pulmonary/Chest: Effort normal. No respiratory distress. She has no wheezes.   Abdominal:   Soft  Non-distended  Appropriate surgical site tenderness  Midline incision well-approximated without signs of surgical site infection  G tube in place to L abdomen   Musculoskeletal: She exhibits no edema or deformity.   Neurological: She is alert and oriented to person, place, and time.   Skin: Skin is warm and dry. No rash noted. No erythema.   Psychiatric: She has a normal mood and affect. Her behavior is normal.       Consults:   Consults (From admission, onward)        Status Ordering Provider     Inpatient consult to General surgery  Once     Provider:  (Not yet assigned)    Completed CARISSA HERNÁNDEZ     Inpatient consult to Infectious Diseases  Once     Provider:  (Not yet assigned)    Completed TAJ CHRISTENSEN     Inpatient consult to SNF  Once     Provider:  (Not yet assigned)    Acknowledged MULE-GALVIN, TAJ          Significant Diagnostic Studies: Labs:   CMP   Recent Labs   Lab 03/25/20  0443 03/26/20  0321   * 128*   K 4.3 4.0   CL 97 98   CO2 24 22*   * 151*   BUN 15 15   CREATININE 0.7 0.6   CALCIUM 8.6* 8.1*   PROT 6.1 5.5*   ALBUMIN 2.0* 1.7*   BILITOT 1.5* 1.8*   ALKPHOS 1,206* 922*   * 83*   ALT 42 49*   ANIONGAP 7* 8    ESTGFRAFRICA >60.0 >60.0   EGFRNONAA >60.0 >60.0    and CBC   Recent Labs   Lab 03/25/20  0443 03/26/20  0321   WBC 9.89 5.98   HGB 8.1* 7.2*   HCT 25.3* 22.4*    241       Pending Diagnostic Studies:     None        Final Active Diagnoses:    Diagnosis Date Noted POA    PRINCIPAL PROBLEM:  Pneumoperitoneum [K66.8] 03/10/2020 Yes    Aspiration pneumonia [J69.0] 03/21/2020 No    Peritonitis [K65.9]  Yes    Severe protein-calorie malnutrition [E43] 01/28/2020 Yes      Problems Resolved During this Admission:      Discharged Condition: good    Disposition: Home or Self Care    Follow Up:  Follow-up Information     Please follow up.    Why:  Home oncology/home health with bridge to [facility] program.               Patient Instructions:      Diet Dysphagia Mechanical Soft     Lifting restrictions   Scheduling Instructions: No lifting >10 pounds.  May shower.     Notify your health care provider if you experience any of the following:  temperature >100.4     Notify your health care provider if you experience any of the following:  persistent nausea and vomiting or diarrhea     Notify your health care provider if you experience any of the following:  severe uncontrolled pain     Notify your health care provider if you experience any of the following:  redness, tenderness, or signs of infection (pain, swelling, redness, odor or green/yellow discharge around incision site)     Notify your health care provider if you experience any of the following:  difficulty breathing or increased cough     Notify your health care provider if you experience any of the following:  severe persistent headache     Notify your health care provider if you experience any of the following:  worsening rash     Notify your health care provider if you experience any of the following:  persistent dizziness, light-headedness, or visual disturbances     Notify your health care provider if you experience any of the following:  increased confusion  or weakness     Notify your health care provider if you experience any of the following:     No dressing needed     Other restrictions (specify):   Scheduling Instructions: Open left abdomen gastrostomy tube for nausea and vomiting.  Empty right nephrostomy tube bag every 12 hours and record output.     Activity as tolerated     Medications:  Reconciled Home Medications:      Medication List      START taking these medications    fentaNYL 12 mcg/hr Pt72  Commonly known as:  DURAGESIC  Place 1 patch onto the skin every 72 hours.     oxyCODONE 10 mg Tab immediate release tablet  Commonly known as:  ROXICODONE  Take 1 tablet (10 mg total) by mouth every 4 (four) hours as needed.        CHANGE how you take these medications    pantoprazole 40 MG tablet  Commonly known as:  PROTONIX  Take 40 mg by mouth once daily.  What changed:  Another medication with the same name was removed. Continue taking this medication, and follow the directions you see here.     PROLIA 60 mg/mL Syrg  Generic drug:  denosumab  Inject 60 mg into the skin.  What changed:  Another medication with the same name was removed. Continue taking this medication, and follow the directions you see here.        CONTINUE taking these medications    acetaminophen 650 MG Tbsr  Commonly known as:  TYLENOL  Take 1,000 mg by mouth 3 (three) times daily.     aspirin 81 MG EC tablet  Commonly known as:  ECOTRIN  Take 81 mg by mouth once daily.     b complex vitamins capsule  Take 1 capsule by mouth once daily.     buPROPion 150 MG TB24 tablet  Commonly known as:  WELLBUTRIN XL  Take 150 mg by mouth once daily.     calcium carbonate 1250 MG capsule  Take 1,250 mg by mouth 2 (two) times daily with meals.     celecoxib 200 MG capsule  Commonly known as:  CeleBREX  TK 1 C PO QAM     CENTRUM COMPLETE ORAL  Take by mouth.     cholecalciferol (vitamin D3) 10 mcg/mL (400 unit/mL) Drop  Commonly known as:  VITAMIN D3  Take by mouth.     cycloSPORINE 0.05 % ophthalmic  emulsion  Commonly known as:  RESTASIS  1 drop 2 (two) times daily.     gabapentin 300 MG capsule  Commonly known as:  NEURONTIN  Take 300 mg by mouth 3 (three) times daily.     levothyroxine 50 MCG tablet  Commonly known as:  SYNTHROID  Take 50 mcg by mouth once daily.     lipase-protease-amylase 24,000-76,000-120,000 units 24,000-76,000 -120,000 unit capsule  Commonly known as:  CREON  Take 1 capsule by mouth 3 (three) times daily with meals.     * ondansetron 8 MG Tbdl  Commonly known as:  ZOFRAN-ODT  Take 8 mg by mouth once.     * ondansetron 4 MG Tbdl  Commonly known as:  ZOFRAN-ODT  DISSOLVE 1 TABLET UNDER TONGUE EVERY 6 HOURS AS NEEDED FOR NAUSEA AND VOMITING     prochlorperazine 10 MG tablet  Commonly known as:  COMPAZINE  Take 10 mg by mouth every 6 (six) hours as needed.     sertraline 25 MG tablet  Commonly known as:  ZOLOFT  Take 25 mg by mouth once daily.     traMADoL 50 mg tablet  Commonly known as:  ULTRAM  Take 50 mg by mouth every 6 (six) hours as needed for Pain.     TURMERIC ORAL  Take by mouth.     VITAMIN D2 50,000 unit Cap  Generic drug:  ergocalciferol  Take 8,000 Units by mouth every 7 days.         * This list has 2 medication(s) that are the same as other medications prescribed for you. Read the directions carefully, and ask your doctor or other care provider to review them with you.            STOP taking these medications    HYDROcodone-acetaminophen 7.5-325 mg per tablet  Commonly known as:  NORCO     methocarbamoL 500 MG Tab  Commonly known as:  ROBAXIN     OLANZapine 5 MG tablet  Commonly known as:  ZyPREXA     oxyCODONE-acetaminophen  mg per tablet  Commonly known as:  PERCOCET     tiZANidine 4 MG tablet  Commonly known as:  ZANAFLEX            Kate Gabriel NP  General Surgery  [facility]

## 2020-03-26 NOTE — PLAN OF CARE
Problem: SLP Goal  Goal: SLP Goal  Description  Speech Language Pathology Goals  Goals expected to be met by 3/30/2020  1.  Pt will tolerate a regular consistency diet with thin liquids w/o overt S/S aspiration.   2. Pt will recall at least three safe swallow strategies I'ly,   3. Educate Pt and caregivers on S/S aspiration and aspiration precautions     Outcome: Ongoing, Progressing     SLP recommending a diet advancement to regular consistencies at this time.   Emily P. Abadie M.S., CCC-SLP  Speech Language Pathologist  [phone]  03/26/2020

## 2020-03-26 NOTE — PLAN OF CARE
03/26/20 1053   Post-Acute Status   Post-Acute Authorization Home [facility]   Home Health Status Referrals Sent   8:45 AM  SW spoke with Darrell at [facility] ([phone]). She stated she spoke with pt's  and he reported he wasn't sure about hospice and wanted to speak with their daughter.SW spoke with Phylicia at [facility] ([phone] ). She stated pt will be able to transition to their hospice when family is ready. She stated the referral was coming through the fax and they are going to review and give SW call back.       UPDATE:11:32 AM  Pt's  wanting pt to transfer via stretcher and willing to pay bill. SW arranged stretcher transport via [facility]. Requested  time is 12:30 PM. Requested  time does not guarantee arrival time. Ambulance packet sent to nurse's station. EMS notified to call pt's , Angel ([phone]).     UPDATE:2:02 PM  SW spoke with [facility]. They will be able to admit pt tomorrow.     Jaimee Negro LMSW  [facility]  [phone]

## 2020-03-26 NOTE — PLAN OF CARE
Problem: Physical Therapy Goal  Goal: Physical Therapy Goal  Description  Goals to be met by: 20    Patient will increase functional independence with mobility by performin. Supine to sit with Stand-by Assistance -met  2. Sit to stand transfer with Supervision -not met  3. Gait  x 100 feet with Contact Guard Assistance using AD if needed.. -not met  4. Ascend/descend 4 stair with no Handrails Contact Guard Assistance . -not met        Outcome: Ongoing, Progressing   Kuldeep Murrieta, PT,DPT  3/26/2020

## 2020-03-26 NOTE — SUBJECTIVE & OBJECTIVE
Interval History: No acute events overnight, afebrile since yesterday morning. Reports feeling ok. T bili 1.8 from 1.5 yesterday. G tube with 600 out yesterday.     Medications:  Continuous Infusions:   TPN ADULT CENTRAL LINE CUSTOM 75 mL/hr at 03/25/20 2340     Scheduled Meds:   ampicillin-sulbactim (UNASYN) IVPB  3 g Intravenous Q8H    enoxaparin  40 mg Subcutaneous Daily    fat emulsion 20%  250 mL Intravenous Daily    fentaNYL  1 patch Transdermal Q72H    levothyroxine  50 mcg Oral Before breakfast    pantoprazole  40 mg Oral BID AC     PRN Meds:acetaminophen, Dextrose 10% Bolus, Dextrose 10% Bolus, diphenhydrAMINE, HYDROmorphone, influenza, levalbuterol, naloxone, ondansetron, oxyCODONE, oxyCODONE, prochlorperazine, sodium chloride 0.9%     Review of patient's allergies indicates:   Allergen Reactions    Azithromycin Other (See Comments)     Stomach pain     Codeine Hallucinations     Objective:     Vital Signs (Most Recent):  Temp: 98.6 °F (37 °C) (03/26/20 0350)  Pulse: 80 (03/26/20 0350)  Resp: 16 (03/26/20 0350)  BP: (!) 103/59 (03/26/20 0350)  SpO2: 100 % (03/26/20 0350) Vital Signs (24h Range):  Temp:  [97.4 °F (36.3 °C)-101.3 °F (38.5 °C)] 98.6 °F (37 °C)  Pulse:  [] 80  Resp:  [12-20] 16  SpO2:  [95 %-100 %] 100 %  BP: ()/(52-61) 103/59     Weight: 47 kg (103 lb 9.9 oz)  Body mass index is 18.95 kg/m².    Intake/Output - Last 3 Shifts       03/24 0700 - 03/25 0659 03/25 0700 - 03/26 0659 03/26 0700 - 03/27 0659    P.O. 800 340     I.V. (mL/kg)  0 (0)     IV Piggyback 400 700           Total Intake(mL/kg) 1313 (27.9) 1040 (22.1)     Urine (mL/kg/hr) 2000 (1.8) 1275 (1.1)     Emesis/NG output  0     Drains 675 600     Other  0     Stool 0 0     Blood  0     Total Output 2675 7665     Net -0326 -366            Urine Occurrence  1 x     Stool Occurrence 0 x 0 x     Emesis Occurrence  0 x           Physical Exam   Constitutional: She is oriented to person, place, and time.   Lying  in hospital bed comfortably  Frail  Cachectic  No distress  No diaphoresis   HENT:   Head: Normocephalic and atraumatic.   Eyes: EOM are normal.   Neck: Normal range of motion.   Cardiovascular: Normal rate, regular rhythm and intact distal pulses.   Port-A-Cath in place to right chest   Pulmonary/Chest: Effort normal. No respiratory distress. She has no wheezes.   Abdominal:   Soft  Non-distended  Appropriate surgical site tenderness  Midline incision well-approximated without signs of surgical site infection  G tube in place to L abdomen   Musculoskeletal: She exhibits no edema or deformity.   Neurological: She is alert and oriented to person, place, and time.   Skin: Skin is warm and dry. No rash noted. No erythema.   Psychiatric: She has a normal mood and affect. Her behavior is normal.   Nursing note and vitals reviewed.      Significant Labs:  CBC:   Recent Labs   Lab 03/26/20  0321   WBC 5.98   RBC 2.31*   HGB 7.2*   HCT 22.4*      MCV 97   MCH 31.2*   MCHC 32.1     CMP:   Recent Labs   Lab 03/26/20  0321   *   CALCIUM 8.1*   ALBUMIN 1.7*   PROT 5.5*   *   K 4.0   CO2 22*   CL 98   BUN 15   CREATININE 0.6   ALKPHOS 922*   ALT 49*   AST 83*   BILITOT 1.8*       Significant Diagnostics:  I have reviewed and interpreted all pertinent imaging results/findings within the past 24 hours.

## 2020-03-26 NOTE — PROGRESS NOTES
"[facility]  Adult Nutrition  Progress Note    SUMMARY       Recommendations    Pt meets severe malnutrition criteria.      1. Continue Mechanical Soft diet as tolerated.       2. Recommend adding Boost Plus with meals.      3. Current TPN exceeding needs.    - To better meet needs, recommend Custom TPN 75 gm AA / 250 gm Dex + IV lipids to provide 1650 kcal, 75 gm protein, and GIR of 3.69.    - As po intake increases, D/C lipids and wean TPN.      4. RD following.     Goals: Meet % EEN, EPN by RD f/u date  Nutrition Goal Status: (exceeding)  Communication of RD Recs: (POC)    Reason for Assessment    Reason For Assessment: RD follow-up  Diagnosis: other (see comments)(Pneumonperitoneum, pancreatic adencarcinoma)  Interdisciplinary Rounds: did not attend  General Information Comments: Remote access, pt with no phone at this time. TPN + IV lipids infusing. Spoke with RN via phone. Reports that pt is picking at meals and might drink ONS. NFPE completed 3/15. Pt continues to meet severe malnutrition criteria at this time.  Nutrition Discharge Planning: unable to determine at this time    Nutrition Risk Screen    Nutrition Risk Screen: no indicators present    Nutrition/Diet History    Patient Reported Diet/Restrictions/Preferences: general  Spiritual, Cultural Beliefs, Jain Practices, Values that Affect Care: no  Factors Affecting Nutritional Intake: altered gastrointestinal function, decreased appetite    Anthropometrics    Temp: 97.3 °F (36.3 °C)  Height Method: Stated  Height: 5' 2" (157.5 cm)  Height (inches): 62 in  Weight Method: Bed Scale  Weight: 47 kg (103 lb 9.9 oz)  Weight (lb): 103.62 lb  Ideal Body Weight (IBW), Female: 110 lb  % Ideal Body Weight, Female (lb): 94.2 %  BMI (Calculated): 18.9  BMI Grade: 18.5-24.9 - normal  Usual Body Weight (UBW), k.8 kg  % Usual Body Weight: 82.92  % Weight Change From Usual Weight: -17.25 %     Lab/Procedures/Meds    Pertinent Labs " Reviewed: reviewed  Pertinent Labs Comments: Na 128, glucose 151, alk phos 922, T bili 1.8, AST 83, ALT 49  Pertinent Medications Reviewed: reviewed  Pertinent Medications Comments: levothyroxine, pantoprazole    Estimated/Assessed Needs    Weight Used For Calorie Calculations: 47 kg (103 lb 9.9 oz)  Energy Calorie Requirements (kcal): 7589-8049 kcal/d  Energy Need Method: Kcal/kg(30-35 kcal/kg)  Protein Requirements: 57-71 g/d (1.2-1.5 g/kg)  Weight Used For Protein Calculations: 47 kg (103 lb 9.9 oz)     Estimated Fluid Requirement Method: other (see comments)(Per MD or 1 mL/kcal)  RDA Method (mL): 1410     Nutrition Prescription Ordered    Current Diet Order: Mechanical Soft  Current Nutrition Support Formula Ordered: (Custom TPN 90 gm AA / 270 gm Dex + IV lipids)  Current Nutrition Support Rate Ordered: 75 (ml)  Current Nutrition Support Frequency Ordered: mL/hr    Evaluation of Received Nutrient/Fluid Intake    Parenteral Calories (kcal): 1278  Parenteral Protein (gm): 90  Parenteral Fluid (mL): 1800  Lipid Calories (kcals): 500 kcals  GIR (Glucose Infusion Rate) (mg/kg/min): 3.99 mg/kg/min  Total Calories (kcal): 1778  % Kcal Needs: 108  % Protein Needs: 127  I/O: -12.464L since 3/21  Energy Calories Required: exceeds needs  Protein Required: exceeds needs  Fluid Required: (per MD)  Comments: LBM recorded 3/21  Tolerance: tolerating  % Intake of Estimated Energy Needs: Other: >108%  % Meal Intake: 0 - 25 %    Nutrition Risk    Level of Risk/Frequency of Follow-up: (f/u 1 x wk)     Assessment and Plan    Severe protein-calorie malnutrition    Nutrition Problem:  Severe Protein-Calorie Malnutrition  Malnutrition in the context of Chronic Illness/Injury    Related to (etiology):  Inability to consume sufficient energy    Signs and Symptoms (as evidenced by):  Energy Intake: <75% of estimated energy requirement for > 3 months  Body Fat Depletion: severe depletion of orbitals, triceps and thoracic and lumbar region    Muscle Mass Depletion: severe depletion of temples, clavicle region, scapular region and interosseous muscle   Weight Loss: 17% x 6 months     Interventions (treatment strategy):  Collaboration of nutrition care w/ other providers  Parenteral Nutrition     Nutrition Diagnosis Status:  Continues              Monitor and Evaluation    Food and Nutrient Intake: energy intake, food and beverage intake, parenteral nutrition intake  Food and Nutrient Adminstration: diet order, enteral and parenteral nutrition administration  Physical Activity and Function: nutrition-related ADLs and IADLs  Anthropometric Measurements: weight, weight change, body mass index  Biochemical Data, Medical Tests and Procedures: electrolyte and renal panel, gastrointestinal profile, glucose/endocrine profile, inflammatory profile, lipid profile  Nutrition-Focused Physical Findings: overall appearance     Malnutrition Assessment             Weight Loss (Malnutrition): greater than 10% in 6 months  Energy Intake (Malnutrition): less than 75% for greater than or equal to 3 months  Subcutaneous Fat (Malnutrition): severe depletion  Muscle Mass (Malnutrition): severe depletion   Orbital Region (Subcutaneous Fat Loss): severe depletion  Upper Arm Region (Subcutaneous Fat Loss): severe depletion  Thoracic and Lumbar Region: severe depletion   [place] Region (Muscle Loss): severe depletion  Clavicle Bone Region (Muscle Loss): severe depletion  Scapular Bone Region (Muscle Loss): severe depletion  Dorsal Hand (Muscle Loss): severe depletion  Anterior Thigh Region (Muscle Loss): other (see comments)(ZAIRE)  Posterior Calf Region (Muscle Loss): other (see comments)(ZAIRE)                 Nutrition Follow-Up    RD Follow-up?: Yes

## 2020-03-26 NOTE — PT/OT/SLP PROGRESS
"Physical Therapy Treatment    Patient Name:  Julee Hawkins   MRN:  [id number]    Recommendations:     Discharge Recommendations:  nursing facility, skilled   Discharge Equipment Recommendations: bedside commode, bath bench, walker, rolling, hospital bed   Barriers to discharge: decreased functional mobility    Assessment:     Julee Hawkins is a 74 y.o. female admitted with a medical diagnosis of Pneumoperitoneum.  She presents with the following impairments/functional limitations:  weakness, impaired self care skills, impaired endurance, impaired functional mobilty, gait instability, pain, impaired cardiopulmonary response to activity.  Tolerated session c c/o fatigue and pain.  Participation limited on this date as pt wanting to conserve energy for d/c scheduled later in day.  Performed mobility c SBA-CGA.  Pt able to amb short distance in room c HHAx1 and demo decreased gait speed, flat foot contact and FFP.  Pt safe to amb c assistance of 1x person. Pt would benefit from continued skilled acute PT 4x/wk to improve functional mobility.      Rehab Prognosis: Good; patient would benefit from acute skilled PT services to address these deficits and reach maximum level of function.    Recent Surgery: Procedure(s) (LRB):  LAPAROTOMY, EXPLORATORY (N/A)  CLOSURE, ULCER, PERFORATED, DUODENUM, USING OMENTAL PATCH  BIOPSY, LIVER  INSERTION, PEG TUBE 16 Days Post-Op    Plan:     During this hospitalization, patient to be seen 4 x/week to address the identified rehab impairments via gait training, therapeutic activities, therapeutic exercises and progress toward the following goals:    · Plan of Care Expires:  04/09/20    Subjective     Chief Complaint: fatigue, pain  Patient/Family Comments/goals: "I finally get to go home today."  Pain/Comfort:  · Pain Rating 1: (reports continued generalized pain)      Objective:     Communicated with RN and OT prior to session.  Patient found on BSC with peripheral IV, " telemetry, nephrostomy upon PT entry to room.     General Precautions: Standard, aspiration, fall   Orthopedic Precautions:N/A   Braces: N/A     Functional Mobility:  · Bed Mobility:     · Rolling Left:  modified independence  · Rolling Right: modified independence  · Scooting: stand by assistance  · Sit to Supine: contact guard assistance  · Transfers:     · Sit to Stand:  contact guard assistance with no AD  · Chair to bed: contact guard assistance with  hand-held assist  using  Step Transfer  · Gait: 10ft c HHAx1 CGA    · demo decreased gait speed, flat foot contact and FFP  · Balance: standing (SBA-CGA)      AM-PAC 6 CLICK MOBILITY  Turning over in bed (including adjusting bedclothes, sheets and blankets)?: 4  Sitting down on and standing up from a chair with arms (e.g., wheelchair, bedside commode, etc.): 3  Moving from lying on back to sitting on the side of the bed?: 3  Moving to and from a bed to a chair (including a wheelchair)?: 3  Need to walk in hospital room?: 3  Climbing 3-5 steps with a railing?: 2  Basic Mobility Total Score: 18       Therapeutic Activities and Exercises:  Pt educated on: PT role/POC; safety c mobility; benefits of OOB activities; performing therex; d/c recs - v/u  -pt able to perform mimi-care without assistance    Patient left HOB elevated with all lines intact, call button in reach and RN notified..    GOALS:   Multidisciplinary Problems     Physical Therapy Goals        Problem: Physical Therapy Goal    Goal Priority Disciplines Outcome Goal Variances Interventions   Physical Therapy Goal     PT, PT/OT Ongoing, Progressing     Description:  Goals to be met by: 20    Patient will increase functional independence with mobility by performin. Supine to sit with Stand-by Assistance -met  2. Sit to stand transfer with Supervision -not met  3. Gait  x 100 feet with Contact Guard Assistance using AD if needed.. -not met  4. Ascend/descend 4 stair with no Handrails Contact  Guard Assistance . -not met                         Time Tracking:     PT Received On: 03/26/20  PT Start Time: 1038     PT Stop Time: 1050  PT Total Time (min): 12 min     Billable Minutes: Therapeutic Activity 12 min    Treatment Type: Treatment  PT/PTA: PT     PTA Visit Number: 0     Kuldeep Murrieta, PT  03/26/2020

## 2020-03-26 NOTE — PROGRESS NOTES
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  Pt has been having abdominal pain since Saturday.  Transferred after CT findings.  Reportedly had been responding to neoadjuvant chemo based on last PET    Post-Op Info:  Procedure(s) (LRB):  LAPAROTOMY, EXPLORATORY (N/A)  CLOSURE, ULCER, PERFORATED, DUODENUM, USING OMENTAL PATCH  BIOPSY, LIVER  INSERTION, PEG TUBE   16 Days Post-Op     Interval History: No acute events overnight, afebrile since yesterday morning. Reports feeling ok. T bili 1.8 from 1.5 yesterday. G tube with 600 out yesterday.     Medications:  Continuous Infusions:   TPN ADULT CENTRAL LINE CUSTOM 75 mL/hr at 03/25/20 2340     Scheduled Meds:   ampicillin-sulbactim (UNASYN) IVPB  3 g Intravenous Q8H    enoxaparin  40 mg Subcutaneous Daily    fat emulsion 20%  250 mL Intravenous Daily    fentaNYL  1 patch Transdermal Q72H    levothyroxine  50 mcg Oral Before breakfast    pantoprazole  40 mg Oral BID AC     PRN Meds:acetaminophen, Dextrose 10% Bolus, Dextrose 10% Bolus, diphenhydrAMINE, HYDROmorphone, influenza, levalbuterol, naloxone, ondansetron, oxyCODONE, oxyCODONE, prochlorperazine, sodium chloride 0.9%     Review of patient's allergies indicates:   Allergen Reactions    Azithromycin Other (See Comments)     Stomach pain     Codeine Hallucinations     Objective:     Vital Signs (Most Recent):  Temp: 98.6 °F (37 °C) (03/26/20 0350)  Pulse: 80 (03/26/20 0350)  Resp: 16 (03/26/20 0350)  BP: (!) 103/59 (03/26/20 0350)  SpO2: 100 % (03/26/20 0350) Vital Signs (24h Range):  Temp:  [97.4 °F (36.3 °C)-101.3 °F (38.5 °C)] 98.6 °F (37 °C)  Pulse:  [] 80  Resp:  [12-20] 16  SpO2:  [95 %-100 %] 100 %  BP: ()/(52-61) 103/59      Weight: 47 kg (103 lb 9.9 oz)  Body mass index is 18.95 kg/m².    Intake/Output - Last 3 Shifts       03/24 0700 - 03/25 0659 03/25 0700 - 03/26 0659 03/26 0700 - 03/27 0659    P.O. 800 340     I.V. (mL/kg)  0 (0)     IV Piggyback 400 700           Total Intake(mL/kg) 1313 (27.9) 1040 (22.1)     Urine (mL/kg/hr) 2000 (1.8) 1275 (1.1)     Emesis/NG output  0     Drains 675 600     Other  0     Stool 0 0     Blood  0     Total Output 2675 1875     Net -1362 -835            Urine Occurrence  1 x     Stool Occurrence 0 x 0 x     Emesis Occurrence  0 x           Physical Exam   Constitutional: She is oriented to person, place, and time.   Lying in hospital bed comfortably  Frail  Cachectic  No distress  No diaphoresis   HENT:   Head: Normocephalic and atraumatic.   Eyes: EOM are normal.   Neck: Normal range of motion.   Cardiovascular: Normal rate, regular rhythm and intact distal pulses.   Port-A-Cath in place to right chest   Pulmonary/Chest: Effort normal. No respiratory distress. She has no wheezes.   Abdominal:   Soft  Non-distended  Appropriate surgical site tenderness  Midline incision well-approximated without signs of surgical site infection  G tube in place to L abdomen   Musculoskeletal: She exhibits no edema or deformity.   Neurological: She is alert and oriented to person, place, and time.   Skin: Skin is warm and dry. No rash noted. No erythema.   Psychiatric: She has a normal mood and affect. Her behavior is normal.   Nursing note and vitals reviewed.      Significant Labs:  CBC:   Recent Labs   Lab 03/26/20  0321   WBC 5.98   RBC 2.31*   HGB 7.2*   HCT 22.4*      MCV 97   MCH 31.2*   MCHC 32.1     CMP:   Recent Labs   Lab 03/26/20  0321   *   CALCIUM 8.1*   ALBUMIN 1.7*   PROT 5.5*   *   K 4.0   CO2 22*   CL 98   BUN 15   CREATININE 0.6   ALKPHOS 922*   ALT 49*   AST 83*   BILITOT 1.8*       Significant Diagnostics:  I have reviewed and interpreted all pertinent imaging  results/findings within the past 24 hours.    Assessment/Plan:     * Pneumoperitoneum  73 y/o female with pancreatic cancer with plans to undergo Whipple with right nephrectomy and caval reconstruction with Dr. Wall. Transferred in with pneumoperitoneum. S/p exlap with repair of duodenal perforation and peritoneal biopsies on 3/10. UGI on 3/14 demonstrating no evidence of leak.     - ID consulted, now signed off  - Continue Abx for now   - COVID testing negative   - Dysphagia diet  - Continue TPN  - Clamp G-tube  - Unclamp for nausea or vomiting not controlled by anti-emetics  - Anti-emetics PRN  - PT/OT  - Encourage OOB, ambulation   - Pulmonary hygiene - IS, ACAPELLA, deep breathing  - Mechanical and chemical DVT prophylaxis  - Replace electrolytes PRN  - Plan for d/c home with palliative care when afebrile for 24hrs        Xochitl Salazar MD  General Surgery  [facility]

## 2020-03-26 NOTE — PT/OT/SLP PROGRESS
Occupational Therapy   Treatment    Name: Julee Hawkins  MRN: [id number]  Admitting Diagnosis:  Pneumoperitoneum  16 Days Post-Op    Recommendations:     Discharge Recommendations: nursing facility, skilled  Discharge Equipment Recommendations:  bedside commode, bath bench, walker, rolling, hospital bed  Barriers to discharge:  None    Assessment:     Julee Hawkins is a 74 y.o. female with a medical diagnosis of Pneumoperitoneum.  She presents with good effort this day and tolerates session well. Performance deficits affecting function are weakness, impaired endurance, impaired self care skills, impaired functional mobilty, gait instability, impaired cardiopulmonary response to activity, impaired balance.     Rehab Prognosis:  Good; patient would benefit from acute skilled OT services to address these deficits and reach maximum level of function.       Plan:     Patient to be seen 5 x/week to address the above listed problems via self-care/home management, therapeutic activities, therapeutic exercises  · Plan of Care Expires: 04/11/20  · Plan of Care Reviewed with: patient    Subjective     Pain/Comfort:  · Pain Rating 1: other (see comments)(Did not rate)    Objective:     Communicated with: RN prior to session.  Patient found HOB elevated with peripheral IV, telemetry, nephrostomy upon OT entry to room.    General Precautions: Standard, aspiration, fall   Orthopedic Precautions:N/A   Braces: N/A     Occupational Performance:     Bed Mobility:    · Patient completed Scooting anteriorly to EOB for foot placement on floor with stand by assistance  · Patient completed Supine to Sit to L side EOB with minimum assistance for trunk advancement off bed  · Patient completed Sit to Supine with contact guard assistance     Functional Mobility/Transfers:  · Patient completed Sit <> Stand Transfer with contact guard assistance  with  rolling walker   · Functional Mobility: Patient completed functional mobility  within room ~30' with SBA and RW. No LOB/SOB noted but with decreased gait speed.    Activities of Daily Living:  · Grooming: stand by assistance Patient participated in oral hygiene, hand hygiene, and face wash with a washcloth while standing at the sink in the restroom with SBA.      [facility] 6 Click ADL: 21    Treatment & Education:  Role of OT/POC  Call button for assistance    Patient left HOB elevated with all lines intact, call button in reach and RN notifiedEducation:      GOALS:   Multidisciplinary Problems     Occupational Therapy Goals     Not on file          Multidisciplinary Problems (Resolved)        Problem: Occupational Therapy Goal    Goal Priority Disciplines Outcome Interventions   Occupational Therapy Goal   (Resolved)     OT, PT/OT Met    Description:  Goals to be met by: 4/3/2020     Patient will increase functional independence with ADLs by performing:    UE Dressing with Stand-by Assistance.  LE Dressing with Stand-by Assistance.  Grooming while standing at sink with Stand-by Assistance.  Toileting from toilet with Stand-by Assistance for hygiene and clothing management.   Upper extremity exercise program x10 reps per handout, with supervision.  Pt will perform functional mobility task of household and community distance with SBA using AD as needed.                     Time Tracking:     OT Date of Treatment: 03/26/20  OT Start Time: 1143  OT Stop Time: 1208  OT Total Time (min): 25 min    Billable Minutes:Self Care/Home Management 25 minutes    Conchis Severino OT  3/26/2020

## 2020-03-26 NOTE — PLAN OF CARE
POC reviewed w/ patient.  Patient verbalized understanding.  AAOx4, vss on room air.  Dysphagia soft diet. G tube unclamped once during shift for nausea.  No output.  Right nephrostomy tube intact w/ clear, yellow urine.  Pain minimally controlled with around the clock prn meds.  Up to the bedside commode w/ 1 assist.  No acute events this shift.  Will continue to monitor patient.

## 2020-03-26 NOTE — PT/OT/SLP PROGRESS
Speech Language Pathology Treatment    Patient Name:  Julee Hawkins   MRN:  [id number]  Admitting Diagnosis: Pneumoperitoneum    Recommendations:                 General Recommendations:  follow up diet tolerance  Diet recommendations:  Mechanical soft, Liquid Diet Level: Thin   Aspiration Precautions: Standard aspiration precautions   General Precautions: Standard, aspiration, fall  Communication strategies:  none    Subjective     Patient awake;alert. Reports poor PO intake given distaste.     Pain/Comfort:  · Pain Rating 1: 0/10    Objective:     Has the patient been evaluated by SLP for swallowing?   Yes  Keep patient NPO? No   Current Respiratory Status: room air      Patient seen for ongoing swallow assessment. Patient tolerated thin liquids via straw sips over 4oz along with regular solids via 1 whole cracker with no overt signs of airway compromise.  Min-mild prolonged mastication, though overall oral phase of swallow WFL. Patient appearing appropriate for diet advancement to regular consistency solids.  Skilled education was provided to patient  re: diet recs, standard aspiration precautions of which to follow, and ongoing ST plan of care. Patient verbalized understanding.     Assessment:     Julee Hawkins is a 74 y.o. female with an SLP diagnosis of resolving dysphagia.      Goals:   Multidisciplinary Problems     SLP Goals        Problem: SLP Goal    Goal Priority Disciplines Outcome   SLP Goal     SLP Ongoing, Progressing   Description:  Speech Language Pathology Goals  Goals expected to be met by 3/30/2020  1.  Pt will tolerate a regular consistency diet with thin liquids w/o overt S/S aspiration.   2. Pt will recall at least three safe swallow strategies I'ly,   3. Educate Pt and caregivers on S/S aspiration and aspiration precautions                          Plan:     · Patient to be seen:  4 x/week   · Plan of Care expires:  04/22/20  · Plan of Care reviewed with:  patient   · SLP  Follow-Up:  Yes       Discharge recommendations:  nursing facility, skilled   Barriers to Discharge:  None    Time Tracking:     SLP Treatment Date:   03/26/20  Speech Start Time:  0950  Speech Stop Time:  1006     Speech Total Time (min):  16 min    Billable Minutes: Treatment Swallowing Dysfunction 8 and Seld Care/Home Management Training 8    Emily Abadie, CCC-SLP  03/26/2020

## 2020-03-26 NOTE — PLAN OF CARE
Problem: Malnutrition  Goal: Improved Nutritional Intake  Outcome: Ongoing, Progressing     Problem: Oral Intake Inadequate  Goal: Improved Oral Intake  Outcome: Ongoing, Progressing     Problem: Parenteral Nutrition  Goal: Effective Intravenous Nutrition Therapy Delivery  Outcome: Ongoing, Progressing          Recommendations    Pt meets severe malnutrition criteria.      1. Continue Mechanical Soft diet as tolerated.       2. Recommend adding Boost Plus with meals.      3. Current TPN exceeding needs.    - To better meet needs, recommend Custom TPN 75 gm AA / 250 gm Dex + IV lipids to provide 1650 kcal, 75 gm protein, and GIR of 3.69.    - As po intake increases, D/C lipids and wean TPN.      4. RD following.     Goals: Meet % EEN, EPN by RD f/u date  Nutrition Goal Status: (exceeding)

## 2020-03-26 NOTE — ASSESSMENT & PLAN NOTE
Nutrition Problem:  Severe Protein-Calorie Malnutrition  Malnutrition in the context of Chronic Illness/Injury    Related to (etiology):  Inability to consume sufficient energy    Signs and Symptoms (as evidenced by):  Energy Intake: <75% of estimated energy requirement for > 3 months  Body Fat Depletion: severe depletion of orbitals, triceps and thoracic and lumbar region   Muscle Mass Depletion: severe depletion of temples, clavicle region, scapular region and interosseous muscle   Weight Loss: 17% x 6 months     Interventions (treatment strategy):  Collaboration of nutrition care w/ other providers  Parenteral Nutrition     Nutrition Diagnosis Status:  Continues

## 2020-03-26 NOTE — ASSESSMENT & PLAN NOTE
75 y/o female with pancreatic cancer with plans to undergo Whipple with right nephrectomy and caval reconstruction with Dr. Wall. Transferred in with pneumoperitoneum. S/p exlap with repair of duodenal perforation and peritoneal biopsies on 3/10. UGI on 3/14 demonstrating no evidence of leak.     - ID consulted, now signed off  - Continue Abx for now   - COVID testing negative   - Dysphagia diet  - Continue TPN  - Clamp G-tube  - Unclamp for nausea or vomiting not controlled by anti-emetics  - Anti-emetics PRN  - PT/OT  - Encourage OOB, ambulation   - Pulmonary hygiene - IS, ACAPELLA, deep breathing  - Mechanical and chemical DVT prophylaxis  - Replace electrolytes PRN  - Plan for d/c home with palliative care when afebrile for 24hrs

## 2020-03-27 NOTE — PLAN OF CARE
Patient discharged home 3/26/2020 with [facility].         03/27/20 0730   Final Note   Assessment Type Final Discharge Note   Anticipated Discharge Disposition Home-Health   Right Care Referral Info   Post Acute Recommendation Home-care   Post-Acute Status   Post-Acute Authorization Home Health   Home Health Status Set-up Complete

## 2020-03-27 NOTE — PHYSICIAN QUERY
PT Name: Julee Hawkins  MR #: [id number]     Physician Query Form - Diagnosis Clarification      CDS: Melani KOCH,RN        Contact information:[phone]    This form is a permanent document in the medical record.     Query Date: March 26, 2020    By submitting this query, we are merely seeking further clarification of documentation.  Please utilize your independent clinical judgment when addressing the question(s) below.     The medical record contains the following:      Findings Supporting Clinical Information Location in Medical Record                             Sepsis She presented to the outside facility with abdominal pain and jaundice skin.  She was found to have peritoneal signs on physical exam.  Labs remarkable for a leukocytosis and elevated bilirubin.  CT scan was remarkable for pneumoperitoneum and pneumobilia.   Differential Diagnosis:    Small bowel perforation, large bowel perforation, stent migration, bowel ischemia, sepsis  Pneumoperitoneum  Pneumobilia  Peritonitis    I did discuss the natural progression of abdominal sepsis from a hollow viscus injury.    WBC=17.25-->16.14-->5.89-->14.04-->6.51-->5.98  Lactate, venous=0.6  Acetaminophen tablet 1,000 mg Oral Every 8 hours  Ampicillin sulbactam 3 g in sodium chloride 0.9% 100 ml IVPB Intravenous Every 8 hours 14 doses given   Piperacillin-tazobactam 4.5 g in sodium chloride 0.9% 100 ml IVPB Intravenous Every 8 hours   Piperacillin-tazobactam 4.5 g in sodium chloride 0.9% 100 ml IVPB Every 8 hours   Sodium chloride 0.9% bolus 1,000 ml Intravenous Once 1 dose given  Sodium chloride 0.9% bolus 1,500 ml Intravenous Once 1 dose given  Sodium chloride 0.9% bolus 500 ml Intravenous Once 1 dose given  Sodium chloride 0.9% bolus 500 ml Intravenous Once 1 dose given  Vancomycin in dextrose 5% 1 gram/250 ml IVPB Intravenous Once 1 dose given       3/10/20 ED Provider Notes                  3/10/20 General Surgery  Consults      3/10---->3/26 Labs  3/10/20 Lab  3/16---->3/20 Labs  3/21--->3/26 MAR    3/10----->3/14 MAR    3/19------->3/20 MAR    3/11/20 MAR    3/10/20 MAR    3/11/20 MAR      3/25/20 MAR    3/10/20 MAR     Please clarify if the ___Sepsis________________________ diagnosis has been:    [ xx ] Ruled In   [  ] Ruled Out   [  ] Other/Clarification of findings (please specify):     [  ] Clinically undetermined     Please document in your progress notes daily for the duration of treatment, until resolved, and include in your discharge summary.

## 2020-03-30 LAB
BACTERIA BLD CULT: NORMAL
BACTERIA BLD CULT: NORMAL
BACTERIA FLD CULT: NORMAL

## 2023-02-18 NOTE — TELEPHONE ENCOUNTER
----- Message from Tania Vogt sent at 7/12/2019  1:53 PM CDT -----  Contact: Pt.Self   Patient Requesting Sooner Appointment.     Reason for sooner appt.:  Franko/pancreas/disc    When is the first available appointment?  Schedule not available @ time of call     Communication Preference:  [phone]    Additional Information:    Thank You   
Appointment made and confirmed.  To bring CT on disc  
No

## (undated) DEVICE — CATH 5FR OPEN END URETERAL

## (undated) DEVICE — SOL IRR WATER STRL 3000 ML

## (undated) DEVICE — NDL 18GA X1 1/2 REG BEVEL

## (undated) DEVICE — GLOVE BIOGEL 7.5

## (undated) DEVICE — SYR 50CC LL

## (undated) DEVICE — SOL IRR NACL .9% 3000ML

## (undated) DEVICE — BLADE 4 INCH EDGE UN-INS

## (undated) DEVICE — TRAY FOLEY 16FR INFECTION CONT

## (undated) DEVICE — CONTAINER SPECIMEN STRL 4OZ

## (undated) DEVICE — KIT SAHARA DRAPE DRAW/LIFT

## (undated) DEVICE — SYR ONLY LUER LOCK 20CC

## (undated) DEVICE — DRAPE INCISE IOBAN 2 23X17IN

## (undated) DEVICE — DRAPE INCISE IOBAN 2 23X33IN

## (undated) DEVICE — SYR 10CC LUER LOCK

## (undated) DEVICE — SUT 1 48IN PDS II VIO MONO

## (undated) DEVICE — SEE MEDLINE ITEM 156902

## (undated) DEVICE — CATH URET OPEN END 4.8X8F 70CM

## (undated) DEVICE — SEE MEDLINE ITEM 146417

## (undated) DEVICE — SUT SILK 3-0 STRANDS 30IN

## (undated) DEVICE — SUT SILK 2-0 STRANDS 30IN

## (undated) DEVICE — TRAY CYSTO BASIN

## (undated) DEVICE — SET DECANTER MEDICHOICE

## (undated) DEVICE — SUT 3-0 12-18IN SILK

## (undated) DEVICE — GOWN X-LG STERILE BACK

## (undated) DEVICE — SYR 30CC LUER LOCK

## (undated) DEVICE — ELECTRODE REM PLYHSV RETURN 9

## (undated) DEVICE — KIT GASTROSTOMY PEG 20F

## (undated) DEVICE — PACK CYSTO

## (undated) DEVICE — GUIDE WIRE MOTION .035 X 150CM

## (undated) DEVICE — SUT 2-0 12-18IN SILK

## (undated) DEVICE — SUT SILK 3-0 SH 18IN BLACK

## (undated) DEVICE — PAD K-THERMIA 24IN X 60IN

## (undated) DEVICE — SUT SILK 2-0 SH 18IN BLACK

## (undated) DEVICE — TRAY MINOR GEN SURG

## (undated) DEVICE — SEE MEDLINE ITEM 146313

## (undated) DEVICE — STAPLER SKIN PROXIMATE WIDE

## (undated) DEVICE — WIRE GD LUB ANG 3CM .038 150CM

## (undated) DEVICE — SUT 2/0 30IN SILK BLK BRAI

## (undated) DEVICE — DRAPE ABDOMINAL TIBURON 14X11

## (undated) DEVICE — DRESSING ABSRBNT ISLAND 3.6X8